# Patient Record
Sex: MALE | Race: WHITE | NOT HISPANIC OR LATINO | Employment: UNEMPLOYED | URBAN - METROPOLITAN AREA
[De-identification: names, ages, dates, MRNs, and addresses within clinical notes are randomized per-mention and may not be internally consistent; named-entity substitution may affect disease eponyms.]

---

## 2023-04-02 ENCOUNTER — OFFICE VISIT (OUTPATIENT)
Dept: URGENT CARE | Facility: CLINIC | Age: 38
End: 2023-04-02

## 2023-04-02 VITALS
BODY MASS INDEX: 33.64 KG/M2 | RESPIRATION RATE: 15 BRPM | SYSTOLIC BLOOD PRESSURE: 130 MMHG | HEART RATE: 80 BPM | WEIGHT: 235 LBS | OXYGEN SATURATION: 97 % | TEMPERATURE: 99.2 F | DIASTOLIC BLOOD PRESSURE: 78 MMHG | HEIGHT: 70 IN

## 2023-04-02 DIAGNOSIS — B96.89 BACTERIAL CONJUNCTIVITIS OF BOTH EYES: ICD-10-CM

## 2023-04-02 DIAGNOSIS — H10.9 BACTERIAL CONJUNCTIVITIS OF BOTH EYES: ICD-10-CM

## 2023-04-02 PROCEDURE — 99203 OFFICE O/P NEW LOW 30 MIN: CPT | Performed by: NURSE PRACTITIONER

## 2023-04-02 RX ORDER — ERYTHROMYCIN 5 MG/G
OINTMENT OPHTHALMIC
Qty: 3.5 G | Refills: 0 | Status: SHIPPED | OUTPATIENT
Start: 2023-04-02 | End: 2023-09-16

## 2023-04-02 RX ORDER — POLYMYXIN B SULFATE AND TRIMETHOPRIM 1; 10000 MG/ML; [USP'U]/ML
1 SOLUTION OPHTHALMIC EVERY 4 HOURS
Qty: 10 ML | Refills: 0 | Status: SHIPPED | OUTPATIENT
Start: 2023-04-02 | End: 2023-04-02

## 2023-04-02 ASSESSMENT — VISUAL ACUITY: OU: 1

## 2023-04-02 NOTE — LETTER
April 2, 2023    To Whom It May Concern:         This is confirmation that Peter Grace attended his scheduled appointment with THONG Romero on 4/02/23. Please excuse from work today 4/2/23. May return next scheduled shift after 4/2/23.       Sincerely,          VONDA Romero.  [phone]

## 2023-04-02 NOTE — PROGRESS NOTES
Patient has consented to treatment and for use of patient information for treatment and billing purposes.    Date: 04/02/23     Arrival Mode: Private Vehicle / Ambulatory    Chief Complaint:    Chief Complaint   Patient presents with   • Conjunctivitis        History of Present Illness: 38 y.o. male  presents bilateral eye irritation redness and drainage.  Patient states several days ago he did have symptoms consistent with a viral URI some congestion mild cough and sore throat.  Patient states at this point symptoms have resolved.  He states that he woke this morning with his left eye sealed shut with mucus.  He denies wearing contacts.  Denies any fever and or body aches.  Denies any vision changes.  Denies possibility of foreign body or any eye trauma.        ROS:  As stated in HPI     Pertinent Medical History:    Past Medical History:   Diagnosis Date   • Anxiety    • Bipolar affect, depressed ([facility])         Pertinent Surgical History:    History reviewed. No pertinent surgical history.     Current  Medications:  Current Outpatient Medications on File Prior to Visit   Medication Sig Dispense Refill   • clonazepam (KLONOPIN) 1 MG TABS Take 0.5 Tabs by mouth 2 times a day. (Patient not taking: Reported on 4/2/2023) 60 Tab 0     No current facility-administered medications on file prior to visit.        Allergies:     Patient has no known allergies.     Social History:   Social History     Socioeconomic History   • Marital status: Single     Spouse name: Not on file   • Number of children: Not on file   • Years of education: Not on file   • Highest education level: Not on file   Occupational History   • Not on file   Tobacco Use   • Smoking status: Every Day   • Smokeless tobacco: Never   Substance and Sexual Activity   • Alcohol use: Yes     Comment: occ   • Drug use: Not on file   • Sexual activity: Not on file   Other Topics Concern   • Not on file   Social History Narrative   • Not on file     Social  Determinants of Health     Financial Resource Strain: Not on file   Food Insecurity: Not on file   Transportation Needs: Not on file   Physical Activity: Not on file   Stress: Not on file   Social Connections: Not on file   Intimate Partner Violence: Not on file   Housing Stability: Not on file        No LMP for male patient.       Physical Exam:  Vitals:    04/02/23 1640   BP: 130/78   Pulse: (!) 125   Resp: 15   Temp: 37.3 °C (99.2 °F)   SpO2: 97%        Physical Exam  Constitutional:       General: He is awake.      Appearance: Normal appearance. He is not ill-appearing, toxic-appearing or diaphoretic.   HENT:      Head: Normocephalic and atraumatic.      Right Ear: Tympanic membrane, ear canal and external ear normal.      Left Ear: Tympanic membrane, ear canal and external ear normal.      Nose: Nose normal.      Mouth/Throat:      Lips: Pink.      Mouth: Mucous membranes are moist.      Tongue: No lesions.      Palate: No lesions.      Pharynx: Posterior oropharyngeal erythema present. No oropharyngeal exudate or uvula swelling.      Tonsils: No tonsillar exudate or tonsillar abscesses.   Eyes:      General: Lids are normal. Lids are everted, no foreign bodies appreciated. Vision grossly intact. Gaze aligned appropriately. No allergic shiner or scleral icterus.        Right eye: No foreign body or hordeolum.         Left eye: No foreign body or hordeolum.      Extraocular Movements: Extraocular movements intact.      Conjunctiva/sclera:      Right eye: Right conjunctiva is not injected. Exudate present. No chemosis or hemorrhage.     Left eye: Left conjunctiva is injected. Exudate present. No chemosis or hemorrhage.     Pupils: Pupils are equal, round, and reactive to light.      Comments: Bilateral conjunctival injection with purulent neon green mucus.  No pain with eye movement.   Cardiovascular:      Rate and Rhythm: Normal rate and regular rhythm.      Pulses:           Radial pulses are 2+ on the right  side and 2+ on the left side.      Heart sounds: Normal heart sounds.   Pulmonary:      Effort: Pulmonary effort is normal.      Breath sounds: Normal breath sounds and air entry. No decreased breath sounds, wheezing, rhonchi or rales.   Abdominal:      General: Abdomen is flat. Bowel sounds are normal.      Palpations: Abdomen is soft.      Tenderness: There is no abdominal tenderness.   Musculoskeletal:      Right lower leg: No edema.      Left lower leg: No edema.   Lymphadenopathy:      Cervical: No cervical adenopathy.   Skin:     General: Skin is warm.      Capillary Refill: Capillary refill takes less than 2 seconds.      Coloration: Skin is not cyanotic or pale.   Neurological:      Mental Status: He is alert and oriented to person, place, and time.      Gait: Gait is intact.   Psychiatric:         Behavior: Behavior normal. Behavior is cooperative.        Diagnostics:    None     Medical Decision Making:  I personally reviewed prior external notes and test results pertinent to today's visit.   Shared decision-making was utilized with patient did develop treatment plan and clinic course. Pt is clinically stable at today's acute urgent care visit.  No acute distress noted. Appropriate for outpatient care at this time. Pleasant, 38 y.o. male presenting clinic with exam findings that are consistent with bacterial conjunctivitis.  Will treat with erythromycin at this time as pharmacies do not have Polytrim it is backordered.  Discussed meticulous hand hygiene warm compresses.  Return to clinic in the next 2 to 3 days if symptoms or not improving.  Provide a work note as requested    Did advise patient on conservative measures for management of symptoms.  Patient is agreeable to pursue adequate rest, adequate hydration, saltwater gargle and Neti pot for any symptoms of upper respiratory congestion.  Over-the-counter analgesia and antipyretics on a p.r.n. basis as needed for pain and fever.  Did discuss  over-the-counter cough medications.      Patient will monitor symptoms closely for worsening and is advised to seek further evaluation the emergency room if alarm signs or symptoms arise.  Patient states understanding and verbalizes agreement with this plan of care.    Plan:    1. Bacterial conjunctivitis of both eyes    - erythromycin 5 MG/GM Ointment; Apply pea sized amount to affected eye(s) 4 times per day, continue 48 hours past resolution of symptoms.  Dispense: 3.5 g; Refill: 0            Disposition:  Patient was discharged in stable condition.    Voice Recognition Disclaimer: Portions of this document were created using voice recognition software. The software does have a chance of producing errors of grammar and possibly content. I have made every reasonable attempt to correct obvious errors, but there may be errors of grammar and possibly content that I did not discover before finalizing the documentation.    Elsy Murdock, VONDA.

## 2023-08-02 ENCOUNTER — HOSPITAL ENCOUNTER (EMERGENCY)
Facility: MEDICAL CENTER | Age: 38
End: 2023-08-02
Attending: EMERGENCY MEDICINE
Payer: MEDICAID

## 2023-08-02 VITALS
OXYGEN SATURATION: 98 % | WEIGHT: 215 LBS | BODY MASS INDEX: 30.1 KG/M2 | DIASTOLIC BLOOD PRESSURE: 60 MMHG | RESPIRATION RATE: 18 BRPM | SYSTOLIC BLOOD PRESSURE: 111 MMHG | TEMPERATURE: 98.8 F | HEART RATE: 97 BPM | HEIGHT: 71 IN

## 2023-08-02 DIAGNOSIS — F15.10 METHAMPHETAMINE ABUSE (HCC): ICD-10-CM

## 2023-08-02 DIAGNOSIS — F31.9 BIPOLAR AFFECTIVE DISORDER, REMISSION STATUS UNSPECIFIED (HCC): ICD-10-CM

## 2023-08-02 PROCEDURE — 99284 EMERGENCY DEPT VISIT MOD MDM: CPT

## 2023-08-02 PROCEDURE — 700111 HCHG RX REV CODE 636 W/ 250 OVERRIDE (IP): Mod: JZ | Performed by: EMERGENCY MEDICINE

## 2023-08-02 PROCEDURE — 96372 THER/PROPH/DIAG INJ SC/IM: CPT

## 2023-08-02 RX ORDER — QUETIAPINE FUMARATE 100 MG/1
100 TABLET, FILM COATED ORAL 2 TIMES DAILY
Qty: 60 TABLET | Refills: 3 | Status: SHIPPED | OUTPATIENT
Start: 2023-08-02 | End: 2023-09-16

## 2023-08-02 RX ORDER — HALOPERIDOL 5 MG/ML
5 INJECTION INTRAMUSCULAR ONCE
Status: COMPLETED | OUTPATIENT
Start: 2023-08-02 | End: 2023-08-02

## 2023-08-02 RX ADMIN — HALOPERIDOL LACTATE 5 MG: 5 INJECTION, SOLUTION INTRAMUSCULAR at 04:30

## 2023-08-02 ASSESSMENT — PAIN DESCRIPTION - PAIN TYPE: TYPE: ACUTE PAIN

## 2023-08-02 NOTE — ED NOTES
Pt discharged to home. Discharge paperwork provided. Education provided by ERP.  Patient ambulatory, alert and oriented, Security escorted Pt out of ER with all belongings and steady gait.
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
## 2023-09-15 ENCOUNTER — HOSPITAL ENCOUNTER (INPATIENT)
Facility: MEDICAL CENTER | Age: 38
LOS: 20 days | DRG: 917 | End: 2023-10-06
Attending: EMERGENCY MEDICINE | Admitting: INTERNAL MEDICINE
Payer: MEDICAID

## 2023-09-15 DIAGNOSIS — R45.851 SUICIDAL IDEATION: ICD-10-CM

## 2023-09-15 DIAGNOSIS — F29 PSYCHOSIS, UNSPECIFIED PSYCHOSIS TYPE (HCC): ICD-10-CM

## 2023-09-15 DIAGNOSIS — Z91.199 NON-COMPLIANCE: ICD-10-CM

## 2023-09-15 DIAGNOSIS — F19.10 POLYSUBSTANCE ABUSE (HCC): ICD-10-CM

## 2023-09-15 DIAGNOSIS — F31.63 BIPOLAR DISORDER, CURRENT EPISODE MIXED, SEVERE, WITHOUT PSYCHOTIC FEATURES (HCC): ICD-10-CM

## 2023-09-15 DIAGNOSIS — F15.10 METHAMPHETAMINE ABUSE (HCC): ICD-10-CM

## 2023-09-15 DIAGNOSIS — T50.902A INTENTIONAL OVERDOSE, INITIAL ENCOUNTER (HCC): Primary | ICD-10-CM

## 2023-09-15 PROCEDURE — 94760 N-INVAS EAR/PLS OXIMETRY 1: CPT

## 2023-09-15 PROCEDURE — 93005 ELECTROCARDIOGRAM TRACING: CPT | Performed by: EMERGENCY MEDICINE

## 2023-09-15 PROCEDURE — 36415 COLL VENOUS BLD VENIPUNCTURE: CPT

## 2023-09-15 PROCEDURE — 82962 GLUCOSE BLOOD TEST: CPT

## 2023-09-15 PROCEDURE — 99291 CRITICAL CARE FIRST HOUR: CPT

## 2023-09-16 PROBLEM — R45.1 AGITATION: Status: ACTIVE | Noted: 2023-09-16

## 2023-09-16 PROBLEM — D72.829 LEUKOCYTOSIS: Status: ACTIVE | Noted: 2023-09-16

## 2023-09-16 PROBLEM — R74.8 ELEVATED CPK: Status: ACTIVE | Noted: 2023-09-16

## 2023-09-16 PROBLEM — T50.901A MEDICATION OVERDOSE: Status: ACTIVE | Noted: 2023-09-16

## 2023-09-16 PROBLEM — F15.10 AMPHETAMINE ABUSE (HCC): Chronic | Status: ACTIVE | Noted: 2023-09-16

## 2023-09-16 PROBLEM — T50.902A INTENTIONAL OVERDOSE (HCC): Status: ACTIVE | Noted: 2023-09-16

## 2023-09-16 LAB
ALBUMIN SERPL BCP-MCNC: 4.1 G/DL (ref 3.2–4.9)
ALBUMIN/GLOB SERPL: 1.8 G/DL
ALP SERPL-CCNC: 80 U/L (ref 30–99)
ALT SERPL-CCNC: 34 U/L (ref 2–50)
AMPHET UR QL SCN: POSITIVE
ANION GAP SERPL CALC-SCNC: 15 MMOL/L (ref 7–16)
APAP SERPL-MCNC: <5 UG/ML (ref 10–30)
AST SERPL-CCNC: 71 U/L (ref 12–45)
BARBITURATES UR QL SCN: NEGATIVE
BASOPHILS # BLD AUTO: 0.3 % (ref 0–1.8)
BASOPHILS # BLD: 0.04 K/UL (ref 0–0.12)
BENZODIAZ UR QL SCN: POSITIVE
BILIRUB SERPL-MCNC: 0.8 MG/DL (ref 0.1–1.5)
BUN SERPL-MCNC: 24 MG/DL (ref 8–22)
BZE UR QL SCN: NEGATIVE
CALCIUM ALBUM COR SERPL-MCNC: 9 MG/DL (ref 8.5–10.5)
CALCIUM SERPL-MCNC: 9.1 MG/DL (ref 8.4–10.2)
CANNABINOIDS UR QL SCN: POSITIVE
CHLORIDE SERPL-SCNC: 103 MMOL/L (ref 96–112)
CK SERPL-CCNC: 1729 U/L (ref 0–154)
CK SERPL-CCNC: 916 U/L (ref 0–154)
CO2 SERPL-SCNC: 20 MMOL/L (ref 20–33)
CREAT SERPL-MCNC: 1.25 MG/DL (ref 0.5–1.4)
EKG IMPRESSION: NORMAL
EKG IMPRESSION: NORMAL
EOSINOPHIL # BLD AUTO: 0.26 K/UL (ref 0–0.51)
EOSINOPHIL NFR BLD: 2.1 % (ref 0–6.9)
ERYTHROCYTE [DISTWIDTH] IN BLOOD BY AUTOMATED COUNT: 45.1 FL (ref 35.9–50)
ETHANOL BLD-MCNC: <10.1 MG/DL
FENTANYL UR QL: NEGATIVE
GFR SERPLBLD CREATININE-BSD FMLA CKD-EPI: 76 ML/MIN/1.73 M 2
GLOBULIN SER CALC-MCNC: 2.3 G/DL (ref 1.9–3.5)
GLUCOSE BLD STRIP.AUTO-MCNC: 107 MG/DL (ref 65–99)
GLUCOSE SERPL-MCNC: 97 MG/DL (ref 65–99)
HCT VFR BLD AUTO: 41.3 % (ref 42–52)
HGB BLD-MCNC: 13.9 G/DL (ref 14–18)
IMM GRANULOCYTES # BLD AUTO: 0.1 K/UL (ref 0–0.11)
IMM GRANULOCYTES NFR BLD AUTO: 0.8 % (ref 0–0.9)
LYMPHOCYTES # BLD AUTO: 1.73 K/UL (ref 1–4.8)
LYMPHOCYTES NFR BLD: 13.7 % (ref 22–41)
MCH RBC QN AUTO: 32.9 PG (ref 27–33)
MCHC RBC AUTO-ENTMCNC: 33.7 G/DL (ref 32.3–36.5)
MCV RBC AUTO: 97.6 FL (ref 81.4–97.8)
METHADONE UR QL SCN: NEGATIVE
MONOCYTES # BLD AUTO: 0.62 K/UL (ref 0–0.85)
MONOCYTES NFR BLD AUTO: 4.9 % (ref 0–13.4)
NEUTROPHILS # BLD AUTO: 9.91 K/UL (ref 1.82–7.42)
NEUTROPHILS NFR BLD: 78.2 % (ref 44–72)
NRBC # BLD AUTO: 0 K/UL
NRBC BLD-RTO: 0 /100 WBC (ref 0–0.2)
OPIATES UR QL SCN: NEGATIVE
OXYCODONE UR QL SCN: NEGATIVE
PCP UR QL SCN: NEGATIVE
PLATELET # BLD AUTO: 296 K/UL (ref 164–446)
PMV BLD AUTO: 8.9 FL (ref 9–12.9)
POTASSIUM SERPL-SCNC: 3.7 MMOL/L (ref 3.6–5.5)
PROPOXYPH UR QL SCN: NEGATIVE
PROT SERPL-MCNC: 6.4 G/DL (ref 6–8.2)
RBC # BLD AUTO: 4.23 M/UL (ref 4.7–6.1)
SALICYLATES SERPL-MCNC: <1 MG/DL (ref 15–25)
SODIUM SERPL-SCNC: 138 MMOL/L (ref 135–145)
WBC # BLD AUTO: 12.7 K/UL (ref 4.8–10.8)

## 2023-09-16 PROCEDURE — 700111 HCHG RX REV CODE 636 W/ 250 OVERRIDE (IP): Performed by: INTERNAL MEDICINE

## 2023-09-16 PROCEDURE — 303105 HCHG CATHETER EXTRA

## 2023-09-16 PROCEDURE — 96376 TX/PRO/DX INJ SAME DRUG ADON: CPT

## 2023-09-16 PROCEDURE — 82077 ASSAY SPEC XCP UR&BREATH IA: CPT

## 2023-09-16 PROCEDURE — 99292 CRITICAL CARE ADDL 30 MIN: CPT | Performed by: INTERNAL MEDICINE

## 2023-09-16 PROCEDURE — 51702 INSERT TEMP BLADDER CATH: CPT

## 2023-09-16 PROCEDURE — 96375 TX/PRO/DX INJ NEW DRUG ADDON: CPT

## 2023-09-16 PROCEDURE — 36415 COLL VENOUS BLD VENIPUNCTURE: CPT

## 2023-09-16 PROCEDURE — 700111 HCHG RX REV CODE 636 W/ 250 OVERRIDE (IP): Mod: JZ | Performed by: INTERNAL MEDICINE

## 2023-09-16 PROCEDURE — 99291 CRITICAL CARE FIRST HOUR: CPT | Performed by: INTERNAL MEDICINE

## 2023-09-16 PROCEDURE — 700105 HCHG RX REV CODE 258: Performed by: INTERNAL MEDICINE

## 2023-09-16 PROCEDURE — 82550 ASSAY OF CK (CPK): CPT | Mod: 91

## 2023-09-16 PROCEDURE — 96366 THER/PROPH/DIAG IV INF ADDON: CPT

## 2023-09-16 PROCEDURE — 80179 DRUG ASSAY SALICYLATE: CPT

## 2023-09-16 PROCEDURE — 96365 THER/PROPH/DIAG IV INF INIT: CPT

## 2023-09-16 PROCEDURE — 700101 HCHG RX REV CODE 250: Performed by: EMERGENCY MEDICINE

## 2023-09-16 PROCEDURE — 80053 COMPREHEN METABOLIC PANEL: CPT

## 2023-09-16 PROCEDURE — 770022 HCHG ROOM/CARE - ICU (200)

## 2023-09-16 PROCEDURE — 93005 ELECTROCARDIOGRAM TRACING: CPT | Performed by: INTERNAL MEDICINE

## 2023-09-16 PROCEDURE — 93005 ELECTROCARDIOGRAM TRACING: CPT | Performed by: EMERGENCY MEDICINE

## 2023-09-16 PROCEDURE — 700105 HCHG RX REV CODE 258: Performed by: EMERGENCY MEDICINE

## 2023-09-16 PROCEDURE — 80143 DRUG ASSAY ACETAMINOPHEN: CPT

## 2023-09-16 PROCEDURE — 700101 HCHG RX REV CODE 250: Performed by: INTERNAL MEDICINE

## 2023-09-16 PROCEDURE — 94799 UNLISTED PULMONARY SVC/PX: CPT

## 2023-09-16 PROCEDURE — 80307 DRUG TEST PRSMV CHEM ANLYZR: CPT

## 2023-09-16 PROCEDURE — 700111 HCHG RX REV CODE 636 W/ 250 OVERRIDE (IP): Performed by: EMERGENCY MEDICINE

## 2023-09-16 PROCEDURE — 94760 N-INVAS EAR/PLS OXIMETRY 1: CPT

## 2023-09-16 PROCEDURE — 85025 COMPLETE CBC W/AUTO DIFF WBC: CPT

## 2023-09-16 RX ORDER — LORAZEPAM 2 MG/ML
2 INJECTION INTRAMUSCULAR ONCE
Status: COMPLETED | OUTPATIENT
Start: 2023-09-16 | End: 2023-09-16

## 2023-09-16 RX ORDER — LORAZEPAM 2 MG/ML
1 INJECTION INTRAMUSCULAR ONCE
Status: COMPLETED | OUTPATIENT
Start: 2023-09-16 | End: 2023-09-16

## 2023-09-16 RX ORDER — SODIUM CHLORIDE 9 MG/ML
INJECTION, SOLUTION INTRAVENOUS CONTINUOUS
Status: DISCONTINUED | OUTPATIENT
Start: 2023-09-16 | End: 2023-09-16

## 2023-09-16 RX ORDER — DEXMEDETOMIDINE HYDROCHLORIDE 4 UG/ML
.1-1.5 INJECTION, SOLUTION INTRAVENOUS CONTINUOUS
Status: DISCONTINUED | OUTPATIENT
Start: 2023-09-16 | End: 2023-09-19

## 2023-09-16 RX ORDER — LABETALOL HYDROCHLORIDE 5 MG/ML
10 INJECTION, SOLUTION INTRAVENOUS EVERY 4 HOURS PRN
Status: DISCONTINUED | OUTPATIENT
Start: 2023-09-16 | End: 2023-10-06 | Stop reason: HOSPADM

## 2023-09-16 RX ORDER — SODIUM CHLORIDE 9 MG/ML
500 INJECTION, SOLUTION INTRAVENOUS
Status: CANCELLED | OUTPATIENT
Start: 2023-09-16 | End: 2023-09-16

## 2023-09-16 RX ORDER — SODIUM CHLORIDE 9 MG/ML
1000 INJECTION, SOLUTION INTRAVENOUS ONCE
Status: COMPLETED | OUTPATIENT
Start: 2023-09-16 | End: 2023-09-16

## 2023-09-16 RX ORDER — ONDANSETRON 2 MG/ML
4 INJECTION INTRAMUSCULAR; INTRAVENOUS EVERY 4 HOURS PRN
Status: DISCONTINUED | OUTPATIENT
Start: 2023-09-16 | End: 2023-10-06 | Stop reason: HOSPADM

## 2023-09-16 RX ORDER — DIPHENHYDRAMINE HYDROCHLORIDE 50 MG/ML
50 INJECTION INTRAMUSCULAR; INTRAVENOUS EVERY 6 HOURS PRN
Status: DISCONTINUED | OUTPATIENT
Start: 2023-09-16 | End: 2023-09-29

## 2023-09-16 RX ORDER — SODIUM CHLORIDE, SODIUM LACTATE, POTASSIUM CHLORIDE, CALCIUM CHLORIDE 600; 310; 30; 20 MG/100ML; MG/100ML; MG/100ML; MG/100ML
INJECTION, SOLUTION INTRAVENOUS CONTINUOUS
Status: DISCONTINUED | OUTPATIENT
Start: 2023-09-16 | End: 2023-09-19

## 2023-09-16 RX ORDER — PROCHLORPERAZINE EDISYLATE 5 MG/ML
5-10 INJECTION INTRAMUSCULAR; INTRAVENOUS EVERY 4 HOURS PRN
Status: DISCONTINUED | OUTPATIENT
Start: 2023-09-16 | End: 2023-10-06 | Stop reason: HOSPADM

## 2023-09-16 RX ORDER — ENOXAPARIN SODIUM 100 MG/ML
40 INJECTION SUBCUTANEOUS DAILY
Status: DISCONTINUED | OUTPATIENT
Start: 2023-09-16 | End: 2023-09-30

## 2023-09-16 RX ORDER — LORAZEPAM 2 MG/ML
2 INJECTION INTRAMUSCULAR EVERY 6 HOURS PRN
Status: DISCONTINUED | OUTPATIENT
Start: 2023-09-16 | End: 2023-09-20

## 2023-09-16 RX ORDER — PROMETHAZINE HYDROCHLORIDE 25 MG/1
12.5-25 TABLET ORAL EVERY 4 HOURS PRN
Status: DISCONTINUED | OUTPATIENT
Start: 2023-09-16 | End: 2023-09-29

## 2023-09-16 RX ORDER — ONDANSETRON 4 MG/1
4 TABLET, ORALLY DISINTEGRATING ORAL EVERY 4 HOURS PRN
Status: DISCONTINUED | OUTPATIENT
Start: 2023-09-16 | End: 2023-10-06 | Stop reason: HOSPADM

## 2023-09-16 RX ORDER — PROMETHAZINE HYDROCHLORIDE 25 MG/1
12.5-25 SUPPOSITORY RECTAL EVERY 4 HOURS PRN
Status: DISCONTINUED | OUTPATIENT
Start: 2023-09-16 | End: 2023-09-29

## 2023-09-16 RX ADMIN — SODIUM CHLORIDE 1000 ML: 9 INJECTION, SOLUTION INTRAVENOUS at 00:30

## 2023-09-16 RX ADMIN — DEXMEDETOMIDINE HYDROCHLORIDE 1.5 MCG/KG/HR: 400 INJECTION INTRAVENOUS at 18:19

## 2023-09-16 RX ADMIN — SODIUM CHLORIDE, POTASSIUM CHLORIDE, SODIUM LACTATE AND CALCIUM CHLORIDE: 600; 310; 30; 20 INJECTION, SOLUTION INTRAVENOUS at 21:25

## 2023-09-16 RX ADMIN — KETAMINE HYDROCHLORIDE 100 MG: 50 INJECTION INTRAMUSCULAR; INTRAVENOUS at 05:25

## 2023-09-16 RX ADMIN — DEXMEDETOMIDINE HYDROCHLORIDE 1.5 MCG/KG/HR: 400 INJECTION INTRAVENOUS at 15:31

## 2023-09-16 RX ADMIN — SODIUM CHLORIDE 1000 ML: 9 INJECTION, SOLUTION INTRAVENOUS at 01:15

## 2023-09-16 RX ADMIN — DEXMEDETOMIDINE HYDROCHLORIDE 1.5 MCG/KG/HR: 400 INJECTION INTRAVENOUS at 22:02

## 2023-09-16 RX ADMIN — LORAZEPAM 1 MG: 2 INJECTION INTRAMUSCULAR; INTRAVENOUS at 01:30

## 2023-09-16 RX ADMIN — SODIUM CHLORIDE: 9 INJECTION, SOLUTION INTRAVENOUS at 05:24

## 2023-09-16 RX ADMIN — LORAZEPAM 2 MG: 2 INJECTION INTRAMUSCULAR; INTRAVENOUS at 16:57

## 2023-09-16 RX ADMIN — DEXMEDETOMIDINE HYDROCHLORIDE 1.5 MCG/KG/HR: 400 INJECTION INTRAVENOUS at 11:50

## 2023-09-16 RX ADMIN — LORAZEPAM 2 MG: 2 INJECTION INTRAMUSCULAR; INTRAVENOUS at 00:11

## 2023-09-16 RX ADMIN — DIPHENHYDRAMINE HYDROCHLORIDE 50 MG: 50 INJECTION, SOLUTION INTRAMUSCULAR; INTRAVENOUS at 16:57

## 2023-09-16 RX ADMIN — SODIUM CHLORIDE 1000 ML: 9 INJECTION, SOLUTION INTRAVENOUS at 02:15

## 2023-09-16 RX ADMIN — ENOXAPARIN SODIUM 40 MG: 100 INJECTION SUBCUTANEOUS at 17:25

## 2023-09-16 RX ADMIN — SODIUM CHLORIDE, POTASSIUM CHLORIDE, SODIUM LACTATE AND CALCIUM CHLORIDE: 600; 310; 30; 20 INJECTION, SOLUTION INTRAVENOUS at 17:19

## 2023-09-16 RX ADMIN — DEXMEDETOMIDINE HYDROCHLORIDE 0.2 MCG/KG/HR: 400 INJECTION INTRAVENOUS at 05:22

## 2023-09-16 RX ADMIN — DIPHENHYDRAMINE HYDROCHLORIDE 50 MG: 50 INJECTION, SOLUTION INTRAMUSCULAR; INTRAVENOUS at 09:26

## 2023-09-16 RX ADMIN — SODIUM CHLORIDE, POTASSIUM CHLORIDE, SODIUM LACTATE AND CALCIUM CHLORIDE: 600; 310; 30; 20 INJECTION, SOLUTION INTRAVENOUS at 09:39

## 2023-09-16 RX ADMIN — LORAZEPAM 1 MG: 2 INJECTION INTRAMUSCULAR; INTRAVENOUS at 04:15

## 2023-09-16 ASSESSMENT — FIBROSIS 4 INDEX: FIB4 SCORE: 1.48

## 2023-09-16 ASSESSMENT — PAIN DESCRIPTION - PAIN TYPE
TYPE: ACUTE PAIN
TYPE: ACUTE PAIN

## 2023-09-16 NOTE — ED NOTES
7017 report received from Shanice  [id number] report called to Laurent. No further questions.  Pt transferred to floor, security present.  Pt into ICU bed, infusions ongoing.  All belonging taken to pt's room

## 2023-09-16 NOTE — ED TRIAGE NOTES
"Chief Complaint   Patient presents with    ALOC           Drug Overdose    Legal 2000     Patient BIB EMS with legal hold initiated by [employer]. Patient reports taking 15-20 pills of Seraquil. Patient combative in triage, arrived in four point restraints by EMS.      BP 98/55   Pulse (!) 112   Temp 36.7 °C (98.1 °F) (Temporal)   Resp (!) 53   Ht 1.803 m (5' 11\")   Wt 97.5 kg (214 lb 15.2 oz)   SpO2 97%   BMI 29.98 kg/m²     Patient incoherent, unable to answer triage questions. Per EMS patient reports taking 15-20 pills of Seroquel for SI attempt, states also tried to cut self with metal jaden. Patient placed on legal hold by [facility] PTA. Legal hold initiated at 2325  "

## 2023-09-16 NOTE — ED NOTES
2345- Patient BIB EMS on legal 200 for SI. Staff sitter initiated fr legal hold.     0045- Patient continuously fighting and loosening restraints, medicated and provided IVF on pressure bag.     0115- Patient placed in hard restraints, intermittently fighting restraints. Patient remains on monitors. In direct line of sight of sitter.     0215- Patient remains in direct line of sight of sitter, patient fighting restraints intermittently. Patient unable to participate in conversations at this time.     0315- Patient alternating between sleeping with audible snoring, and fighting restraints mumbling incoherently. Patient unable to participate in conversations at this time. Patient in direct line of sight of sitter.

## 2023-09-16 NOTE — ED NOTES
"Med rec complete per Romie. Pt unable to be interviewed.   Per Romie , Pt never picked up Seroquel from his 8/8/23 ER visit and it was returned to stock. Per Romie, Pt last picked up Doxycycline x 7 day course 6/23/23.  Allergies not verified ,left historical.  Per notel Pt had \"overdose of Seroquel \". Pt historically had a 50 mg dose as well as a 100 mg dose that was not filled. Unsure of strength of medication he took or amount.    "

## 2023-09-16 NOTE — ED NOTES
Patient resting on gurney, intermittently fighting restraints.     0630- Repositioned with 2 security and 2 RN at bedside with ERP. Patient fighting restraints and staff with readjustment.     Perez emptied, output= 1100

## 2023-09-16 NOTE — ED PROVIDER NOTES
ED Provider Note    Scribed for Geovanni Briseno by Geovanni Briseno. 9/15/2023  11:49 PM    Primary care provider: Pcp Pt States None  Means of arrival: EMS  History obtained from: Patient  History limited by: None    CHIEF COMPLAINT  Chief Complaint   Patient presents with    ALOC           Drug Overdose    Legal 2000     Patient BIB EMS with legal hold initiated by [employer]. Patient reports taking 15-20 pills of Seraquil. Patient combative in triage, arrived in four point restraints by EMS.      EXTERNAL RECORDS REVIEWED  External ED Note patient was seen at [facility] on the eighth of last month for encephalopathy, substance abuse    HPI/ROS  LIMITATION TO HISTORY   Select: Altered mental status / Confusion  OUTSIDE HISTORIAN(S):  EMS provided all collateral formation regarding patient's presentation    HPI  Peter Grace is a 38 y.o. male who presents to the Emergency Department by EMS for possible Seroquel overdose.  History is somewhat limited due to patient's altered mental status.  He reportedly was seen at [facility], was discharged there by security and put on trespassing violation.  Reportedly when he was in the ambulance bay or somewhere near the hospital, right outside the door he reportedly overdosed on Seroquel, unclear how much.  But he had a prescription bottle with him that was filled 3 days ago that was empty per report.  Police were called as the patient was reported as trespassing and when he was in the police car patient started screaming that he was suicidal and that he overdosed on Seroquel and EMS was called and the patient requested to go to a different facility other than [facility].  Unclear whether he been drinking alcohol or not.  Or use any illicit substances although he does have a history of this.  In route patient was aggressive requiring restraints and was given 5 mg IM Versed.  Upon arrival here patient is providing very little to no  history and is altered but still aggressive requiring restraints.  He does state that he wanted to kill himself he overdosed on Seroquel, otherwise no other history is provided.    The only empty bottle of medication found in the patient was his Seroquel prescription.  He supposed take 50 mg every night.  It was filled on the 11th.  So even if he took a dose on the 11th, 12, 13 and 14th the most he could have taken was around 1,250 mg.     REVIEW OF SYSTEMS  As above, all other systems reviewed and are negative.   See HPI for further details.     PAST MEDICAL HISTORY   has a past medical history of Anxiety and Bipolar affect, depressed (HCC).  SURGICAL HISTORY  patient denies any surgical history  SOCIAL HISTORY  Social History     Tobacco Use    Smoking status: Every Day    Smokeless tobacco: Never   Substance Use Topics    Alcohol use: Yes     Comment: occ      Social History     Substance and Sexual Activity   Drug Use Not on file     FAMILY HISTORY  No family history on file.  CURRENT MEDICATIONS  Home Medications    **Home medications have not yet been reviewed for this encounter**       ALLERGIES  No Known Allergies    PHYSICAL EXAM    VITAL SIGNS:   Vitals:    09/16/23 0512 09/16/23 0515 09/16/23 0517 09/16/23 0522   BP: 95/55 118/62 116/67    Pulse: 92 (!) 101 96 92   Resp: 12 (!) 21 17 14   Temp:       TempSrc:       SpO2: 98% 97% 97% 94%   Weight:       Height:         Vitals: My interpretation: normotensive, tachycardic, afebrile, not hypoxic    Reinterpretation of vitals: Improving    Cardiac Monitor Interpretation: The cardiac monitor revealed normal Sinus Rhythm with tachycardia as interpreted by me. The cardiac monitor was ordered secondary to the patient's history of tachycardia and to monitor for dysrhythmia and/or tachycardia.    PE:   Gen: Patient is delirious, signs symptoms of psychosis, restrained and agitated and aggressive   ENT: Mucous membranes moist, posterior pharynx clear, uvula midline,  nares patent bilaterally, pupils 2mm  Neck: Supple, FROM  Pulmonary: Lungs are clear to auscultation bilaterally. No tachypnea  CV:  RRR, no murmur appreciated, pulses 2+ in both upper and lower extremities  Abdomen: soft, NT/ND; no rebound/guarding  : no CVA or suprapubic tenderness   Neuro: A&Ox1 person only, speech slurred/mumbling, moving al 4 extremities and no focal deficits appreciated  Skin: No rash or lesions.  No pallor or jaundice.  No cyanosis.  Warm and dry.   Psych: Patient states he was trying to kill himself tonight by overdosing on Seroquel, has psychotic behavior, is aggressive, flailing, potential danger to himself and others, not redirectable    DIAGNOSTIC STUDIES / PROCEDURES    LABS  Results for orders placed or performed during the hospital encounter of 09/15/23   CBC WITH DIFFERENTIAL   Result Value Ref Range    WBC 12.7 (H) 4.8 - 10.8 K/uL    RBC 4.23 (L) 4.70 - 6.10 M/uL    Hemoglobin 13.9 (L) 14.0 - 18.0 g/dL    Hematocrit 41.3 (L) 42.0 - 52.0 %    MCV 97.6 81.4 - 97.8 fL    MCH 32.9 27.0 - 33.0 pg    MCHC 33.7 32.3 - 36.5 g/dL    RDW 45.1 35.9 - 50.0 fL    Platelet Count 296 164 - 446 K/uL    MPV 8.9 (L) 9.0 - 12.9 fL    Neutrophils-Polys 78.20 (H) 44.00 - 72.00 %    Lymphocytes 13.70 (L) 22.00 - 41.00 %    Monocytes 4.90 0.00 - 13.40 %    Eosinophils 2.10 0.00 - 6.90 %    Basophils 0.30 0.00 - 1.80 %    Immature Granulocytes 0.80 0.00 - 0.90 %    Nucleated RBC 0.00 0.00 - 0.20 /100 WBC    Neutrophils (Absolute) 9.91 (H) 1.82 - 7.42 K/uL    Lymphs (Absolute) 1.73 1.00 - 4.80 K/uL    Monos (Absolute) 0.62 0.00 - 0.85 K/uL    Eos (Absolute) 0.26 0.00 - 0.51 K/uL    Baso (Absolute) 0.04 0.00 - 0.12 K/uL    Immature Granulocytes (abs) 0.10 0.00 - 0.11 K/uL    NRBC (Absolute) 0.00 K/uL   COMP METABOLIC PANEL   Result Value Ref Range    Sodium 138 135 - 145 mmol/L    Potassium 3.7 3.6 - 5.5 mmol/L    Chloride 103 96 - 112 mmol/L    Co2 20 20 - 33 mmol/L    Anion Gap 15.0 7.0 - 16.0    Glucose  97 65 - 99 mg/dL    Bun 24 (H) 8 - 22 mg/dL    Creatinine 1.25 0.50 - 1.40 mg/dL    Calcium 9.1 8.4 - 10.2 mg/dL    Correct Calcium 9.0 8.5 - 10.5 mg/dL    AST(SGOT) 71 (H) 12 - 45 U/L    ALT(SGPT) 34 2 - 50 U/L    Alkaline Phosphatase 80 30 - 99 U/L    Total Bilirubin 0.8 0.1 - 1.5 mg/dL    Albumin 4.1 3.2 - 4.9 g/dL    Total Protein 6.4 6.0 - 8.2 g/dL    Globulin 2.3 1.9 - 3.5 g/dL    A-G Ratio 1.8 g/dL   Salicylate   Result Value Ref Range    Salicylates, Quant. <1.0 (L) 15.0 - 25.0 mg/dL   ACETAMINOPHEN   Result Value Ref Range    Acetaminophen -Tylenol <5.0 (L) 10.0 - 30.0 ug/mL   CREATINE KINASE   Result Value Ref Range    CPK Total 1729 (HH) 0 - 154 U/L   ETHYL ALCOHOL (BLOOD)   Result Value Ref Range    Diagnostic Alcohol <10.1 <10.1 mg/dL   ESTIMATED GFR   Result Value Ref Range    GFR (CKD-EPI) 76 >60 mL/min/1.73 m 2   EKG (NOW)   Result Value Ref Range    Report       [facility] Emergency Dept.    Test Date:  2023-09-15  Pt Name:    MARTY CHRISTIANSON             Department: [facility]  MRN:        [id number]                      Room:       [facility] 6  Gender:     Male                         Technician: [id number]  :        1985                   Requested By:FELICITY RUIZ  Order #:    [id number]                    Reading MD: Felicity Ruiz    Measurements  Intervals                                Axis  Rate:       104                          P:          55  RI:         138                          QRS:        57  QRSD:       87                           T:          41  QT:         338  QTc:        445    Interpretive Statements  Sinus tachycardia  Borderline low voltage, extremity leads  Baseline wander in lead(s) I,II,aVR,V1,V2,V3,V6  No previous ECG available for comparison  Electronically Signed On 2023 00:00:19 PDT by Felicity Ruiz     EKG (NOW)   Result Value Ref Range    Report       [facility] Emergency Dept.    Test Date:   2023  Pt Name:    MARTY CHRISTIANSON             Department: [facility]  MRN:        [id number]                      [facility]  Gender:     Male                         Technician: [id number]  :        1985                   Requested By:FELICITY RUIZ  Order #:    [id number]                    Reading MD: Felicity Ruiz    Measurements  Intervals                                Axis  Rate:       94                           P:          66  NY:         141                          QRS:        56  QRSD:       79                           T:          66  QT:         361  QTc:        452    Interpretive Statements  Sinus rhythm  Probable left atrial enlargement  Baseline wander in lead(s) V2  Compared to ECG 09/15/2023 23:52:48  Sinus tachycardia no longer present  Electronically Signed On 2023 04:35:24 PDT by Felicity Ruiz     POCT glucose device results   Result Value Ref Range    POC Glucose, Blood 107 (H) 65 - 99 mg/dL      All labs reviewed by me. Labs were compared to prior labs if they were available. Significant for mild leukocytosis, no significant anemia, normal electrolytes, normal glucose, normal renal function, normal liver enzymes other than very mild AST transaminitis, alcohol negative, CPK of 1729, salicylate and Tylenol levels are negative.  Urine drug screen shows pending at time of admission.    COURSE & MEDICAL DECISION MAKING  Nursing notes, VS, PMSFHx, labs, imaging, EKG reviewed in chart.    ED Observation Status? Yes; I am placing the patient in to an observation status due to a diagnostic uncertainty as well as therapeutic intensity. Patient placed in observation status at 11:59 PM, 9/15/2023.     Observation plan is as follows: Patient require frequent reevaluation, prolonged stay in the emergency department, and evaluation with labs before final disposition can be made for inpatient versus outpatient follow-up.    Upon Reevaluation, the patient's condition has: not  improved; and will be escalated to hospitalization.    Patient discharged from ED Observation status at 4:29 AM (Time) 9/16/2023 (Date).     Ddx: Acute psychosis, suicidal ideation, homicidal ideation, homelessness, polysubstance abuse, methamphetamine abuse    MDM: 11:49 PM Peter Grace is a 38 y.o. male who presented with acute on chronic severe psychosis, polysubstance abuse, noncompliance and altered mental status after reported intentional overdose on Seroquel with suicidal ideation.  Patient was just discharged for aggressive behavior and psychosis from [facility] and was standing outside of the hospital and reportedly overdosed on Seroquel.  Unclear how much or if any he took but he did have an empty bottle that was filled 3 days prior.  Police were called as patient was now trespassing and in the police car he started screaming that he was suicidal and that he overdosed on his Seroquel.  EMS was called patient was transported here unclear why he was not just taken back to [facility] as it was the closest facility.  In route EMS was required to put him in restraints due to concerns about patient harming himself or others and he was given 5 mg IM Versed to try to help with sedation.  Upon arrival here patient is able to provide very little information other than repeating that he is suicidal try to overdose tonight.  He is obviously altered.  EMS provides all collateral formation of substance tonight.  Upon arrival here patient transported from EMS stretcher to [facility] in safe manner and was placed in restraints as he is combative and aggressive.  Padded all railings to help protect him from harming himself.  His vital signs show that he is afebrile but tachycardic, thankfully normal tensive.  Exam shows that he is acutely altered, acting psychotic, pupils are 2 mm and reactive.  Unclear what other medications or drugs he may have taken today although it is reported that he has  a significant history of polysubstance abuse.  Multiple ED visits previously for methamphetamine related issues.  Unfortunately security to be called as patient is still thrashing in the bed requiring four-point restraints, concern for patient's safety and he was given 2 mg IV Ativan to help with sedation.  Ordered a fingerstick glucose, EKG, patient placed on the monitor and blood work sent, urine drug screen, alcohol level, salicylate and Tylenol levels, and basic blood work.  Poison control was contacted and recommend repeat EKGs as needed, continue benzodiazepines to help control agitation, and continued monitoring for at least 6 hours in the emergency department, bicarb if patient has EKG changes.  Thankfully EKG is unremarkable and shows no significant QTc or QT prolongation or other arrhythmic or ischemic changes currently.  All labs reviewed by me. Labs were compared to prior labs if they were available. Significant for mild leukocytosis, no significant anemia, normal electrolytes, normal glucose, normal renal function, normal liver enzymes other than very mild AST transaminitis, alcohol negative, CPK of 1729, salicylate Tylenol negative, urine drug screen shows pending.   Patient had hypotensive episodes in response to the multiple doses of Ativan but returned to normal tension easily with IV fluids.  However after 4 rounds of sedation here and he is still having uncontrolled agitation, I ultimately think he would benefit from a Precedex drip and ICU care this is demanding a significant amount of resources.  I discussed with the hospitalist who is willing to admit him for further evaluation and treatment.    I did a repeat EKG per poison control recommendations and QTc and EKG was unremarkable from prior.    Unfortunately despite multiple boluses of IV fluids patient still not making urine.  I did a bedside ultrasound which demonstrated likely 700 cc of retained urine retention.  Nursing bladder scan shows  greater than 760 cc of retained urine.  Likely secondary to anticholinergic effects of the overdose of Seroquel.  We will proceed with placing Perez catheter.  Patient is too agitated however and required procedural sedation with respiratory therapist at bedside using ketamine, see procedure note.    CONSCIOUS SEDATION PROCEDURE NOTE:  Patient identification was confirmed and patient consent was obtain unable to be obtained due to the emergence of the situation.  The procedure was conducted at 5:20 AM and performed by Dr. Briseno.  Anesthetic used: Ketamine 100mg  Length of Time: 18  Other medications administered: None  Pre-procedure neuro examination revealed no deficits. Patient's airway remained patent, O2SAT adequate through procedure. Vitals remained stable. Patient tolerated procedure well without complications. Post-procedure exam showed patient w/o cranial deficits. Sensory and motor intact. Patient returned to baseline prior to disposition.  Instructions for care discussed verbally and pt provided with additional written instructions for homecare and f/u.    CRITICAL CARE TIME 79 minutes: Patient tachycardic with persistent agitation, tangential nonlinear thought process, worsening acute psychosis and considered a danger to himself and others.  Concern for metabolic acidosis and electrolyte derangements requiring multiple rounds of sedation including EMS giving 5 mg IM Versed, patient given 2 mg IV Ativan here followed by another milligram IV Ativan, and yet another milligram of Ativan later in the morning with a total of 4 rounds of sedative medications.  Required frequent reevaluations.  There was a very real possibility of deterioration of the patient's condition.  This patient required the highest level of care.  I provided critical care services which included: review of the medical record, treatment orders, ordering and reviewing test results, frequent reevaluation of the patient's condition and  response to treatment, as well as discussing the case with appropriate personnel and various consultants. The critical care time associated with the care of this patient is exclusive of any procedures or specific interventions.    ADDITIONAL PROBLEM LIST AND DISPOSITION    I have discussed management of the patient with the following physicians and STEVEN's: Poison control, hospitalist    Discussion of management with other [facility] or appropriate source(s): Poison control     FINAL IMPRESSION  1. Intentional overdose, initial encounter (HCC) Acute   2. Psychosis, unspecified psychosis type (HCC) Acute   3. Polysubstance abuse (HCC) Acute   4. Suicidal ideation Acute   5. Non-compliance Acute   6. Methamphetamine abuse (HCC) Acute      The note accurately reflects work and decisions made by me.  Geovanni Briseno  9/15/2023  11:49 PM

## 2023-09-16 NOTE — ED NOTES
RN, staff and security to bedside to readjust patient positioning. Patient became combative with staff, attempting to spit at RN. Additional security and ERP to bedside for assist. Spit nagel placed on patient. Patient position readjusted in bed, restraints secured.

## 2023-09-16 NOTE — ED NOTES
Alis at poison control contacted. Recommendations: serial EKG every hour for next 4 hours, keep calm and cool with benzos. Monitor Temperature. If QRS >120 push sodium bicarb and repeat EKG. Case Number [id number]    ERP notified

## 2023-09-16 NOTE — ASSESSMENT & PLAN NOTE
- In an apparent suicide attempt.    - Maintain on suicide/homicide/elopement precaution.    - Continue one-on-one supervision.  - Patient met with  via Zoom, legal hold was continued.    -Psych is considering discontinuation of legal hold now that lithium is therapeutic.     [facility] vs [facility] placement but  Placement but beds are limited  Continue legal hold for now and continue to eval safe DC plan

## 2023-09-16 NOTE — ED NOTES
0507- time out performed, ERP, Shanice RN, Chace RN, Stephen RN, RT  0508- 25mg of Ketamine  0509- 25mg of ketamine  0510- pt still moving around and resisting  0511- 25mg of Ketamine  0513- 25mg of Ketamine  0513- Chace RN placed a 18G Right forearm  0514- Shanice Rn placed Temp qiu, urine return noted  0515-IV secured with coband

## 2023-09-16 NOTE — ED NOTES
Patient remains in direct line of sight of sitter, new sitter at bedside. Report provided to robert by RN.

## 2023-09-16 NOTE — ASSESSMENT & PLAN NOTE
Now resolved  - may have been due to agitation, but seroquel overdose may cause  - 1729 on admission, last check was 171  - pt moving/ambulating without pain

## 2023-09-16 NOTE — CONSULTS
BRIEF PSYCHIATRIC CONSULT NOTE: consult received. Called to speak to RN: pt is unable to be interviewed as he has maxed out on dex secondary to agitation. Will call tomorrow to see if he can be interviewed.

## 2023-09-16 NOTE — ED NOTES
Before pt arrived to the ED, received call for [employer]. They stated that when pt becomes medical clear or gets off his legal 2000 to call them because they are pressing charges for destructions of property.

## 2023-09-16 NOTE — ED NOTES
Patient brought in four point restraints by EMS. Patient able to loosen soft restraints, at risk for safety to oneself. ERP at bedside.

## 2023-09-16 NOTE — CONSULTS
"Critical Care Consult    Date of Admission: 9/16/23  Date of Consult: 9/16/23  Consulting: Dr. Andrea Pemberton  Reason for consult: SI/seroquel overdose    Chief Complaint:  Chief Complaint   Patient presents with    ALOC           Drug Overdose    Legal 2000     Patient BIB EMS with legal hold initiated by [employer]. Patient reports taking 15-20 pills of Seraquil. Patient combative in triage, arrived in four point restraints by EMS.      HPI:   From H&P: \"Peter Grace is a 38 y.o. male who presented 9/15/2023 with altered level of consciousness.  At this time, patient is agitated, nonverbal, information obtained from the chart.  Patient at the point time had told staff he wanted to kill himself and intentionally overdosed on Seroquel.  Judging by the amount of pills or left, it was felt the max he could have taken was 1250 mg.  He reportedly was seen at [facility], was combative and put on trespassing violation.  Apparently, this was prior to the overdose.  Because of this, police were called, patient at that time stated he had overdose and wanted to go to a different hospital.  Patient is either combative or medicated in the ER.  ERP did discuss the case with poison control who recommended serial EKGs.\"    Patient was admitted to the ICU on 9/16 d/t severe agitation requiring 4 point restraints in addition to the need for continuous cardiac monitoring.     24h events: Requiring leather restraints. Agitated. On Precedex drip. Responded well to IV Benadryl.   Tubes, Lines, Drains: PIVs  Drips: Precedex and LR   Nutrition: NPO  Notable lab trends: WBC 12.7. UDS + amphetamines, CPK 1729  Tmax: afebrile  Antibiotics: n/a  Cultures: n/a  PPX: Lovenox  BM regimen: MiraLAX, senna-docusate, milk magnesia, bisacodyl  Last BM: prior       Past Medical History:   Diagnosis Date    Amphetamine abuse (HCC) 9/16/2023    Anxiety     Bipolar affect, depressed (HCC)     Intentional overdose (HCC) 9/16/2023       No " "past surgical history on file.    Social History     Socioeconomic History    Marital status: Single     Spouse name: Not on file    Number of children: Not on file    Years of education: Not on file    Highest education level: Not on file   Occupational History    Not on file   Tobacco Use    Smoking status: Every Day    Smokeless tobacco: Never   Substance and Sexual Activity    Alcohol use: Yes     Comment: occ    Drug use: Not on file    Sexual activity: Not on file   Other Topics Concern    Not on file   Social History Narrative    Not on file     Social Determinants of Health     Financial Resource Strain: Not on file   Food Insecurity: Not on file   Transportation Needs: Not on file   Physical Activity: Not on file   Stress: Not on file   Social Connections: Not on file   Intimate Partner Violence: Not on file   Housing Stability: Not on file          No family history on file.    No current facility-administered medications on file prior to encounter.     Current Outpatient Medications on File Prior to Encounter   Medication Sig Dispense Refill    QUEtiapine Fumarate (SEROQUEL PO) Take  by mouth.         Allergies: Patient has no known allergies.      ROS: A 12 point ROS was performed on intake and during my interview. ROS negative unless specifically noted in HPI.     Vitals:  /65   Pulse 79   Temp 36.4 °C (97.6 °F) (Temporal)   Resp 20   Ht 1.803 m (5' 11\")   Wt 97.5 kg (214 lb 15.2 oz)   SpO2 97%     Physical Exam:  Physical Exam  Vitals reviewed. Exam conducted with a chaperone present.   Constitutional:       General: He is in acute distress.      Appearance: He is not ill-appearing, toxic-appearing or diaphoretic.   HENT:      Head: Normocephalic.      Nose: Nose normal.      Mouth/Throat:      Mouth: Mucous membranes are dry.   Eyes:      General:         Right eye: No discharge.         Left eye: No discharge.      Conjunctiva/sclera: Conjunctivae normal.   Cardiovascular:      Rate and " Rhythm: Normal rate and regular rhythm.      Pulses: Normal pulses.   Pulmonary:      Effort: Pulmonary effort is normal.      Breath sounds: Normal breath sounds.   Musculoskeletal:      Right lower leg: No edema.      Left lower leg: No edema.   Skin:     General: Skin is warm.      Capillary Refill: Capillary refill takes less than 2 seconds.      Coloration: Skin is not jaundiced.   Neurological:      General: No focal deficit present.      Mental Status: He is alert.   Psychiatric:         Attention and Perception: He is inattentive.         Mood and Affect: Mood is anxious and elated. Affect is labile.         Speech: Speech is rapid and pressured.         Behavior: Behavior is agitated, aggressive, hyperactive and combative.         Cognition and Memory: Cognition is impaired.         Judgment: Judgment is impulsive and inappropriate.       Laboratory Data: I personally reviewed labs including historical lab trends.       There is no immunization history on file for this patient.      Assessment/Plan:    Problem list:  #Intentional drug overdose  #Polysubstance abuse (amphetamine, cannabinoid)   #Rhabdomyolysis  #Metabolic encephalopathy 2/2 drug overdose    Plan:  -in 4 point leather restraints for his protection (pulling at lines and monitoring equipment)  -using multimodal treatment for his encephalopathy (frequent verbal redirection, PRN antihistamines, PRN benzodiazepines)  -monitoring QRS on telemetry. Pushes of bicarb PRN if QRS abnormally prolonged 2/2 seroquel overdose  -psychiatry consulted d/t manic episode, which may be drug induced (methamphetamine abuse).  -treating with IVF for rhabdomyolysis; monitor UOP; avoid nephrotoxins      The patient remains critically ill.  Critical care time = 85 minutes in directly providing and coordinating critical care and extensive data review.  No time overlap and excludes procedures.    __________  Alvin Pennington, DO  Pulmonary and Critical Care  [facility]    Please note that this dictation was created using voice recognition software. The accuracy of the dictation is limited to the abilities of the software. I have made every reasonable attempt to correct obvious errors, but I expect that there are errors of grammar and possibly content that I did not discover before finalizing the note.

## 2023-09-16 NOTE — H&P
Hospital Medicine History & Physical Note    Date of Service  9/16/2023    Primary Care Physician  Pcp Pt States None    Consultants  None    Specialist Names: None    Code Status  Full Code    Chief Complaint  Chief Complaint   Patient presents with    ALOC           Drug Overdose    Legal 2000     Patient BIB EMS with legal hold initiated by [employer]. Patient reports taking 15-20 pills of Seraquil. Patient combative in triage, arrived in four point restraints by EMS.        History of Presenting Illness  Peter Grace is a 38 y.o. male who presented 9/15/2023 with altered level of consciousness.  At this time, patient is agitated, nonverbal, information obtained from the chart.  Patient at the point time had told staff he wanted to kill himself and intentionally overdosed on Seroquel.  Judging by the amount of pills or left, it was felt the max he could have taken was 1250 mg.  He reportedly was seen at [facility], was combative and put on trespassing violation.  Apparently, this was prior to the overdose.  Because of this, police were called, patient at that time stated he had overdose and wanted to go to a different hospital.  Patient is either combative or medicated in the ER.  ERP did discuss the case with poison control who recommended serial EKGs.  I did discuss the case including labs with the ER physician.    I discussed the plan of care with  ERP .    Review of Systems  Review of Systems   Unable to perform ROS: Acuity of condition       Past Medical History   has a past medical history of Anxiety and Bipolar affect, depressed (HCC).    Surgical History   has no past surgical history on file.     Family History  family history is not on file.   Unable to obtain    Social History   reports that he has been smoking. He has never used smokeless tobacco. He reports current alcohol use.    Allergies  No Known Allergies    Medications  Prior to Admission Medications   Prescriptions Last Dose  Informant Patient Reported? Taking?   QUEtiapine (SEROQUEL) 100 MG Tab   No No   Sig: Take 1 Tablet by mouth 2 times a day.   clonazepam (KLONOPIN) 1 MG TABS   No No   Sig: Take 0.5 Tabs by mouth 2 times a day.   Patient not taking: Reported on 4/2/2023   erythromycin 5 MG/GM Ointment   No No   Sig: Apply pea sized amount to affected eye(s) 4 times per day, continue 48 hours past resolution of symptoms.      Facility-Administered Medications: None       Physical Exam  Temp:  [36.4 °C (97.6 °F)-36.7 °C (98.1 °F)] 36.4 °C (97.6 °F)  Pulse:  [] 97  Resp:  [12-53] 24  BP: ()/(35-62) 93/50  SpO2:  [93 %-99 %] 93 %  Blood Pressure: 93/50   Temperature: 36.4 °C (97.6 °F)   Pulse: 97   Respiration: (!) 24   Pulse Oximetry: 93 %       Physical Exam  Vitals and nursing note reviewed.   Constitutional:       General: He is not in acute distress.     Appearance: He is well-developed. He is not toxic-appearing or diaphoretic.   HENT:      Head: Normocephalic and atraumatic.      Right Ear: External ear normal.      Left Ear: External ear normal.      Nose: Nose normal. No congestion or rhinorrhea.      Mouth/Throat:      Mouth: Mucous membranes are dry.      Pharynx: No oropharyngeal exudate.   Eyes:      General:         Right eye: No discharge.         Left eye: No discharge.   Neck:      Trachea: No tracheal deviation.   Cardiovascular:      Rate and Rhythm: Normal rate and regular rhythm.      Heart sounds: No murmur heard.     No friction rub. No gallop.   Pulmonary:      Effort: Pulmonary effort is normal. No respiratory distress.      Breath sounds: Normal breath sounds. No stridor. No wheezing or rales.   Abdominal:      General: Bowel sounds are normal. There is no distension.      Palpations: Abdomen is soft.   Musculoskeletal:      Cervical back: Normal range of motion and neck supple. No edema or erythema.      Right lower leg: No edema.      Left lower leg: No edema.   Lymphadenopathy:      Cervical: No  "cervical adenopathy.   Skin:     General: Skin is warm and dry.      Findings: No erythema or rash.   Neurological:      Comments: Agitated    Psychiatric:         Speech: He is noncommunicative.         Laboratory:  Recent Labs     09/16/23 0011   WBC 12.7*   RBC 4.23*   HEMOGLOBIN 13.9*   HEMATOCRIT 41.3*   MCV 97.6   MCH 32.9   MCHC 33.7   RDW 45.1   PLATELETCT 296   MPV 8.9*     Recent Labs     09/16/23  0011   SODIUM 138   POTASSIUM 3.7   CHLORIDE 103   CO2 20   GLUCOSE 97   BUN 24*   CREATININE 1.25   CALCIUM 9.1     Recent Labs     09/16/23  0011   ALTSGPT 34   ASTSGOT 71*   ALKPHOSPHAT 80   TBILIRUBIN 0.8   GLUCOSE 97         No results for input(s): \"NTPROBNP\" in the last 72 hours.      No results for input(s): \"TROPONINT\" in the last 72 hours.    Imaging:  No orders to display       EKG:  I have personally reviewed the images and compared with prior images.    Assessment/Plan:  Justification for Admission Status  I anticipate this patient will require at least two midnights for appropriate medical management, necessitating inpatient admission because Seroquel overdose leading to severe agitation    Patient will need a ICU (Adult and Pediatrics) bed on CARDIOLOGY service .  The need is secondary to intentional Seroquel overdose with agitation.    * Agitation- (present on admission)  Assessment & Plan  Severe agitation post intentional Seroquel overdose  Patient be admitted to the ICU, placed on a Precedex drip  Multiple different medications have been attempted, some last for short amount of time but patient still becomes very agitated  If no improvement with Precedex drip, may require intubation but we will certainly try to avoid this    Patient is critically ill.   The patient continues to have : Severe agitation  The vital organ system that is effected is the: Neuro initially  If untreated there is a high chance of deterioration into: Permanent brain insult  The critical care that I am providing today " is: Precedex drip  The critical care that has been undertaken is medically complex.   There has been no overlap in critical care time.  Critical care time not including procedures, no overlap: 36 minutes    Medication overdose- (present on admission)  Assessment & Plan  Patient intentionally took excessive amounts of Seroquel  Patient is on legal hold  ERP did discuss the case with poison control who recommended serial EKGs which have been ordered  Monitor patient on telemetry    Elevated CPK- (present on admission)  Assessment & Plan  CPK level is less than 2000, with his severe agitation however it does risk increasing  Start IV fluids at a elevated rate  Repeat CPK later this morning  Normal kidney function    Leukocytosis- (present on admission)  Assessment & Plan  Likely reactive  Monitor for additional signs of infection        VTE prophylaxis: SCDs/TEDs and enoxaparin ppx

## 2023-09-16 NOTE — ED NOTES
Emy from Lab called with critical result of CPK at 1729. Critical lab result read back to Emy.   Dr. Briseno notified of critical lab result at 0052.  Critical lab result read back by Dr. Briseno

## 2023-09-17 LAB
ANION GAP SERPL CALC-SCNC: 12 MMOL/L (ref 7–16)
BUN SERPL-MCNC: 17 MG/DL (ref 8–22)
CALCIUM SERPL-MCNC: 8.1 MG/DL (ref 8.4–10.2)
CHLORIDE SERPL-SCNC: 110 MMOL/L (ref 96–112)
CO2 SERPL-SCNC: 17 MMOL/L (ref 20–33)
CREAT SERPL-MCNC: 1 MG/DL (ref 0.5–1.4)
EKG IMPRESSION: NORMAL
ERYTHROCYTE [DISTWIDTH] IN BLOOD BY AUTOMATED COUNT: 45 FL (ref 35.9–50)
GFR SERPLBLD CREATININE-BSD FMLA CKD-EPI: 100 ML/MIN/1.73 M 2
GLUCOSE SERPL-MCNC: 99 MG/DL (ref 65–99)
HCT VFR BLD AUTO: 39.9 % (ref 42–52)
HGB BLD-MCNC: 13.3 G/DL (ref 14–18)
MCH RBC QN AUTO: 32.9 PG (ref 27–33)
MCHC RBC AUTO-ENTMCNC: 33.3 G/DL (ref 32.3–36.5)
MCV RBC AUTO: 98.8 FL (ref 81.4–97.8)
PLATELET # BLD AUTO: 256 K/UL (ref 164–446)
PMV BLD AUTO: 9.1 FL (ref 9–12.9)
POTASSIUM SERPL-SCNC: 4.4 MMOL/L (ref 3.6–5.5)
RBC # BLD AUTO: 4.04 M/UL (ref 4.7–6.1)
SODIUM SERPL-SCNC: 139 MMOL/L (ref 135–145)
WBC # BLD AUTO: 7.3 K/UL (ref 4.8–10.8)

## 2023-09-17 PROCEDURE — 94760 N-INVAS EAR/PLS OXIMETRY 1: CPT

## 2023-09-17 PROCEDURE — 93010 ELECTROCARDIOGRAM REPORT: CPT | Mod: 76 | Performed by: INTERNAL MEDICINE

## 2023-09-17 PROCEDURE — 80048 BASIC METABOLIC PNL TOTAL CA: CPT

## 2023-09-17 PROCEDURE — 99291 CRITICAL CARE FIRST HOUR: CPT | Performed by: INTERNAL MEDICINE

## 2023-09-17 PROCEDURE — 700105 HCHG RX REV CODE 258: Performed by: INTERNAL MEDICINE

## 2023-09-17 PROCEDURE — 700111 HCHG RX REV CODE 636 W/ 250 OVERRIDE (IP): Mod: JZ | Performed by: INTERNAL MEDICINE

## 2023-09-17 PROCEDURE — 99292 CRITICAL CARE ADDL 30 MIN: CPT | Performed by: INTERNAL MEDICINE

## 2023-09-17 PROCEDURE — 93005 ELECTROCARDIOGRAM TRACING: CPT | Performed by: INTERNAL MEDICINE

## 2023-09-17 PROCEDURE — 700101 HCHG RX REV CODE 250: Performed by: INTERNAL MEDICINE

## 2023-09-17 PROCEDURE — 85027 COMPLETE CBC AUTOMATED: CPT

## 2023-09-17 PROCEDURE — 770022 HCHG ROOM/CARE - ICU (200)

## 2023-09-17 PROCEDURE — 93010 ELECTROCARDIOGRAM REPORT: CPT | Performed by: INTERNAL MEDICINE

## 2023-09-17 RX ORDER — HALOPERIDOL 5 MG/ML
3 INJECTION INTRAMUSCULAR 3 TIMES DAILY
Status: DISCONTINUED | OUTPATIENT
Start: 2023-09-17 | End: 2023-09-18

## 2023-09-17 RX ADMIN — DEXMEDETOMIDINE HYDROCHLORIDE 1.5 MCG/KG/HR: 400 INJECTION INTRAVENOUS at 04:13

## 2023-09-17 RX ADMIN — DEXMEDETOMIDINE HYDROCHLORIDE 1.5 MCG/KG/HR: 400 INJECTION INTRAVENOUS at 07:34

## 2023-09-17 RX ADMIN — SODIUM CHLORIDE, POTASSIUM CHLORIDE, SODIUM LACTATE AND CALCIUM CHLORIDE: 600; 310; 30; 20 INJECTION, SOLUTION INTRAVENOUS at 09:33

## 2023-09-17 RX ADMIN — DEXMEDETOMIDINE HYDROCHLORIDE 1.5 MCG/KG/HR: 400 INJECTION INTRAVENOUS at 17:27

## 2023-09-17 RX ADMIN — ENOXAPARIN SODIUM 40 MG: 100 INJECTION SUBCUTANEOUS at 18:04

## 2023-09-17 RX ADMIN — DIPHENHYDRAMINE HYDROCHLORIDE 50 MG: 50 INJECTION, SOLUTION INTRAMUSCULAR; INTRAVENOUS at 04:32

## 2023-09-17 RX ADMIN — SODIUM CHLORIDE, POTASSIUM CHLORIDE, SODIUM LACTATE AND CALCIUM CHLORIDE: 600; 310; 30; 20 INJECTION, SOLUTION INTRAVENOUS at 22:09

## 2023-09-17 RX ADMIN — DIPHENHYDRAMINE HYDROCHLORIDE 50 MG: 50 INJECTION, SOLUTION INTRAMUSCULAR; INTRAVENOUS at 18:31

## 2023-09-17 RX ADMIN — DEXMEDETOMIDINE HYDROCHLORIDE 1.5 MCG/KG/HR: 400 INJECTION INTRAVENOUS at 00:58

## 2023-09-17 RX ADMIN — HALOPERIDOL LACTATE 3 MG: 5 INJECTION, SOLUTION INTRAMUSCULAR at 09:31

## 2023-09-17 RX ADMIN — DIPHENHYDRAMINE HYDROCHLORIDE 50 MG: 50 INJECTION, SOLUTION INTRAMUSCULAR; INTRAVENOUS at 11:57

## 2023-09-17 RX ADMIN — DEXMEDETOMIDINE HYDROCHLORIDE 1.5 MCG/KG/HR: 400 INJECTION INTRAVENOUS at 20:56

## 2023-09-17 RX ADMIN — SODIUM CHLORIDE, POTASSIUM CHLORIDE, SODIUM LACTATE AND CALCIUM CHLORIDE: 600; 310; 30; 20 INJECTION, SOLUTION INTRAVENOUS at 17:52

## 2023-09-17 RX ADMIN — SODIUM CHLORIDE, POTASSIUM CHLORIDE, SODIUM LACTATE AND CALCIUM CHLORIDE: 600; 310; 30; 20 INJECTION, SOLUTION INTRAVENOUS at 01:25

## 2023-09-17 RX ADMIN — HALOPERIDOL LACTATE 3 MG: 5 INJECTION, SOLUTION INTRAMUSCULAR at 20:11

## 2023-09-17 RX ADMIN — LORAZEPAM 2 MG: 2 INJECTION INTRAMUSCULAR; INTRAVENOUS at 13:34

## 2023-09-17 RX ADMIN — SODIUM CHLORIDE, POTASSIUM CHLORIDE, SODIUM LACTATE AND CALCIUM CHLORIDE: 600; 310; 30; 20 INJECTION, SOLUTION INTRAVENOUS at 05:32

## 2023-09-17 RX ADMIN — DEXMEDETOMIDINE HYDROCHLORIDE 1.5 MCG/KG/HR: 400 INJECTION INTRAVENOUS at 10:52

## 2023-09-17 RX ADMIN — LORAZEPAM 2 MG: 2 INJECTION INTRAMUSCULAR; INTRAVENOUS at 04:31

## 2023-09-17 RX ADMIN — HALOPERIDOL LACTATE 3 MG: 5 INJECTION, SOLUTION INTRAMUSCULAR at 15:22

## 2023-09-17 RX ADMIN — SODIUM CHLORIDE, POTASSIUM CHLORIDE, SODIUM LACTATE AND CALCIUM CHLORIDE: 600; 310; 30; 20 INJECTION, SOLUTION INTRAVENOUS at 13:38

## 2023-09-17 RX ADMIN — DEXMEDETOMIDINE HYDROCHLORIDE 1.5 MCG/KG/HR: 400 INJECTION INTRAVENOUS at 13:59

## 2023-09-17 ASSESSMENT — PAIN DESCRIPTION - PAIN TYPE
TYPE: ACUTE PAIN

## 2023-09-17 NOTE — PROGRESS NOTES
We have been unable to perform the serial EKG's due to patient agitation and aggression. Primary team is aware, will attempt when safe to do so.

## 2023-09-17 NOTE — PROGRESS NOTES
12-hour chart check complete.    Monitor Summary  Rhythm: SB / SR  Rate: 53-83  Ectopy: rPVC  Measurements: 0.12/0.08/0.44

## 2023-09-17 NOTE — PROGRESS NOTES
12-hour chart check complete.    Monitor Summary  Rhythm: SB/SR   Rate: 42-61, down to 38  Ectopy: rPVC, rPAC  Measurements: 0.12/0.08/0.42

## 2023-09-17 NOTE — PROGRESS NOTES
"Patient extremely hostile and combative yelling and using profanity at all staff, and verbally threatening harm to staff. Patient yelled :\"I just found my sister's head in a box with lyme and if I get out of this you're going to be that way too! You whore! Get out of here! Get the fuck out of here! Let me go! Let me go! Let me goooooooo! Let me the fuck go!\"  "

## 2023-09-17 NOTE — CARE PLAN
The patient is Watcher - Medium risk of patient condition declining or worsening    Shift Goals  Clinical Goals: Decreased agitation  Patient Goals: RUPERT  Family Goals: RUPERT    Patient remains in 4 point leather restraints and maxed on Precedex. Continues to be agitated and verbally abusive towards staff.     Progress made toward(s) clinical / shift goals:    Problem: Pain - Standard  Goal: Alleviation of pain or a reduction in pain to the patient’s comfort goal  Outcome: Progressing       Patient is not progressing towards the following goals:      Problem: Safety - Violent/Self-destructive Restraint  Goal: Free from restraints (Restraint for Violent or Self-Destructive Behavior)  Outcome: Not Progressing     Problem: Knowledge Deficit - Standard  Goal: Patient and family/care givers will demonstrate understanding of plan of care, disease process/condition, diagnostic tests and medications  Outcome: Not Progressing

## 2023-09-17 NOTE — PROGRESS NOTES
"Critical Care Progress Note    Date of Admission: 9/16/23  Date of Consult: 9/16/23  Consulting: Dr. Andrea Pemberton  Reason for consult: SI/seroquel overdose    Chief Complaint:  Chief Complaint   Patient presents with    ALOC           Drug Overdose    Legal 2000     Patient BIB EMS with legal hold initiated by [employer]. Patient reports taking 15-20 pills of Seraquil. Patient combative in triage, arrived in four point restraints by EMS.      HPI:   From H&P: \"Peter Grace is a 38 y.o. male who presented 9/15/2023 with altered level of consciousness.  At this time, patient is agitated, nonverbal, information obtained from the chart.  Patient at the point time had told staff he wanted to kill himself and intentionally overdosed on Seroquel.  Judging by the amount of pills or left, it was felt the max he could have taken was 1250 mg.  He reportedly was seen at [facility], was combative and put on trespassing violation.  Apparently, this was prior to the overdose.  Because of this, police were called, patient at that time stated he had overdose and wanted to go to a different hospital.  Patient is either combative or medicated in the ER.  ERP did discuss the case with poison control who recommended serial EKGs.\"    Patient was admitted to the ICU on 9/16 d/t severe agitation requiring 4 point restraints in addition to the need for continuous cardiac monitoring.     9/16: Requiring 4 point restraints, frequent redirecting, IV Ativan and IV Benadryl in addition to Precedex drip.     24h events: Bradycardia overnight. Very agitated overnight. Getting PRN Ativan and Benadryl.   Tubes, Lines, Drains: PIVs  Drips: Precedex and LR   Nutrition: NPO  Notable lab trends: WBC 12.7. UDS + amphetamines, CPK 1729  Tmax: afebrile  Antibiotics: n/a  Cultures: n/a  PPX: Lovenox  BM regimen: MiraLAX, senna-docusate, milk magnesia, bisacodyl  Last BM: prior       Scheduled Medications   Medication Dose Frequency    " "enoxaparin (LOVENOX) injection  40 mg DAILY AT 1800       Allergies: Patient has no known allergies.      ROS: A 12 point ROS was performed on intake and during my interview. ROS negative unless specifically noted in HPI.     Vitals:  /71   Pulse (!) 59   Temp 36.3 °C (97.3 °F) (Bladder)   Resp 14   Ht 1.803 m (5' 11\")   Wt 97.5 kg (214 lb 15.2 oz)   SpO2 98%     Physical Exam:  Physical Exam  Vitals reviewed. Exam conducted with a chaperone present.   Constitutional:       General: He is in acute distress.      Appearance: He is not ill-appearing, toxic-appearing or diaphoretic.   HENT:      Head: Normocephalic.      Nose: Nose normal.      Mouth/Throat:      Mouth: Mucous membranes are dry.   Eyes:      General:         Right eye: No discharge.         Left eye: No discharge.      Conjunctiva/sclera: Conjunctivae normal.   Cardiovascular:      Rate and Rhythm: Normal rate and regular rhythm.      Pulses: Normal pulses.   Pulmonary:      Effort: Pulmonary effort is normal.      Breath sounds: Normal breath sounds.   Musculoskeletal:      Right lower leg: No edema.      Left lower leg: No edema.   Skin:     General: Skin is warm.      Capillary Refill: Capillary refill takes less than 2 seconds.      Coloration: Skin is not jaundiced.   Neurological:      General: No focal deficit present.      Mental Status: He is alert.   Psychiatric:         Attention and Perception: He is inattentive.         Mood and Affect: Mood is anxious and elated. Affect is labile.         Behavior: Behavior is agitated, aggressive, hyperactive and combative.         Cognition and Memory: Cognition is impaired.         Judgment: Judgment is impulsive and inappropriate.       Laboratory Data: I personally reviewed labs including historical lab trends.       There is no immunization history on file for this patient.      Assessment/Plan:    Problem list:  #Intentional drug overdose  #Polysubstance abuse (amphetamine, cannabinoid) "   #Rhabdomyolysis  #Metabolic encephalopathy 2/2 drug overdose    Plan:  -in 4 point leather restraints for his protection (pulling at lines and monitoring equipment)  -using multimodal treatment for his encephalopathy (frequent verbal redirection, PRN antihistamines, PRN benzodiazepines)  -monitoring QRS on telemetry. Pushes of bicarb PRN if QRS abnormally prolonged 2/2 seroquel overdose  -the half life of seroquel is 6 hours; anticipate that he will be cleared of the drug this afternoon (30 hours total).   -psychiatry consulted d/t manic episode, which may be drug induced (methamphetamine abuse). Appreciate their support. Adding Haldol 3 mg IV tid today.   -treating with IVF for rhabdomyolysis; monitor UOP; avoid nephrotoxins      The patient remains critically ill.  Critical care time = 65 minutes in directly providing and coordinating critical care and extensive data review.  No time overlap and excludes procedures.    __________  Alvin Pennington, DO  Pulmonary and Critical Care Medicine  [facility]    Please note that this dictation was created using voice recognition software. The accuracy of the dictation is limited to the abilities of the software. I have made every reasonable attempt to correct obvious errors, but I expect that there are errors of grammar and possibly content that I did not discover before finalizing the note.

## 2023-09-17 NOTE — PROGRESS NOTES
"Patient agitation and aggression increased. Therapeutic communication failed. He proceeded to bite off his pulse ox probe while saying \" I want to kill all of you here.\" Patient trying to kick, roll over to the side and bite staff members; when unsuccessful with attempt, he tried taking his qiu out, when it met resistance, he pulled the qiu temperature probe out completely. Patient was educated on the importance of medical equipment with no evidence of learning.   "

## 2023-09-17 NOTE — PROGRESS NOTES
Patient SB in the high 40s the beginning of the night. Patient dropped to low 40s and continuously dipped to 38. Patient asymptomatic. Dr. Pemberton notified; ok with HR in high 30s. Will continue to monitor.

## 2023-09-17 NOTE — PROGRESS NOTES
12-hour chart check complete.    Monitor Summary  Rhythm: SR/SB  Rate: 32-98  Ectopy: rPVC, rPAC  Measurements: 0.16/0.08/0.36

## 2023-09-17 NOTE — CARE PLAN
Problem: Safety - Violent/Self-destructive Restraint  Goal: Remains free of injury from restraints (Restraint for Violent/Self-Destructive Behavior)  Outcome: Progressing  Goal: Free from restraints (Restraint for Violent or Self-Destructive Behavior)  Outcome: Progressing     Problem: Knowledge Deficit - Standard  Goal: Patient and family/care givers will demonstrate understanding of plan of care, disease process/condition, diagnostic tests and medications  Outcome: Progressing     Problem: Skin Integrity  Goal: Skin integrity is maintained or improved  Outcome: Progressing     Problem: Fall Risk  Goal: Patient will remain free from falls  Outcome: Progressing     Problem: Safety - Medical Restraint  Goal: Remains free of injury from restraints (Restraint for Interference with Medical Device)  Outcome: Progressing  Goal: Free from restraint(s) (Restraint for Interference with Medical Device)  Outcome: Progressing   The patient is Watcher - Medium risk of patient condition declining or worsening    Shift Goals  Clinical Goals: Rest, decreased agitation

## 2023-09-17 NOTE — CONSULTS
[facility] PSYCHIATRIC CONSULT:Intake  Reason for admission: reported taking 15-20 pills of seroquel. Was combative. He had a prescription bottle with him that was filled 3 days ago that was empty per report.  Police were called as the patient was reported as trespassing and when he was in the police car patient started screaming that he was suicidal and that he overdosed on Seroquel and EMS was called and the patient requested to go to a different facility other than [facility]. The most he could have taken was around 1,250 mg.    Consulting Physician/APN/PA: Alvin Pennington D.O.  Reason for Consult: SA via OD, psychosis, aggression, +amphetamines  Consultant: Katy Falcon MD    Legal Status  on hold      CC: none  HPI: 37 yo male who is unable to provide a meaningful hx and keeps his eyes closed throughout. His chest has glitter on it. When asked orientation questions he suddenly said he was born in [facility] and seemed to mention [facility]. Per staff he has also said he was born in 1926.        Chart(s) Review:  2014: anxiety, recent move from [place]. No indication of PSA. Given klonopin 0.5 mg bid  2014 multiple visits: anxiety, bipolar disorder: no information   5/2023 drinks alcohol 3-4 times a week and drank alcohol this morning. States that 3 years ago he was on lithium in the need to prove that he does not have lithium in his system.... He does have a history of bipolar disorder but he says it is well controlled with marijuana. Patient does not take any other medications. May have been homeless.  6/2023: history anxiety, presents with a panic attack/anxiety today... was on long-term medications for a while such as hydroxyzine. Marijuana, Methamphetamines . Given seroquel 50 mg hs.  He says he has had diagnosis in the past of schizoaffective as well   8/2023: came in for contusion of buttock. Given dx of stimulant use disorder. psychomotor agitation.   8/2023: brought to the ED  "by [employer] today for homicidal ideation having made threats to roommates.  Reports that \"someone hacked into my phone about 6 months ago and has been making fun of me\". Roommates very fearful and indicated an acute change in behavior. Pt attempted to use drugs that was brought w/ his belongings. Pt became aggressive when asked to stop and was escorted out by security. Returned.Verbally aggressive with staff.... urinated on the floor multiple times... noncompliant with medications    Medical ROS:  Review of systems unable to participate    Psychiatric Exam (MSE):  Vitals:Blood pressure 127/78, pulse (!) 58, temperature 35.9 °C (96.6 °F), temperature source Bladder, resp. rate 18, height 1.803 m (5' 11\"), weight 97.5 kg (214 lb 15.2 oz), SpO2 97 %.    Constitutional: normal habitus, glitter on chest, 4 pt leather restraints, cannot cooperate meaningfully.  General Appearance: as noted  Musculoskeletal: as noted above  Alert/Orientation: x2 briefly as noted. Alertness is diminished  Attn/Concentration: poor  Fund of Knowledge:unable to assess  Memory recent/remote: unable to assess  Speech: mumbles most of the time  Language: fluent when he said he was born in [place], otherwise mumbles and is intelligible  Thought Content:+ psychosis per notes. Unable to assess   SI/HI .   Thought Process: unable to assess  Insight/Judgement:impaired  Mood: not identified  Affect: at times seemed to smile a bit.     Past Medical Hx:     Past Medical History:   Diagnosis Date    Amphetamine abuse (HCC) 9/16/2023    Anxiety     Bipolar affect, depressed (HCC)     Intentional overdose (HCC) 9/16/2023       Past Psychiatric Hx:  SI/SAs:  Hospitalizations:   Dx:  Medication Trials  Violence/HI:     Family Psych Hx:  No family history on file.    Social Hx:  Housing:  Financial:  Support:  Education:  Abuse:  Drugs/Alcohol:     Labs:  Lab Results   Component Value Date/Time    AMPHUR Positive (A) 09/16/2023 0525    BARBSURINE Negative 09/16/2023 " 0525    BENZODIAZU Positive (A) 09/16/2023 0525    COCAINEMET Negative 09/16/2023 0525    METHADONE Negative 09/16/2023 0525    OPIATES Negative 09/16/2023 0525    OXYCODN Negative 09/16/2023 0525    PCPURINE Negative 09/16/2023 0525    PROPOXY Negative 09/16/2023 0525    CANNABINOID Positive (A) 09/16/2023 0525     Recent Labs     09/16/23  0011 09/17/23  0250   WBC 12.7* 7.3   RBC 4.23* 4.04*   HEMOGLOBIN 13.9* 13.3*   HEMATOCRIT 41.3* 39.9*   MCV 97.6 98.8*   MCH 32.9 32.9   RDW 45.1 45.0   PLATELETCT 296 256   MPV 8.9* 9.1   NEUTSPOLYS 78.20*  --    LYMPHOCYTES 13.70*  --    MONOCYTES 4.90  --    EOSINOPHILS 2.10  --    BASOPHILS 0.30  --      Recent Labs     09/16/23  0011 09/16/23  1445 09/17/23  0250   SODIUM 138  --  139   POTASSIUM 3.7  --  4.4   CHLORIDE 103  --  110   CO2 20  --  17*   GLUCOSE 97  --  99   BUN 24*  --  17   CPKTOTAL 1729* 916*  --        Cranial Imaging: none      EKG: QTc:  439       Meds Current:  Scheduled Medications   Medication Dose Frequency    enoxaparin (LOVENOX) injection  40 mg DAILY AT 1800     Allergies: Patient has no known allergies.      Assessement    1.Methamphetamine use disorder  2 Psychotic disorder unspc     -R/O secondary to substance use  3  hx of bipolar disorder, schizoaffective disorder but not documented supporting documentation of symptoms so not clear whether he has a primary psychiatric disorder or not.  4  THC use  5   Encephalopathy, likely substance induced.    Medical:    -none acutely      Recommendations:  Legal Status:  on hold. Continue to reassess    Observation status:   -line of site with sitter   Visitors:  no  Personal belongings:no    Discussed/voalted: RASHID Pennington DO    Medication and Other Recommendations: final orders as per Tx Tm   Per the prescription bottle, given intake calculation of potential mg over dosed on, he is on 50 mg hs. Per pharmacy he is on seroquel no mg listed and per another note he is on seroquel 100 mg bid, klonopin 0.5  mg bid . Other than seroquel being the common factor, it is unclear what he takes, the dose he is supposed to take, etc.   2   klonopin is long acting, WD sz, if he has been taking any, is low.   3   recommend haldol IV 3 mg tid independently of whether he is encephalopathic or if this is drug induced.   4   if any signs of EPS, use benadryl 50 mg IV tid. It can also be added to haldol if pt is very agitated.     Will continue to follow with you.    Thank you for the consult.        Discharge recommendations: TBD     If released from [facility]: Discharge Instructions:  -Reviewed safety plan: 911, ER, PCM, MHC, Suicide crisis line  -Please assist with outpatient Psychiatric/substance use follow up appointments at discharge once medically cleared.

## 2023-09-18 LAB
ALBUMIN SERPL BCP-MCNC: 2.5 G/DL (ref 3.2–4.9)
ALBUMIN/GLOB SERPL: 1.2 G/DL
ALP SERPL-CCNC: 61 U/L (ref 30–99)
ALT SERPL-CCNC: 19 U/L (ref 2–50)
ANION GAP SERPL CALC-SCNC: 11 MMOL/L (ref 7–16)
AST SERPL-CCNC: 21 U/L (ref 12–45)
BILIRUB SERPL-MCNC: <0.2 MG/DL (ref 0.1–1.5)
BUN SERPL-MCNC: 11 MG/DL (ref 8–22)
CALCIUM ALBUM COR SERPL-MCNC: 9.3 MG/DL (ref 8.5–10.5)
CALCIUM SERPL-MCNC: 8.1 MG/DL (ref 8.4–10.2)
CHLORIDE SERPL-SCNC: 107 MMOL/L (ref 96–112)
CK SERPL-CCNC: 171 U/L (ref 0–154)
CO2 SERPL-SCNC: 21 MMOL/L (ref 20–33)
CREAT SERPL-MCNC: 1.02 MG/DL (ref 0.5–1.4)
EKG IMPRESSION: NORMAL
GFR SERPLBLD CREATININE-BSD FMLA CKD-EPI: 97 ML/MIN/1.73 M 2
GLOBULIN SER CALC-MCNC: 2.1 G/DL (ref 1.9–3.5)
GLUCOSE SERPL-MCNC: 106 MG/DL (ref 65–99)
LITHIUM SERPL-MCNC: 0.1 MMOL/L (ref 0.6–1.2)
POTASSIUM SERPL-SCNC: 3.7 MMOL/L (ref 3.6–5.5)
PROT SERPL-MCNC: 4.6 G/DL (ref 6–8.2)
SODIUM SERPL-SCNC: 139 MMOL/L (ref 135–145)

## 2023-09-18 PROCEDURE — 99291 CRITICAL CARE FIRST HOUR: CPT | Performed by: INTERNAL MEDICINE

## 2023-09-18 PROCEDURE — 82550 ASSAY OF CK (CPK): CPT

## 2023-09-18 PROCEDURE — 94760 N-INVAS EAR/PLS OXIMETRY 1: CPT

## 2023-09-18 PROCEDURE — 80178 ASSAY OF LITHIUM: CPT

## 2023-09-18 PROCEDURE — 700105 HCHG RX REV CODE 258: Performed by: INTERNAL MEDICINE

## 2023-09-18 PROCEDURE — 770022 HCHG ROOM/CARE - ICU (200)

## 2023-09-18 PROCEDURE — 700102 HCHG RX REV CODE 250 W/ 637 OVERRIDE(OP): Performed by: STUDENT IN AN ORGANIZED HEALTH CARE EDUCATION/TRAINING PROGRAM

## 2023-09-18 PROCEDURE — A9270 NON-COVERED ITEM OR SERVICE: HCPCS | Performed by: STUDENT IN AN ORGANIZED HEALTH CARE EDUCATION/TRAINING PROGRAM

## 2023-09-18 PROCEDURE — 700101 HCHG RX REV CODE 250: Performed by: INTERNAL MEDICINE

## 2023-09-18 PROCEDURE — 700111 HCHG RX REV CODE 636 W/ 250 OVERRIDE (IP): Mod: JZ | Performed by: INTERNAL MEDICINE

## 2023-09-18 PROCEDURE — 93010 ELECTROCARDIOGRAM REPORT: CPT | Performed by: INTERNAL MEDICINE

## 2023-09-18 PROCEDURE — 80053 COMPREHEN METABOLIC PANEL: CPT

## 2023-09-18 PROCEDURE — 99292 CRITICAL CARE ADDL 30 MIN: CPT | Performed by: INTERNAL MEDICINE

## 2023-09-18 RX ORDER — HALOPERIDOL 1 MG/1
3 TABLET ORAL DAILY
Status: DISCONTINUED | OUTPATIENT
Start: 2023-09-18 | End: 2023-09-18

## 2023-09-18 RX ORDER — NICOTINE 21 MG/24HR
1 PATCH, TRANSDERMAL 24 HOURS TRANSDERMAL
Status: DISCONTINUED | OUTPATIENT
Start: 2023-09-18 | End: 2023-10-06 | Stop reason: HOSPADM

## 2023-09-18 RX ORDER — HALOPERIDOL 1 MG/1
3 TABLET ORAL DAILY
Status: DISCONTINUED | OUTPATIENT
Start: 2023-09-19 | End: 2023-09-19

## 2023-09-18 RX ORDER — HALOPERIDOL 1 MG/1
3 TABLET ORAL
Status: COMPLETED | OUTPATIENT
Start: 2023-09-18 | End: 2023-09-18

## 2023-09-18 RX ADMIN — ENOXAPARIN SODIUM 40 MG: 100 INJECTION SUBCUTANEOUS at 18:08

## 2023-09-18 RX ADMIN — DEXMEDETOMIDINE HYDROCHLORIDE 1.5 MCG/KG/HR: 400 INJECTION INTRAVENOUS at 18:07

## 2023-09-18 RX ADMIN — DEXMEDETOMIDINE HYDROCHLORIDE 1.5 MCG/KG/HR: 400 INJECTION INTRAVENOUS at 10:55

## 2023-09-18 RX ADMIN — DIPHENHYDRAMINE HYDROCHLORIDE 50 MG: 50 INJECTION, SOLUTION INTRAMUSCULAR; INTRAVENOUS at 19:25

## 2023-09-18 RX ADMIN — DEXMEDETOMIDINE HYDROCHLORIDE 1.5 MCG/KG/HR: 400 INJECTION INTRAVENOUS at 00:39

## 2023-09-18 RX ADMIN — DEXMEDETOMIDINE HYDROCHLORIDE 1.5 MCG/KG/HR: 400 INJECTION INTRAVENOUS at 07:24

## 2023-09-18 RX ADMIN — SODIUM CHLORIDE, POTASSIUM CHLORIDE, SODIUM LACTATE AND CALCIUM CHLORIDE: 600; 310; 30; 20 INJECTION, SOLUTION INTRAVENOUS at 10:53

## 2023-09-18 RX ADMIN — HALOPERIDOL 3 MG: 1 TABLET ORAL at 20:22

## 2023-09-18 RX ADMIN — NICOTINE 14 MG: 14 PATCH TRANSDERMAL at 14:02

## 2023-09-18 RX ADMIN — DEXMEDETOMIDINE HYDROCHLORIDE 1.5 MCG/KG/HR: 400 INJECTION INTRAVENOUS at 14:29

## 2023-09-18 RX ADMIN — HALOPERIDOL LACTATE 3 MG: 5 INJECTION, SOLUTION INTRAMUSCULAR at 08:01

## 2023-09-18 RX ADMIN — SODIUM CHLORIDE, POTASSIUM CHLORIDE, SODIUM LACTATE AND CALCIUM CHLORIDE: 600; 310; 30; 20 INJECTION, SOLUTION INTRAVENOUS at 06:31

## 2023-09-18 RX ADMIN — SODIUM CHLORIDE, POTASSIUM CHLORIDE, SODIUM LACTATE AND CALCIUM CHLORIDE: 600; 310; 30; 20 INJECTION, SOLUTION INTRAVENOUS at 19:22

## 2023-09-18 RX ADMIN — SODIUM CHLORIDE, POTASSIUM CHLORIDE, SODIUM LACTATE AND CALCIUM CHLORIDE: 600; 310; 30; 20 INJECTION, SOLUTION INTRAVENOUS at 02:52

## 2023-09-18 RX ADMIN — DEXMEDETOMIDINE HYDROCHLORIDE 1.5 MCG/KG/HR: 400 INJECTION INTRAVENOUS at 04:00

## 2023-09-18 RX ADMIN — DEXMEDETOMIDINE HYDROCHLORIDE 1.5 MCG/KG/HR: 400 INJECTION INTRAVENOUS at 21:10

## 2023-09-18 ASSESSMENT — PATIENT HEALTH QUESTIONNAIRE - PHQ9
1. LITTLE INTEREST OR PLEASURE IN DOING THINGS: NOT AT ALL
2. FEELING DOWN, DEPRESSED, IRRITABLE, OR HOPELESS: NOT AT ALL
SUM OF ALL RESPONSES TO PHQ9 QUESTIONS 1 AND 2: 0

## 2023-09-18 ASSESSMENT — PAIN DESCRIPTION - PAIN TYPE
TYPE: ACUTE PAIN

## 2023-09-18 NOTE — PROGRESS NOTES
Update  I personally re-evaluated at the patient at 18:49   Unfortunately, he continues to rip out Ivs and threaten staff despite increasing doses of anxiolytics and antipsychotics.   We do need to continue violent restraints for his protection and for staff.   He lacks capacity and is on a legal hold.   Awaiting full formal psych consult.     Additional ICU time: 15 minutes    Alvin Pennington D.O.

## 2023-09-18 NOTE — PROGRESS NOTES
"Critical Care Progress Note    Date of Admission: 9/16/23  Date of Consult: 9/16/23  Consulting: Dr. Andrea Pemberton  Reason for consult: SI/seroquel overdose    Chief Complaint:  Chief Complaint   Patient presents with    ALOC           Drug Overdose    Legal 2000     Patient BIB EMS with legal hold initiated by [employer]. Patient reports taking 15-20 pills of Seraquil. Patient combative in triage, arrived in four point restraints by EMS.      HPI:   From H&P: \"Peter Grace is a 38 y.o. male who presented 9/15/2023 with altered level of consciousness.  At this time, patient is agitated, nonverbal, information obtained from the chart.  Patient at the point time had told staff he wanted to kill himself and intentionally overdosed on Seroquel.  Judging by the amount of pills or left, it was felt the max he could have taken was 1250 mg.  He reportedly was seen at [facility], was combative and put on trespassing violation.  Apparently, this was prior to the overdose.  Because of this, police were called, patient at that time stated he had overdose and wanted to go to a different hospital.  Patient is either combative or medicated in the ER.  ERP did discuss the case with poison control who recommended serial EKGs.\"    Patient was admitted to the ICU on 9/16 d/t severe agitation requiring 4 point restraints in addition to the need for continuous cardiac monitoring.     9/16: Requiring 4 point restraints, frequent redirecting, IV Ativan and IV Benadryl in addition to Precedex drip.   9/17: the same  9/18: somewhat orientable but still very aggressive and psychiatry has been following and extended hold  9/19: still flight of ideas but better weaning off precedex    Scheduled Medications   Medication Dose Frequency    haloperidol lactate  3 mg TID    enoxaparin (LOVENOX) injection  40 mg DAILY AT 1800       Allergies: Patient has no known allergies.      ROS:denies pain    Vitals:  /77   Pulse 67   " "Temp 36.3 °C (97.4 °F) (Temporal)   Resp 13   Ht 1.803 m (5' 11\")   Wt 97.5 kg (214 lb 15.2 oz)   SpO2 97%     Physical Exam:  Physical Exam  Vitals reviewed. Exam conducted with a chaperone present.   Constitutional:       Appearance: He is not ill-appearing, toxic-appearing or diaphoretic.   HENT:      Head: Normocephalic.      Nose: Nose normal.      Mouth/Throat:      Mouth: Mucous membranes are dry.   Eyes:      General:         Right eye: No discharge.         Left eye: No discharge.      Conjunctiva/sclera: Conjunctivae normal.   Cardiovascular:      Rate and Rhythm: Normal rate and regular rhythm.      Pulses: Normal pulses.   Pulmonary:      Effort: Pulmonary effort is normal.      Breath sounds: Normal breath sounds.   Musculoskeletal:      Right lower leg: No edema.      Left lower leg: No edema.   Skin:     General: Skin is warm.      Capillary Refill: Capillary refill takes less than 2 seconds.      Coloration: Skin is not jaundiced.   Neurological:      General: No focal deficit present.      Mental Status: He is alert.      Comments: Oriented to place and name  Not date  Very impulsive  Swearing  Moving all 4 extremities   Psychiatric:         Attention and Perception: He is inattentive.         Mood and Affect: Mood is elated. Affect is labile.         Behavior: Behavior is aggressive and hyperactive.         Judgment: Judgment is impulsive and inappropriate.              Assessment/Plan:    Problem list:  #Intentional drug overdose  Seroquel  On legal hold and pt acknowledges it  Appreciate psychiatry consultation  [place] to start today  Weaning off precedex  On haldol tid    #Polysubstance abuse (amphetamine, cannabinoid)   Counseling when appropriate    #Rhabdomyolysis  Improved    #Metabolic encephalopathy 2/2 drug overdose  Clearing now elated likely due to active manic episode      The patient remains critically ill.  Critical care time = 32 minutes in directly providing and coordinating " critical care and extensive data review.  No time overlap and excludes procedures.        Please note that this dictation was created using voice recognition software. The accuracy of the dictation is limited to the abilities of the software. I have made every reasonable attempt to correct obvious errors, but I expect that there are errors of grammar and possibly content that I did not discover before finalizing the note.

## 2023-09-18 NOTE — PROGRESS NOTES
"Patient becoming more agitated and aggressive. Patient yelling at all staff stating \"you are all trying to kill me\". After giving the patient water he stated \"I'm going to take off my restraints and lines and kill myself if you don't give me food\" and \"sit on my face since you won't give me food\". Patient now threatening to kill staff. Therapeutic communication and de-escalation ineffective.  "

## 2023-09-18 NOTE — PROGRESS NOTES
Patient pulled all leads off his chest and started swinging the lines at staff members as if it were a whip. He then proceeded to rip pull on his IV line and break the LR line in half. He had a grasp on the line and was trying to swing the IV pole at staff. Security arrived and helped de-escalate the situation.

## 2023-09-18 NOTE — PROGRESS NOTES
12-hour chart check complete.    Monitor Summary  Rhythm: SB/SR   Rate: 47-67  Ectopy: rPVC, rPAC  Measurements: 0.13/0.08/0.44

## 2023-09-18 NOTE — PROGRESS NOTES
"Contacted Mariela Guerrero (sister) [phone]. Update given. All of patient's family is safe including other sister Shelly per Mariela. She reports \"he does this all the time\". She is his main point of contact, but lives in [place] and it is not possible for him to come to her house at discharge.  "

## 2023-09-18 NOTE — CARE PLAN
The patient is Watcher - Medium risk of patient condition declining or worsening    Shift Goals  Clinical Goals: RASS -1 to +1, decreased agitation, rest  Patient Goals: Drink water  Family Goals: RUPERT    Progress made toward(s) clinical / shift goals:    Problem: Pain - Standard  Goal: Alleviation of pain or a reduction in pain to the patient’s comfort goal  Outcome: Progressing     Problem: Hemodynamics  Goal: Patient's hemodynamics, fluid balance and neurologic status will be stable or improve  Outcome: Progressing       Patient is not progressing towards the following goals:      Problem: Safety - Violent/Self-destructive Restraint  Goal: Free from restraints (Restraint for Violent or Self-Destructive Behavior)  Outcome: Not Progressing

## 2023-09-18 NOTE — CONSULTS
"PSYCHIATRIC CONSULTATION:  Reason for admission: reported taking 15-20 pills of seroquel. Was combative. He had a prescription bottle with him that was filled 3 days ago that was empty per report.  Police were called as the patient was reported as trespassing and when he was in the police car patient started screaming that he was suicidal and that he overdosed on Seroquel and EMS was called and the patient requested to go to a different facility other than [facility]. The most he could have taken was around 1,250 mg.    Consulting Physician/APN/PA: Alvin Pennington D.O.  Reason for Consult: SA via OD, psychosis, aggression, +amphetamines  Current Physician: MARY Ness*      Legal status:  extended    Chief Complaint: I found a body    HPI: Peter Grace is a 38 y.o. year old male with a reported PMH of schizoaffective disorder without clear documentation, psychosis, methamphetamine abuse and THC abuse.     Patient was more alert and able to participate in an eval to some degree. Remains tangential and labile with poor insight and impaired recollection.     Patient denied SI currently. Reports he did take all of his seroquel and that it was \"to forget\" the body he found and his perceived sister's murder. He is ambivalent if it was a suicide attempt however statements while intoxicated suggest so. He denied current depression or anxiety and wants to be released.     Continues to report paranoid and delusional ideation. Believes he found a body in a box and it is attached to the cartel. He is concrete in this ideation. He no longer believes it was his sister. He provided a rambling and disorganized story about finding the box, alerting security, hearing people in a park discussing \"disposing of something\" and dosing his neighbor with seroquel so he would discuss the murder. Overall his story is disjointed and the timeline does not make logical sense.     Patient reports a hx of " similar ideation since witnessing a murder in [employer] His sister has confirmed this ideation is recurrent and associated with drug use. Unable to assess for PTSD given current ideation and disorganization.    He does not recall making threatening statements nor the period between taking his medication and today. He denied intent to harm anyone and was apologetic regarding his threats.     He denied AH/VH.     Perseveration on his ideation prevented screening for past manic episodes. Current presentation has some components however clear diagnosis confounded by methamphetamine use.     Patient was aware of his medication regimen. Reported lithium 450 BID, quetiapine 50 mg qHS and hydroxyzine 50 mg as needed. This was confirmed by the pill bottles in his possession. He believes he has been compliant with lithium. Medications are well tolerated.       Staff report he remains labile but with less agitation than yesterday. Made threats last night but not since. No aggression since moving to soft restraints today. Recent hypersexual behavior. Ongoing delusional ideation as above. Sleep is intermittent with no regular pattern. Speech more intelligible today.    Per staff, Sister confirmed schzioaffective dx and a history of similar ideation since 20, most often associated with drug use.         Medical Review of Systems: as reported by pt. Only those found to be + are noted below. All others are negative.         Psychiatric Examination: observed phenomenon:  Vitals:   Vitals:    09/18/23 0900 09/18/23 1000 09/18/23 1100 09/18/23 1200   BP: 102/55 111/61 116/71 137/78   Pulse: 80 72 76 (!) 53   Resp: 16 16 (!) 33 (!) 11   Temp:    36.7 °C (98 °F)   TempSrc:    Temporal   SpO2: 97% 97% 96% 97%   Weight:       Height:         General Appearance:  normal habitus, intermittent eye contact, cooperative, and friendly  Abnormal Movements: none   Gait and Posture: not observed  Speech: slurred, slowed, and verbose  Thought  Process: slow rate  Associations:   loose, disorganized, and circumstantial  Abnormal or Psychotic Thoughts: delusions  Judgement and Insight: impaired   Orientation: disoriented  Recent and Remote Memory: short term memory and impaired  Attention Span and Concentration: distracted and somnolent  Language:spontaneous  Fund of Knowledge: good  Mood and Affect: labile, almost euphoric on assessment  SI/HI:  suicidal - no and homicidal - no  MMSE score and/or clock drawing:not applicable       Family Psychiatric Hx:  Unknown      Social History     Socioeconomic History    Marital status: Single     Spouse name: Not on file    Number of children: Not on file    Years of education: Not on file    Highest education level: Not on file   Occupational History    Not on file   Tobacco Use    Smoking status: Every Day    Smokeless tobacco: Never   Substance and Sexual Activity    Alcohol use: Yes     Comment: occ    Drug use: Not on file    Sexual activity: Not on file   Other Topics Concern    Not on file   Social History Narrative    Not on file     Social Determinants of Health     Financial Resource Strain: Not on file   Food Insecurity: Not on file   Transportation Needs: Not on file   Physical Activity: Not on file   Stress: Not on file   Social Connections: Not on file   Intimate Partner Violence: Not on file   Housing Stability: Not on file           Medical Hx: labs, MARS, medications, etc were reviewed. Only those findings of potential interest to psychiatry are noted below:      Past Medical History:   Diagnosis Date    Amphetamine abuse ([facility]) 9/16/2023    Anxiety     Bipolar affect, depressed ([facility])     Intentional overdose ([facility]) 9/16/2023       Allergies:   No Known Allergies    Medications (currently prescribed at [facility]):  Current Facility-Administered Medications   Medication Dose Route Frequency Provider Last Rate Last Admin    haloperidol (Haldol) tablet 3 mg  3 mg Oral QHS Gigi Barrera M.D.         [START ON 9/19/2023] haloperidol (Haldol) tablet 3 mg  3 mg Oral DAILY Gigi Barrera M.D.        nicotine (Nicoderm) 14 MG/24HR 14 mg  1 Patch Transdermal Daily-0600 Gigi Barrera M.D.   14 mg at 09/18/23 1402    dexmedetomidine (PRECEDEX) 400 mcg/100mL NS premix infusion  0.1-1.5 mcg/kg/hr (Ideal) Intravenous Continuous RAO CanO. 28.2 mL/hr at 09/18/23 1055 1.5 mcg/kg/hr at 09/18/23 1055    enoxaparin (Lovenox) inj 40 mg  40 mg Subcutaneous DAILY AT 1800 RAO CanO.   40 mg at 09/17/23 1804    labetalol (Normodyne/Trandate) injection 10 mg  10 mg Intravenous Q4HRS PRN RAO CanO.        ondansetron (Zofran) syringe/vial injection 4 mg  4 mg Intravenous Q4HRS PRN RAO CanO.        ondansetron (Zofran ODT) dispertab 4 mg  4 mg Oral Q4HRS PRN RAO CanO.        promethazine (Phenergan) tablet 12.5-25 mg  12.5-25 mg Oral Q4HRS PRN OPAL Can.O.        promethazine (Phenergan) suppository 12.5-25 mg  12.5-25 mg Rectal Q4HRS PRN RAO CanO.        prochlorperazine (Compazine) injection 5-10 mg  5-10 mg Intravenous Q4HRS PRN OPAL Can.O.        lactated ringers infusion   Intravenous Continuous Ana Laura Hernández M.D. 100 mL/hr at 09/18/23 1155 Rate Change at 09/18/23 1155    diphenhydrAMINE (Benadryl) injection 50 mg  50 mg Intravenous Q6HRS PRN OPAL Flores.O.   50 mg at 09/17/23 1831    LORazepam (Ativan) injection 2 mg  2 mg Intravenous Q6HRS PRN OPAL Flores.O.   2 mg at 09/17/23 1334       Labs  Recent Results (from the past 48 hour(s))   CREATINE KINASE    Collection Time: 09/16/23  2:45 PM   Result Value Ref Range    CPK Total 916 (H) 0 - 154 U/L   EKG    Collection Time: 09/16/23  6:33 PM   Result Value Ref Range    Report       [facility]    Test Date:  2023-09-16  Pt Name:    MARTY CHRISTIANSON             Department: [facility]  MRN:        [id number]                      Room:       [facility]  Gender:      Male                         Technician: [id number]  :        1985                   Requested By:TIMOTHY CARTER  Order #:    [id number]                    Reading MD: Lynn Cervantes    Measurements  Intervals                                Axis  Rate:       49                           P:          47  PA:         142                          QRS:        45  QRSD:       90                           T:          68  QT:         483  QTc:        437    Interpretive Statements  Sinus bradycardia, 49 bpm  Compared to ECG 2023 04:20:02  Rate is slower and does not meet criteria for left atrial enlargement  Electronically Signed On 2023 06:19:08 PDT by Lynn Cervantes     EKG    Collection Time: 23 10:46 PM   Result Value Ref Range    Report       [facility]    Test Date:  2023  Pt Name:    MARTY CHRISTIANSON             Department: [facility]  MRN:        [id number]                      Room:       [facility]  Gender:     Male                         Technician: [id number]  :        1985                   Requested By:TIMOTHY CARTER  Order #:    [id number]                    Reading MD: Lynn Cervantes    Measurements  Intervals                                Axis  Rate:       50                           P:          49  PA:         143                          QRS:        68  QRSD:       90                           T:          72  QT:         492  QTc:        449    Interpretive Statements  Sinus bradycardia, 50 bpm  Compared to ECG 2023 18:33:05  No significant changes  Electronically Signed On 2023 06:33:22 PDT by Lynn Cervantes     CBC without Differential    Collection Time: 23  2:50 AM   Result Value Ref Range    WBC 7.3 4.8 - 10.8 K/uL    RBC 4.04 (L) 4.70 - 6.10 M/uL    Hemoglobin 13.3 (L) 14.0 - 18.0 g/dL    Hematocrit 39.9 (L) 42.0 - 52.0 %    MCV 98.8 (H) 81.4 - 97.8 fL    MCH 32.9 27.0 - 33.0 pg    MCHC 33.3 32.3 - 36.5 g/dL    RDW 45.0 35.9 - 50.0 fL    Platelet Count 256 164 - 446  K/uL    MPV 9.1 9.0 - 12.9 fL   Basic Metabolic Panel (BMP)    Collection Time: 23  2:50 AM   Result Value Ref Range    Sodium 139 135 - 145 mmol/L    Potassium 4.4 3.6 - 5.5 mmol/L    Chloride 110 96 - 112 mmol/L    Co2 17 (L) 20 - 33 mmol/L    Glucose 99 65 - 99 mg/dL    Bun 17 8 - 22 mg/dL    Creatinine 1.00 0.50 - 1.40 mg/dL    Calcium 8.1 (L) 8.4 - 10.2 mg/dL    Anion Gap 12.0 7.0 - 16.0   ESTIMATED GFR    Collection Time: 23  2:50 AM   Result Value Ref Range    GFR (CKD-EPI) 100 >60 mL/min/1.73 m 2   EKG    Collection Time: 23  3:23 AM   Result Value Ref Range    Report       [facility]    Test Date:  2023  Pt Name:    MARTY CHRISTIANSON             Department: ICU  MRN:        [id number]                      Room:       [facility]  Gender:     Male                         Technician: [id number]  :        1985                   Requested By:TIMOTHY CARTER  Order #:    [id number]                    Reading MD: Lynn Cervantes    Measurements  Intervals                                Axis  Rate:       53                           P:          57  NC:         139                          QRS:        64  QRSD:       83                           T:          64  QT:         467  QTc:        439    Interpretive Statements  Sinus rhythm, 53 bpm  Compared to ECG 2023 22:46:04  No significant changes  Electronically Signed On 2023 06:40:52 PDT by Lynn Cervantes     EKG    Collection Time: 23 11:22 AM   Result Value Ref Range    Report       [facility]    Test Date:  2023  Pt Name:    MARTY CHRISTIANSON             Department: ICU  MRN:        [id number]                      Room:       [facility]  Gender:     Male                         Technician: [id number]  :        1985                   Requested By:TIMOTHY CARTER  Order #:    [id number]                    Reading MD: Kiana Velez MD    Measurements  Intervals                                Axis  Rate:       50                            P:          35  WA:         134                          QRS:        45  QRSD:       87                           T:          41  QT:         463  QTc:        423    Interpretive Statements  Incomplete analysis due to missing data in precordial lead(s)  Sinus bradycardia  Missing lead(s): V6  Compared to ECG 09/17/2023 03:23:15  Sinus rhythm no longer present  Electronically Signed On 09- 07:21:57 PDT by Kiana Velez MD     CREATINE KINASE    Collection Time: 09/18/23 10:05 AM   Result Value Ref Range    CPK Total 171 (H) 0 - 154 U/L   Comp Metabolic Panel    Collection Time: 09/18/23 10:05 AM   Result Value Ref Range    Sodium 139 135 - 145 mmol/L    Potassium 3.7 3.6 - 5.5 mmol/L    Chloride 107 96 - 112 mmol/L    Co2 21 20 - 33 mmol/L    Anion Gap 11.0 7.0 - 16.0    Glucose 106 (H) 65 - 99 mg/dL    Bun 11 8 - 22 mg/dL    Creatinine 1.02 0.50 - 1.40 mg/dL    Calcium 8.1 (L) 8.4 - 10.2 mg/dL    Correct Calcium 9.3 8.5 - 10.5 mg/dL    AST(SGOT) 21 12 - 45 U/L    ALT(SGPT) 19 2 - 50 U/L    Alkaline Phosphatase 61 30 - 99 U/L    Total Bilirubin <0.2 0.1 - 1.5 mg/dL    Albumin 2.5 (L) 3.2 - 4.9 g/dL    Total Protein 4.6 (L) 6.0 - 8.2 g/dL    Globulin 2.1 1.9 - 3.5 g/dL    A-G Ratio 1.2 g/dL   ESTIMATED GFR    Collection Time: 09/18/23 10:05 AM   Result Value Ref Range    GFR (CKD-EPI) 97 >60 mL/min/1.73 m 2       Cranial Imaging: reviewed  No orders to display         *ASSESSMENT/RECOMENDATIONS:  Schizoaffective disorder  Methamphetamine use disorder  THC use disorder      Medical:    -none acutely        Recommendations:  Legal Status:  extended  Observation status:   -line of site with sitter   Visitors:  family can visit if patient is stable and not making threats  Personal belongings: no     Discussed/voalted: Dr. Adan     Medication and Other Recommendations: final orders as per Tx Tm  Haldol taper in progress, haldol 3 mg BID today only  Haldol 3 mg qAM starting tomorrow. Return to  BID if patient becomes aggressive  May consider haldol 5 mg PRN for severe agitation if exacerbated after taper begins  Lithium lab ordered. Plan to start lithium tomorrow night as below if he has been taking it.  Will start home regimen tomorrow pending lithium lab showing compliance  Lithium 450 mg BID, first dose 9/19 qPM  Quetiapine 50 mg qHS, first dose 9/19  Hydroxyzine 50 mg BID PRN for agitation  Maintain current precedex as ordered. Plan to start taper after resuming home regimen if agitation/threatening behavior does not recur.   Utilize lorazepam 2 mg q6hr PRN for agitation  Will continue to follow with you.    Thank you for the consult.         Discharge recommendations: TBD      If released from [facility]: Discharge Instructions:  -Reviewed safety plan: 911, ER, PCM, MHC, Suicide crisis line  -Please assist with outpatient Psychiatric/substance use follow up appointments at discharge once medically cleared.

## 2023-09-18 NOTE — CARE PLAN
The patient is Stable - Low risk of patient condition declining or worsening    Shift Goals  Clinical Goals: RASS -1 to +1, wean precedex as able, transition to soft restraints, no violent behavior, monitor labs, safety  Patient Goals: Water, food  Family Goals: RUPERT    Progress made toward(s) clinical / shift goals:  Patient is alert at times, sleeping frequently. Oriented to self only, but has brief moments of clarity. Poor situational awarness, poor safety awareness, impulsive, verbally aggressive. Not violent. No attempts to harm staff. Tolerating RA. NSR / Sinus Dany. Tolerating reg diet. BM x 2 today. Continent. Perez in place. PIV x 2. Precedex at 1.5 mcg/kg/hr. Psychiatry eval today. [place] level sent to lab. Haldol changed to tablet. Nightly + daily. Inpatient psych admission recommended once free from restraint and medically cleared. Legal hold extended. Q15 monitoring needed, safety sitter needed.     Patient is not progressing towards the following goals:

## 2023-09-19 PROCEDURE — 700102 HCHG RX REV CODE 250 W/ 637 OVERRIDE(OP): Performed by: STUDENT IN AN ORGANIZED HEALTH CARE EDUCATION/TRAINING PROGRAM

## 2023-09-19 PROCEDURE — 770001 HCHG ROOM/CARE - MED/SURG/GYN PRIV*

## 2023-09-19 PROCEDURE — 700111 HCHG RX REV CODE 636 W/ 250 OVERRIDE (IP): Mod: JZ | Performed by: INTERNAL MEDICINE

## 2023-09-19 PROCEDURE — A9270 NON-COVERED ITEM OR SERVICE: HCPCS | Performed by: INTERNAL MEDICINE

## 2023-09-19 PROCEDURE — 700102 HCHG RX REV CODE 250 W/ 637 OVERRIDE(OP): Performed by: INTERNAL MEDICINE

## 2023-09-19 PROCEDURE — 94760 N-INVAS EAR/PLS OXIMETRY 1: CPT

## 2023-09-19 PROCEDURE — A9270 NON-COVERED ITEM OR SERVICE: HCPCS | Performed by: STUDENT IN AN ORGANIZED HEALTH CARE EDUCATION/TRAINING PROGRAM

## 2023-09-19 PROCEDURE — 700101 HCHG RX REV CODE 250: Performed by: INTERNAL MEDICINE

## 2023-09-19 RX ORDER — QUETIAPINE FUMARATE 25 MG/1
50 TABLET, FILM COATED ORAL NIGHTLY
Status: DISCONTINUED | OUTPATIENT
Start: 2023-09-19 | End: 2023-09-20

## 2023-09-19 RX ORDER — LITHIUM CARBONATE 450 MG
450 TABLET, EXTENDED RELEASE ORAL EVERY 12 HOURS
Status: DISCONTINUED | OUTPATIENT
Start: 2023-09-19 | End: 2023-09-25

## 2023-09-19 RX ADMIN — LITHIUM CARBONATE 450 MG: 450 TABLET, EXTENDED RELEASE ORAL at 17:20

## 2023-09-19 RX ADMIN — NICOTINE 14 MG: 14 PATCH TRANSDERMAL at 05:37

## 2023-09-19 RX ADMIN — DIPHENHYDRAMINE HYDROCHLORIDE 50 MG: 50 INJECTION, SOLUTION INTRAMUSCULAR; INTRAVENOUS at 01:56

## 2023-09-19 RX ADMIN — QUETIAPINE FUMARATE 50 MG: 25 TABLET ORAL at 20:07

## 2023-09-19 RX ADMIN — LITHIUM CARBONATE 450 MG: 450 TABLET, EXTENDED RELEASE ORAL at 13:24

## 2023-09-19 RX ADMIN — HALOPERIDOL 3 MG: 1 TABLET ORAL at 05:37

## 2023-09-19 RX ADMIN — LORAZEPAM 2 MG: 2 INJECTION INTRAMUSCULAR; INTRAVENOUS at 22:11

## 2023-09-19 RX ADMIN — DEXMEDETOMIDINE HYDROCHLORIDE 0.7 MCG/KG/HR: 400 INJECTION INTRAVENOUS at 03:09

## 2023-09-19 RX ADMIN — DIPHENHYDRAMINE HYDROCHLORIDE 50 MG: 50 INJECTION, SOLUTION INTRAMUSCULAR; INTRAVENOUS at 20:07

## 2023-09-19 RX ADMIN — LORAZEPAM 2 MG: 2 INJECTION INTRAMUSCULAR; INTRAVENOUS at 14:05

## 2023-09-19 RX ADMIN — DIPHENHYDRAMINE HYDROCHLORIDE 50 MG: 50 INJECTION, SOLUTION INTRAMUSCULAR; INTRAVENOUS at 09:28

## 2023-09-19 RX ADMIN — ENOXAPARIN SODIUM 40 MG: 100 INJECTION SUBCUTANEOUS at 17:20

## 2023-09-19 ASSESSMENT — PAIN DESCRIPTION - PAIN TYPE
TYPE: ACUTE PAIN

## 2023-09-19 NOTE — CARE PLAN
The patient is Watcher - Medium risk of patient condition declining or worsening    Shift Goals  Clinical Goals: RASS -1to +1, hemodynamic stability, improved behavior, titrate dex as pt tolerates  Patient Goals: get some sleep  Family Goals: jaden    Progress made toward(s) clinical / shift goals:    Problem: Knowledge Deficit - Standard  Goal: Patient and family/care givers will demonstrate understanding of plan of care, disease process/condition, diagnostic tests and medications  Description: Target End Date:  1-3 days or as soon as patient condition allows    Document in Patient Education    1.  Patient and family/caregiver oriented to unit, equipment, visitation policy and means for communicating concern  2.  Complete/review Learning Assessment  3.  Assess knowledge level of disease process/condition, treatment plan, diagnostic tests and medications  4.  Explain disease process/condition, treatment plan, diagnostic tests and medications  Outcome: Progressing     Problem: Skin Integrity  Goal: Skin integrity is maintained or improved  Description: Target End Date:  Prior to discharge or change in level of care    Document interventions on Skin Risk/Bo flowsheet groups and corresponding LDA    1.  Assess and monitor skin integrity, appearance and/or temperature  2.  Assess risk factors for impaired skin integrity and/or pressures ulcers  3.  Implement precautions to protect skin integrity in collaboration with interdisciplinary team  4.  Implement pressure ulcer prevention protocol if at risk for skin breakdown  5.  Confirm wound care consult if at risk for skin breakdown  6.  Ensure patient use of pressure relieving devices  (Low air loss bed, waffle overlay, heel protectors, ROHO cushion, etc)  Outcome: Progressing     Problem: Fall Risk  Goal: Patient will remain free from falls  Description: Target End Date:  Prior to discharge or change in level of care    Document interventions on the Corado Young Fall  Risk Assessment    1.  Assess for fall risk factors  2.  Implement fall precautions  Outcome: Progressing     Problem: Safety - Medical Restraint  Goal: Remains free of injury from restraints (Restraint for Interference with Medical Device)  Description: INTERVENTIONS:  1. Determine that other, less restrictive measures have been tried or would not be effective before applying the restraint  2. Evaluate the patient's condition at the time of restraint application  3. Educate patient/family regarding the reason for restraint  4. Q2H: Monitor safety, psychosocial status, comfort, circulation, respiratory status, LOC, nutrition and hydration  Outcome: Progressing  Goal: Free from restraint(s) (Restraint for Interference with Medical Device)  Description: INTERVENTIONS:  1.  ONCE/SHIFT or MINIMUM Q12H: Assess and document the continuing need for restraints  2.  Q24H: Continued use of restraint requires LIP to perform face to face examination and written order  3.  Identify and implement measures to help patient regain control  4.  Educate patient/family on discontinuation criteria   5.  Assess patient's understanding and retention of education provided  6.  Assess readiness for release & initiate progressive release per protocol  7.  Identify and document criteria for restraints  Outcome: Progressing     Problem: Pain - Standard  Goal: Alleviation of pain or a reduction in pain to the patient’s comfort goal  Description: Target End Date:  Prior to discharge or change in level of care    Document on Vitals flowsheet    1.  Document pain using the appropriate pain scale per order or unit policy  2.  Educate and implement non-pharmacologic comfort measures (i.e. relaxation, distraction, massage, cold/heat therapy, etc.)  3.  Pain management medications as ordered  4.  Reassess pain after pain med administration per policy  5.  If opiods administered assess patient's response to pain medication is appropriate per POSS  sedation scale  6.  Follow pain management plan developed in collaboration with patient and interdisciplinary team (including palliative care or pain specialists if applicable)  Outcome: Progressing     Problem: Hemodynamics  Goal: Patient's hemodynamics, fluid balance and neurologic status will be stable or improve  Description: Target End Date:  Prior to discharge or change in level of care    Document on Assessment and I/O flowsheet templates    1.  Monitor vital signs, pulse oximetry and cardiac monitor per provider order and/or policy  2.  Maintain blood pressure per provider order  3.  Hemodynamic monitoring per provider order  4.  Manage IV fluids and IV infusions  5.  Monitor intake and output  6.  Daily weights per unit policy or provider order  7.  Assess peripheral pulses and capillary refill  8.  Assess color and body temperature  9.  Position patient for maximum circulation/cardiac output  10. Monitor for signs/symptoms of excessive bleeding  11. Assess mental status, restlessness and changes in level of consciousness  12. Monitor temperature and report fever or hypothermia to provider immediately. Consideration of targeted temperature management.  Outcome: Progressing       Patient is not progressing towards the following goals:

## 2023-09-19 NOTE — PROGRESS NOTES
12-hour chart check complete by SCOTTIE Chin    Monitor Summary  Rhythm: SB/SR  Rate: 50s-70s  Ectopy: no ectopy  Measurements: .12/.06/.40

## 2023-09-19 NOTE — CARE PLAN
Problem: Fall Risk  Goal: Patient will remain free from falls  Outcome: Progressing     Problem: Safety - Medical Restraint  Goal: Free from restraint(s) (Restraint for Interference with Medical Device)  Outcome: Met   The patient is Stable - Low risk of patient condition declining or worsening    Shift Goals  Clinical Goals: monitor agitation  Patient Goals: get some sleep  Family Goals: jaden    Progress made toward(s) clinical / shift goals:  Pt has remained out of restraints, pt is redirectable, precedex was weaned off. Psych was rounding and resumed his medications and extended his legal hold.     Patient is not progressing towards the following goals:

## 2023-09-19 NOTE — PROGRESS NOTES
12-hour chart check complete.    Monitor Summary  Rhythm: NSR / Sinus Dany  Rate: 55 - 80  Ectopy: none  Measurements: 0.14 / 0.08 / 0.42

## 2023-09-19 NOTE — PROGRESS NOTES
Pt arrived to floor from ICU. Assumed care of patient. Alert and oriented, on RA in no acute respiratory distress. Pt resting comfortably, able to make needs known. Pt denies complaints or concerns at this time. Sitter at bedside. Call light and belongings within reach. Hourly rounding in place.

## 2023-09-19 NOTE — CONSULTS
Behavioral Health [facility]  PSYCHIATRIC CONSULTATION - Follow-up  Established Patient    DOS: 09/19/23     Reason for Admission:   reported taking 15-20 pills of seroquel. Was combative. He had a prescription bottle with him that was filled 3 days ago that was empty per report.  Police were called as the patient was reported as trespassing and when he was in the police car patient started screaming that he was suicidal and that he overdosed on Seroquel and EMS was called and the patient requested to go to a different facility other than [facility]. The most he could have taken was around 1,250 mg.    Legal Hold Status: on legal hold - court    CC:   Chief Complaint   Patient presents with    ALOC           Drug Overdose    Legal 2000     Patient BIB EMS with legal hold initiated by [employer]. Patient reports taking 15-20 pills of Seraquil. Patient combative in triage, arrived in four point restraints by EMS.                S:   Observed in bed, awake, reading, talking loudly to self. Explains reason for hospitalization was he found a dead body in a box on the [facility], tangential thought process, unable to provide linear timeline for hospitalization; reports he overdosed because seeing a dead body was distressing and be became suicidal. Admits Hx of AVH, denies this being a hallucinations, states it was the body of his sister. Reports improved sleep states was not sleeping much prior to hospitalization; no change to energy, presents as hypo-manic, hyperverbal, pressured speech, flight of ideas, did not observe any impulsive actions, redirectable. Denies SI/HI, A/VH, appears to be responding to internal stimulation, often asking questions, overheard responding to self during encounter. Denies GI distress, HA, dizziness, appears to be tolerating medications. Continues to present an acute danger to self d/t ceci, would benefit from transition to  hospital for stabilization.     Encouraged to reach out to  family about his sister. Has awareness of medications Lithium, Seroquel, restart medications tonight.      O:   Medical ROS (as pertinent):   Recent Labs     23  0250   WBC 7.3   RBC 4.04*   HEMOGLOBIN 13.3*   HEMATOCRIT 39.9*   MCV 98.8*   MCH 32.9   RDW 45.0   PLATELETCT 256   MPV 9.1     Recent Labs     23  1445 23  0250 23  1005   SODIUM  --  139 139   POTASSIUM  --  4.4 3.7   CHLORIDE  --  110 107   CO2  --  17* 21   GLUCOSE  --  99 106*   BUN  --  17 11   CPKTOTAL 916*  --  171*     Recent Labs     23  0250 23  1005   ALBUMIN  --  2.5*   TBILIRUBIN  --  <0.2   ALKPHOSPHAT  --  61   TOTPROTEIN  --  4.6*   ALTSGPT  --  19   ASTSGOT  --  21   CREATININE 1.00 1.02       EKG:   Results for orders placed or performed during the hospital encounter of 09/15/23   EKG (NOW)   Result Value Ref Range    Report       [facility]    Test Date:  2023-09-15  Pt Name:    MARTY CHRISTIANSON             Department: [facility]  MRN:        [id number]                      [facility]  Gender:     Male                         Technician: [id number]  :        1985                   Requested By:FELICITY RUIZ  Order #:    [id number]                    Reading MD: Felicity Ruiz    Measurements  Intervals                                Axis  Rate:       104                          P:          55  NY:         138                          QRS:        57  QRSD:       87                           T:          41  QT:         338  QTc:        445    Interpretive Statements  Sinus tachycardia  Borderline low voltage, extremity leads  Baseline wander in lead(s) I,II,aVR,V1,V2,V3,V6  No previous ECG available for comparison  Electronically Signed On 2023 00:00:19 PDT by Felicity Ruiz     EKG (NOW)   Result Value Ref Range    Report       [facility]    Test Date:  2023  Pt Name:    MARTY CHRISTIANSON              Department: EDS  MRN:        [id number]                      Room:       -ROOM 6  Gender:     Male                         Technician: [id number]  :        1985                   Requested By:FELICITY RUIZ  Order #:    [id number]                    Reading MD: Felicity Ruiz    Measurements  Intervals                                Axis  Rate:       94                           P:          66  CO:         141                          QRS:        56  QRSD:       79                           T:          66  QT:         361  QTc:        452    Interpretive Statements  Sinus rhythm  Probable left atrial enlargement  Baseline wander in lead(s) V2  Compared to ECG 09/15/2023 23:52:48  Sinus tachycardia no longer present  Electronically Signed On 2023 04:35:24 PDT by Felicity Ruiz     EKG   Result Value Ref Range    Report       [facility]    Test Date:  2023  Pt Name:    MARTY CHRISTIANSON             Department: EDS  MRN:        [id number]                      Room:       [facility]  Gender:     Male                         Technician: [id number]  :        1985                   Requested By:TIMOTHY ACRTER  Order #:    [id number]                    Reading MD: Lynn Cervantes    Measurements  Intervals                                Axis  Rate:       49                           P:          47  CO:         142                          QRS:        45  QRSD:       90                           T:          68  QT:         483  QTc:        437    Interpretive Statements  Sinus bradycardia, 49 bpm  Compared to ECG 2023 04:20:02  Rate is slower and does not meet criteria for left atrial enlargement  Electronically Signed On 2023 06:19:08 PDT by Lynn Cervantes     EKG   Result Value Ref Range    Report       [facility]    Test Date:  2023  Pt Name:    MARTY CHRISTIANSON             Department: EDS  MRN:        [id number]                      Room:       [facility]  Gender:     Male                          Technician: [id number]  :        1985                   Requested By:TIMOTHY CARTER  Order #:    [id number]                    Reading MD: Lynn Cervantes    Measurements  Intervals                                Axis  Rate:       50                           P:          49  CA:         143                          QRS:        68  QRSD:       90                           T:          72  QT:         492  QTc:        449    Interpretive Statements  Sinus bradycardia, 50 bpm  Compared to ECG 2023 18:33:05  No significant changes  Electronically Signed On 2023 06:33:22 PDT by Lynn Cervantes     EKG   Result Value Ref Range    Report       [facility]    Test Date:  2023  Pt Name:    MARTY CHRISTIANSON             Department: ICU  MRN:        [id number]                      Room:       [facility]  Gender:     Male                         Technician: [id number]  :        1985                   Requested By:TIMOTHY CARTER  Order #:    [id number]                    Reading MD: Lynn Cervantes    Measurements  Intervals                                Axis  Rate:       53                           P:          57  CA:         139                          QRS:        64  QRSD:       83                           T:          64  QT:         467  QTc:        439    Interpretive Statements  Sinus rhythm, 53 bpm  Compared to ECG 2023 22:46:04  No significant changes  Electronically Signed On 2023 06:40:52 PDT by Lynn Cervantes     EKG   Result Value Ref Range    Report       [facility]    Test Date:  2023  Pt Name:    MARTY CHRISTIANSON             Department: ICU  MRN:        [id number]                      Room:       332  Gender:     Male                         Technician: [id number]  :        1985                   Requested By:TIMOTHY CARTER  Order #:    [id number]                    Reading MD: Kiana Velez MD    Measurements  Intervals                                 "Axis  Rate:       50                           P:          35  CT:         134                          QRS:        45  QRSD:       87                           T:          41  QT:         463  QTc:        423    Interpretive Statements  Incomplete analysis due to missing data in precordial lead(s)  Sinus bradycardia  Missing lead(s): V6  Compared to ECG 09/17/2023 03:23:15  Sinus rhythm no longer present  Electronically Signed On 09- 07:21:57 PDT by Kiana Velez MD          MSE:   /68   Pulse 74   Temp 36.7 °C (98 °F) (Temporal)   Resp 18   Ht 1.803 m (5' 11\")   Wt 97.5 kg (214 lb 15.2 oz)   SpO2 97%     Constitutional: as noted above  General Appearance/Behavior: 38 y.o. appears obese intermittent eye contact cooperative, Poor impulse control  Abnormal Movements: none, no PMA/PMR or tremor observed.  Gait and Posture: not observed  Musculoskeletal: as noted above  Mood: \"good\"  Affect: Inappropriate   Speech: pressured  Language:  spontaneous, comprehends spoken commands, and fluent   Thought Process: Tangential and Flight of ideas, Future Oriented  Thought Content: Denies SI/HI, A/VH. Observed responding to IS  Insight/Judgement:  unable to assess  Alert/Orientation: alert, only oriented to:, person, place  Attn/Concentration: short attention span  MMSE: deferred this visit     Medications:  Scheduled Medications   Medication Dose Frequency    haloperidol  3 mg DAILY    nicotine  1 Patch Daily-0600    enoxaparin (LOVENOX) injection  40 mg DAILY AT 1800       Allergies:   No Known Allergies     Assessment:   Diagnosis:   1. Intentional overdose, initial encounter (HCC) Acute   2. Psychosis, unspecified psychosis type (HCC) Acute   3. Polysubstance abuse (HCC) Acute   4. Suicidal ideation Acute   5. Non-compliance Acute   6. Methamphetamine abuse (HCC) Acute        Psychiatric:   Stimulant use disorder - meth  Schizoaffective disorder  Cannabis use disorder     Medical: as noted by the " medical treatment team.   Encephalopathy - resolving     Recommendations:  Legal Status: on legal hold - court    Please transfer patient to inpatient psychiatric hospital when medically cleared and bed is available.    Observation status:   - Line of site with sitter    Phone: Yes - Okay to use at nursing availability/discretion.   Visitors: No   Personal belongings: No     Discussed/voalted: Eve BOYKIN, JAVED Hernández MD    Medication Recommendations: Final orders as per Treatment Team  Start Lithium 450mg PO BID for mood, Seroquel 50mg PO QHS for psychosis  Risks/benefits/side effects discussed, patient verbalized understanding.  Medication reconciliation was completed.    Reviewed safety plan: 911, ER, PCM, MHC, suicide crisis line, nursing staff while inpatient.    Will Continue to Follow. Thank you for the consult.

## 2023-09-19 NOTE — PROGRESS NOTES
4 Eyes Skin Assessment Completed by ASHUTOSH Munoz and ASHUTOSH Rockwell.    Head WDL  Ears WDL  Nose WDL  Mouth WDL  Neck WDL  Breast/Chest WDL  Shoulder Blades WDL  Spine Redness/rashes  (R) Arm/Elbow/Hand Bruising  (L) Arm/Elbow/Hand Bruising  Abdomen WDL  Groin WDL  Scrotum/Coccyx/Buttocks WDL  (R) Leg scab and scars  (L) Leg scab and scars  (R) Heel/Foot/Toe Scab  (L) Heel/Foot/Toe Scab          Devices In Places NA      Interventions In Place Pillows    Possible Skin Injury No    Pictures Uploaded Into Epic N/A  Wound Consult Placed N/A  RN Wound Prevention Protocol Ordered No

## 2023-09-20 PROBLEM — F31.9 BIPOLAR AFFECTIVE (HCC): Status: ACTIVE | Noted: 2023-09-20

## 2023-09-20 PROBLEM — G92.9 TOXIC ENCEPHALOPATHY: Status: ACTIVE | Noted: 2023-09-20

## 2023-09-20 PROBLEM — M62.82 RHABDOMYOLYSIS: Status: ACTIVE | Noted: 2023-09-16

## 2023-09-20 PROCEDURE — 700102 HCHG RX REV CODE 250 W/ 637 OVERRIDE(OP): Performed by: INTERNAL MEDICINE

## 2023-09-20 PROCEDURE — 94760 N-INVAS EAR/PLS OXIMETRY 1: CPT

## 2023-09-20 PROCEDURE — A9270 NON-COVERED ITEM OR SERVICE: HCPCS | Performed by: INTERNAL MEDICINE

## 2023-09-20 PROCEDURE — A9270 NON-COVERED ITEM OR SERVICE: HCPCS | Performed by: STUDENT IN AN ORGANIZED HEALTH CARE EDUCATION/TRAINING PROGRAM

## 2023-09-20 PROCEDURE — 700102 HCHG RX REV CODE 250 W/ 637 OVERRIDE(OP): Performed by: HOSPITALIST

## 2023-09-20 PROCEDURE — 700111 HCHG RX REV CODE 636 W/ 250 OVERRIDE (IP): Mod: JZ | Performed by: INTERNAL MEDICINE

## 2023-09-20 PROCEDURE — A9270 NON-COVERED ITEM OR SERVICE: HCPCS

## 2023-09-20 PROCEDURE — 770001 HCHG ROOM/CARE - MED/SURG/GYN PRIV*

## 2023-09-20 PROCEDURE — 700102 HCHG RX REV CODE 250 W/ 637 OVERRIDE(OP): Performed by: STUDENT IN AN ORGANIZED HEALTH CARE EDUCATION/TRAINING PROGRAM

## 2023-09-20 PROCEDURE — 99233 SBSQ HOSP IP/OBS HIGH 50: CPT | Performed by: INTERNAL MEDICINE

## 2023-09-20 PROCEDURE — 700102 HCHG RX REV CODE 250 W/ 637 OVERRIDE(OP)

## 2023-09-20 PROCEDURE — A9270 NON-COVERED ITEM OR SERVICE: HCPCS | Performed by: HOSPITALIST

## 2023-09-20 PROCEDURE — 700111 HCHG RX REV CODE 636 W/ 250 OVERRIDE (IP): Performed by: INTERNAL MEDICINE

## 2023-09-20 RX ORDER — LORAZEPAM 1 MG/1
2 TABLET ORAL EVERY 4 HOURS PRN
Status: DISCONTINUED | OUTPATIENT
Start: 2023-09-20 | End: 2023-09-24

## 2023-09-20 RX ORDER — LORAZEPAM 2 MG/ML
2 INJECTION INTRAMUSCULAR EVERY 4 HOURS PRN
Status: DISCONTINUED | OUTPATIENT
Start: 2023-09-20 | End: 2023-09-24

## 2023-09-20 RX ORDER — ACETAMINOPHEN 325 MG/1
650 TABLET ORAL EVERY 4 HOURS PRN
Status: DISCONTINUED | OUTPATIENT
Start: 2023-09-20 | End: 2023-10-06 | Stop reason: HOSPADM

## 2023-09-20 RX ORDER — QUETIAPINE FUMARATE 100 MG/1
100 TABLET, FILM COATED ORAL NIGHTLY
Status: DISCONTINUED | OUTPATIENT
Start: 2023-09-20 | End: 2023-09-21

## 2023-09-20 RX ADMIN — LORAZEPAM 2 MG: 1 TABLET ORAL at 10:49

## 2023-09-20 RX ADMIN — LITHIUM CARBONATE 450 MG: 450 TABLET, EXTENDED RELEASE ORAL at 17:36

## 2023-09-20 RX ADMIN — QUETIAPINE FUMARATE 100 MG: 100 TABLET ORAL at 20:36

## 2023-09-20 RX ADMIN — LORAZEPAM 2 MG: 1 TABLET ORAL at 15:07

## 2023-09-20 RX ADMIN — LORAZEPAM 2 MG: 2 INJECTION INTRAMUSCULAR; INTRAVENOUS at 06:03

## 2023-09-20 RX ADMIN — ACETAMINOPHEN 650 MG: 325 TABLET ORAL at 15:23

## 2023-09-20 RX ADMIN — DIPHENHYDRAMINE HYDROCHLORIDE 50 MG: 50 INJECTION, SOLUTION INTRAMUSCULAR; INTRAVENOUS at 08:25

## 2023-09-20 RX ADMIN — LORAZEPAM 2 MG: 1 TABLET ORAL at 21:12

## 2023-09-20 RX ADMIN — ACETAMINOPHEN 650 MG: 325 TABLET ORAL at 06:03

## 2023-09-20 RX ADMIN — ACETAMINOPHEN 650 MG: 325 TABLET ORAL at 20:36

## 2023-09-20 RX ADMIN — LITHIUM CARBONATE 450 MG: 450 TABLET, EXTENDED RELEASE ORAL at 06:03

## 2023-09-20 RX ADMIN — ENOXAPARIN SODIUM 40 MG: 100 INJECTION SUBCUTANEOUS at 17:35

## 2023-09-20 RX ADMIN — NICOTINE 14 MG: 14 PATCH TRANSDERMAL at 08:19

## 2023-09-20 ASSESSMENT — COGNITIVE AND FUNCTIONAL STATUS - GENERAL
DAILY ACTIVITIY SCORE: 24
SUGGESTED CMS G CODE MODIFIER DAILY ACTIVITY: CH
MOBILITY SCORE: 24
SUGGESTED CMS G CODE MODIFIER MOBILITY: CH

## 2023-09-20 ASSESSMENT — PAIN DESCRIPTION - PAIN TYPE
TYPE: ACUTE PAIN
TYPE: ACUTE PAIN

## 2023-09-20 NOTE — ASSESSMENT & PLAN NOTE
I spoke with psychiatrist, medications adjusted.  -We will continue lithium 600 mg BID dose- level is therapeutic    - Continue Valium as needed for anxiety, and Geodon as needed for severe agitation  Haldol was discontinued  Continue legal hold for now until he is determined safe for discharge  -As per psychiatry note, patient is at goal with lithium levels, it is a potential patient may end up discharging as there are no beds available at [facility].  -Today he was agitated and irritable when told he could not discharge home - mood remains labile and I don't feel he is safe to DC home

## 2023-09-20 NOTE — PROGRESS NOTES
Hospital Medicine Daily Progress Note    Date of Service  9/20/2023    Chief Complaint  AMS    Hospital Course  Peter Grace is a 38 y.o. male with anxiety, bipolar affect and depression, admitted 9/15/2023 with altered mentation after he shared suicidal ideation and intentionally overdosed on Seroquel.  He was presumed to have taken 1250 mg. Poison control was contacted who recommended serial EKGs.  He was severely agitated and combative and required four-point restraints.  He was started on Precedex drip, along with IV Ativan and IV Benadryl.  He had improvement in mental status and was able to be weaned off Precedex.  He was put on legal hold, and psychiatry was consulted.  He was started on lithium for mood, and on Seroquel for psychosis.    Interval Problem Update  9/20/2023 - I reviewed the patient's chart today. Uneventful night. VSS. Afebrile. Saturating well on RA.  No new labs today.  Last lithium level was 0.1.  Psychiatry has continued legal hold, awaiting court hearing.    > I have personally seen and examined the patient today.  He is awake, fully conversant, but has flight of ideas, circumstantial speech.  Denies any significant acute.  Pain no other complaints.  No nausea or vomiting.  Not short of breath.    I personally reviewed all lab results mentioned above. Prior medical records from this institution and outside facilities were independently reviewed as noted. I also personally reviewed all ER physician and consultant recommendations and plans as documented above. History was independently obtained by myself. I have discussed this patient's plan of care and discharge plan at IDT rounds today with Case Management, Nursing, Nursing leadership, and other members of the IDT team.    Consultants/Specialty  critical care and psychiatry    Code Status  Full Code    Disposition  The patient is not medically cleared for discharge to home or a post-acute facility.      On legal hold, awaiting  court hearing.  I have placed the appropriate orders for post-discharge needs.    Review of Systems  ROS     Pertinent positives/negatives as mentioned above.     A complete review of systems was personally done by me. All other systems were negative.       Physical Exam  Temp:  [36.7 °C (98.1 °F)-37 °C (98.6 °F)] 36.7 °C (98.1 °F)  Pulse:  [74-98] 98  Resp:  [16-21] 18  BP: (122-142)/(78-97) 139/83  SpO2:  [96 %-100 %] 96 %    Physical Exam  Vitals reviewed.   Constitutional:       General: He is not in acute distress.     Appearance: Normal appearance. He is not toxic-appearing or diaphoretic.   HENT:      Head: Normocephalic and atraumatic.      Right Ear: External ear normal.      Left Ear: External ear normal.      Mouth/Throat:      Mouth: Mucous membranes are moist.      Pharynx: No oropharyngeal exudate.   Eyes:      General: No scleral icterus.     Extraocular Movements: Extraocular movements intact.      Conjunctiva/sclera: Conjunctivae normal.      Pupils: Pupils are equal, round, and reactive to light.   Cardiovascular:      Rate and Rhythm: Normal rate and regular rhythm.      Heart sounds: Normal heart sounds. No murmur heard.     No gallop.   Pulmonary:      Effort: Pulmonary effort is normal. No respiratory distress.      Breath sounds: Normal breath sounds. No stridor. No wheezing, rhonchi or rales.   Chest:      Chest wall: No tenderness.   Abdominal:      General: Bowel sounds are normal. There is no distension.      Palpations: Abdomen is soft. There is no mass.      Tenderness: There is no abdominal tenderness. There is no guarding or rebound.   Musculoskeletal:         General: No swelling. Normal range of motion.      Cervical back: Normal range of motion and neck supple.      Right lower leg: No edema.      Left lower leg: No edema.   Lymphadenopathy:      Cervical: No cervical adenopathy.   Skin:     General: Skin is warm and dry.      Coloration: Skin is not jaundiced.      Findings: No  rash.   Neurological:      General: No focal deficit present.      Mental Status: He is alert and oriented to person, place, and time.      Cranial Nerves: No cranial nerve deficit.   Psychiatric:         Mood and Affect: Mood normal.         Behavior: Behavior normal.         Judgment: Judgment normal.      Comments: Flight of ideas  Circumstantial speech  No hallucinations         Fluids    Intake/Output Summary (Last 24 hours) at 9/20/2023 1146  Last data filed at 9/20/2023 0828  Gross per 24 hour   Intake 1440 ml   Output 1 ml   Net 1439 ml       Laboratory      Recent Labs     09/18/23  1005   SODIUM 139   POTASSIUM 3.7   CHLORIDE 107   CO2 21   GLUCOSE 106*   BUN 11   CREATININE 1.02   CALCIUM 8.1*                   Imaging  No orders to display        Assessment/Plan  * Toxic encephalopathy- (present on admission)  Assessment & Plan  - Due to intentional drug overdose in an apparent suicide attempt.  -Mentation has improved, suspect back to baseline.  Off Precedex drip.  -Continue to monitor.  Continue as needed IV/p.o. Ativan for agitation/anxiety.  Continue lithium for mood stabilization, and Seroquel for psychosis.    Intentional overdose (HCC)- (present on admission)  Assessment & Plan  - In an apparent suicide attempt.  -Continuing on legal hold per psychiatry, awaiting court hearing.  Suspect will need psychiatric placement.  -Cardiac status stable.  -Maintain on suicide/homicide/elopement precaution.  Continue one-on-one supervision.    Rhabdomyolysis- (present on admission)  Assessment & Plan  -Likely due to severe agitation.  CPK levels have improved.  Off IV fluids.  Creatinine has remained normal.    Leukocytosis- (present on admission)  Assessment & Plan  - Likely reactive.  Resolved.  No signs of infection.  Holding off on antibiotics.    Bipolar affective (HCC)- (present on admission)  Assessment & Plan  - Continue lithium for mood and Seroquel for psychosis.  -Psychiatry following.  -Will  likely need psych placement.    Amphetamine abuse (HCC)  Assessment & Plan  - Will need further counseling against further amphetamine use.         VTE prophylaxis:    enoxaparin ppx      Total time spent: 52 minutes

## 2023-09-20 NOTE — CARE PLAN
Problem: Psychosocial  Goal: Patient's level of anxiety will decrease  Description: Target End Date:  1-3 days or as soon as patient condition allows    1.  Collaborate with patient and family/caregiver to identify triggers and develop strategies to cope with anxiety  2.  Implement stimuli reduction, calming techniques  3.  Pharmacologic management per provider order  4.  Encourage patient/family/care giver participation  5.  Collaborate with interdisciplinary team including Psychologist or Behavioral Health Team as needed  Outcome: Progressing   The patient is Stable - Low risk of patient condition declining or worsening    Shift Goals  Clinical Goals: monitor for behaviors  Patient Goals: rest  Family Goals: NA    Progress made toward(s) clinical / shift goals: No noted behaviors through the shift. Sitter at the bedside, hourly monitoring in place.     Patient is not progressing towards the following goals:

## 2023-09-20 NOTE — CONSULTS
"[facility]  PSYCHIATRIC CONSULTATION - Follow-up  Established Patient    DOS: 09/20/23     Reason for Admission:   reported taking 15-20 pills of seroquel. Was combative. He had a prescription bottle with him that was filled 3 days ago that was empty per report.  Police were called as the patient was reported as trespassing and when he was in the police car patient started screaming that he was suicidal and that he overdosed on Seroquel and EMS was called and the patient requested to go to a different facility other than [facility]. The most he could have taken was around 1,250 mg.    Legal Hold Status: on legal hold - court    CC:   Chief Complaint   Patient presents with    ALOC           Drug Overdose    Legal 2000     Patient BIB EMS with legal hold initiated by [employer]. Patient reports taking 15-20 pills of Seraquil. Patient combative in triage, arrived in four point restraints by EMS.                S:   Observed in room, reports adequate sleep, elevated energy, appetite, improved mood, denies SI/HI, A/VH. Continues to endorse situation leading to hospitalization was r/t finding corpse, and experiencing distress r/t situation, states possible substance induced psychosis, states has happened before. Reports was accepted to [facility] for rehab and to maintain sobriety, did not make intake appointment. State he would return there or to [facility] for additional support with MIKE. Denies GI distress, HA, dizziness, appears to be tolerating medications. Not currently focused on Bx, including bizarre thinking, increased energy, hypomania. States olanzapine, Risperdal are not effective d/t side effects. Reports Seroquel most effective in the past to help increase sleep and reduce ceci.     O:   Medical ROS (as pertinent):   No results for input(s): \"WBC\", \"RBC\", \"HEMOGLOBIN\", \"HEMATOCRIT\", \"MCV\", \"MCH\", \"RDW\", \"PLATELETCT\", \"MPV\", \"NEUTSPOLYS\", \"LYMPHOCYTES\", \"MONOCYTES\", " "\"EOSINOPHILS\", \"BASOPHILS\", \"RBCMORPHOLO\" in the last 72 hours.  Recent Labs     23  1005   SODIUM 139   POTASSIUM 3.7   CHLORIDE 107   CO2 21   GLUCOSE 106*   BUN 11   CPKTOTAL 171*     Recent Labs     23  1005   ALBUMIN 2.5*   TBILIRUBIN <0.2   ALKPHOSPHAT 61   TOTPROTEIN 4.6*   ALTSGPT 19   ASTSGOT 21   CREATININE 1.02       EKG:   Results for orders placed or performed during the hospital encounter of 09/15/23   EKG (NOW)   Result Value Ref Range    Report       [facility] Emergency Dept.    Test Date:  2023-09-15  Pt Name:    MARTY CHRISTIANSON             Department: EDS  MRN:        [id number]                      Room:       [place] [address]  Gender:     Male                         Technician: [id number]  :        1985                   Requested By:FELICITY RUIZ  Order #:    [id number]                    Reading MD: Felicity Ruiz    Measurements  Intervals                                Axis  Rate:       104                          P:          55  NY:         138                          QRS:        57  QRSD:       87                           T:          41  QT:         338  QTc:        445    Interpretive Statements  Sinus tachycardia  Borderline low voltage, extremity leads  Baseline wander in lead(s) I,II,aVR,V1,V2,V3,V6  No previous ECG available for comparison  Electronically Signed On 2023 00:00:19 PDT by Felicity Ruiz     EKG (NOW)   Result Value Ref Range    Report       [facility] Emergency Dept.    Test Date:  2023  Pt Name:    MARTY CHRISTIANSON             Department: EDS  MRN:        [id number]                      Room:       [facility] 6  Gender:     Male                         Technician: [email or web address]  :        1985                   Requested By:FELICITY RUIZ  Order #:    [id number]                    Reading MD: Felicity Ruiz    Measurements  Intervals                                Axis  Rate:       94   "                         P:          66  CO:         141                          QRS:        56  QRSD:       79                           T:          66  QT:         361  QTc:        452    Interpretive Statements  Sinus rhythm  Probable left atrial enlargement  Baseline wander in lead(s) V2  Compared to ECG 09/15/2023 23:52:48  Sinus tachycardia no longer present  Electronically Signed On 2023 04:35:24 PDT by Geovanni Briseno     EKG   Result Value Ref Range    Report       [facility]    Test Date:  2023  Pt Name:    MARTY CHRISTIANSON             Department: [facility]  MRN:        [id number]                      Room:       [facility]  Gender:     Male                         Technician: [id number]  :        1985                   Requested By:TIMOTHY CARTER  Order #:    [id number]                    Reading MD: Lynn Cervantes    Measurements  Intervals                                Axis  Rate:       49                           P:          47  CO:         142                          QRS:        45  QRSD:       90                           T:          68  QT:         483  QTc:        437    Interpretive Statements  Sinus bradycardia, 49 bpm  Compared to ECG 2023 04:20:02  Rate is slower and does not meet criteria for left atrial enlargement  Electronically Signed On 2023 06:19:08 PDT by Lynn Cervantes     EKG   Result Value Ref Range    Report       [facility]    Test Date:  2023  Pt Name:    MARTY CHRISTIANSON             Department: EDS  MRN:        [id number]                      Room:       [facility]  Gender:     Male                         Technician: [id number]  :        1985                   Requested By:TIMOTHY CARTER  Order #:    [id number]                    Reading MD: Lynn Cervantes    Measurements  Intervals                                Axis  Rate:       50                           P:          49  CO:         143                          QRS:        68  QRSD:       90                            T:          72  QT:         492  QTc:        449    Interpretive Statements  Sinus bradycardia, 50 bpm  Compared to ECG 2023 18:33:05  No significant changes  Electronically Signed On 2023 06:33:22 PDT by Lynn Cervantes     EKG   Result Value Ref Range    Report       [facility]    Test Date:  2023  Pt Name:    MARTY CHRISTIANSON             Department: ICU  MRN:        [id number]                      Room:       [facility]  Gender:     Male                         Technician: [id number]  :        1985                   Requested By:TIMOTHY CARTER  Order #:    [id number]                    Reading MD: Lynn Cervantes    Measurements  Intervals                                Axis  Rate:       53                           P:          57  WY:         139                          QRS:        64  QRSD:       83                           T:          64  QT:         467  QTc:        439    Interpretive Statements  Sinus rhythm, 53 bpm  Compared to ECG 2023 22:46:04  No significant changes  Electronically Signed On 2023 06:40:52 PDT by Lynn Cervantes     EKG   Result Value Ref Range    Report       [facility]    Test Date:  2023  Pt Name:    MARTY CHRISTIANSON             Department: ICU  MRN:        [id number]                      Room:       [facility]  Gender:     Male                         Technician: [id number]  :        1985                   Requested By:TIMOTHY CARTER  Order #:    [id number]                    Reading MD: Kiana Velez MD    Measurements  Intervals                                Axis  Rate:       50                           P:          35  WY:         134                          QRS:        45  QRSD:       87                           T:          41  QT:         463  QTc:        423    Interpretive Statements  Incomplete analysis due to missing data in precordial lead(s)  Sinus bradycardia  Missing lead(s): V6  Compared to ECG 2023  "03:23:15  Sinus rhythm no longer present  Electronically Signed On 09- 07:21:57 PDT by Kiana Velez MD          MSE:   /83   Pulse 78   Temp 36.7 °C (98.1 °F) (Temporal)   Resp 20   Ht 1.803 m (5' 11\")   Wt 97.5 kg (214 lb 15.2 oz)   SpO2 97%     Constitutional: as noted above  General Appearance/Behavior: 38 y.o. appears muscular intermittent eye contact superficially cooperative, Poor impulse control  Abnormal Movements: hyperactive  Gait and Posture: within normal limits  Musculoskeletal: as noted above  Mood: \"good\"  Affect: Inappropriate   Speech: normal rate, normal rhythm, normal tone, normal volume, and normal fluency  Language:  spontaneous, comprehends spoken commands, and fluent   Thought Process: Tangential and Perseverative, Future Oriented  Thought Content: Denies SI/HI, A/VH  Insight/Judgement:  poor  Alert/Orientation: alert, oriented to person, place and time  Attn/Concentration: short attention span  MMSE: deferred this visit     Medications:  Scheduled Medications   Medication Dose Frequency    QUEtiapine  50 mg Nightly    lithium carbonate ER  450 mg Q12HRS    nicotine  1 Patch Daily-0600    enoxaparin (LOVENOX) injection  40 mg DAILY AT 1800       Allergies:   No Known Allergies     Assessment:   Diagnosis:   1. Intentional overdose, initial encounter (HCC) Acute   2. Psychosis, unspecified psychosis type (HCC) Acute   3. Polysubstance abuse (HCC) Acute   4. Suicidal ideation Acute   5. Non-compliance Acute   6. Methamphetamine abuse (HCC) Acute        Psychiatric:   Stimulant use disorder - meth  Schizoaffective disorder, bipolar type, current hypomania  Cannabis use disorder      Medical: as noted by the medical treatment team.   Encephalopathy - resolving     Recommendations:  Legal Status: on legal hold - court     Please transfer patient to inpatient psychiatric hospital when medically cleared and bed is available.     Observation status:   - Line of site with " sitter     Phone: Yes - Okay to use at nursing availability/discretion.   Visitors: No   Personal belongings: No     Discussed/voalted: JULIANO Rowland MD    Medication Recommendations: Final orders as per Treatment Team  Increase Seroquel to 100mg PO QHS for mood  Risks/benefits/side effects discussed, patient verbalized understanding.  Medication reconciliation was completed.    Reviewed safety plan: 911, ER, PCM, MHC, suicide crisis line, nursing staff while inpatient.    Will Continue to Follow. Thank you for the consult.

## 2023-09-20 NOTE — CARE PLAN
The patient is Stable - Low risk of patient condition declining or worsening    Shift Goals  Clinical Goals: Decrease in agitation  Patient Goals: Rest/ sleep overnight  Family Goals: NA    Progress made toward(s) clinical / shift goals:  Patient has remained calm and cooperative for the majority of the shift    Patient is not progressing towards the following goals:

## 2023-09-20 NOTE — ASSESSMENT & PLAN NOTE
- Due to intentional drug overdose in an apparent suicide attempt.    Had to be temporarily placed on Precedex drip to control behavior. Downgraded on 9/24.

## 2023-09-20 NOTE — PROGRESS NOTES
Bedside report received from night RN. Assumed care of patient. Alert and oriented X4, resting comfortably in bed. On RA, no SOB/respiratory distress noted. Pt denies pain, nausea and vomiting at this time. Safety and fall precautions in place. Sitter at bedside. Hourly rounding ongoing.

## 2023-09-20 NOTE — PROGRESS NOTES
BSSR received from day shift RN. Patient A&O x 4 ambulating in room. Patient is showing no signs of aggression but is somewhat anxious. Patient is in no apparent distress. Bed is in low position with brakes applied. 1-1 sitter at bedside. Patient states no needs at this time.

## 2023-09-20 NOTE — DISCHARGE PLANNING
Received Stipulation and Order from the court continuing pt's legal hold until 9/28, scanned copy into pt's chart.

## 2023-09-21 LAB
ANION GAP SERPL CALC-SCNC: 12 MMOL/L (ref 7–16)
BASOPHILS # BLD AUTO: 0.7 % (ref 0–1.8)
BASOPHILS # BLD: 0.07 K/UL (ref 0–0.12)
BUN SERPL-MCNC: 14 MG/DL (ref 8–22)
CALCIUM SERPL-MCNC: 10 MG/DL (ref 8.4–10.2)
CHLORIDE SERPL-SCNC: 101 MMOL/L (ref 96–112)
CO2 SERPL-SCNC: 25 MMOL/L (ref 20–33)
CREAT SERPL-MCNC: 1.12 MG/DL (ref 0.5–1.4)
EOSINOPHIL # BLD AUTO: 0.32 K/UL (ref 0–0.51)
EOSINOPHIL NFR BLD: 3 % (ref 0–6.9)
ERYTHROCYTE [DISTWIDTH] IN BLOOD BY AUTOMATED COUNT: 42 FL (ref 35.9–50)
GFR SERPLBLD CREATININE-BSD FMLA CKD-EPI: 87 ML/MIN/1.73 M 2
GLUCOSE SERPL-MCNC: 87 MG/DL (ref 65–99)
HCT VFR BLD AUTO: 44.8 % (ref 42–52)
HGB BLD-MCNC: 15.4 G/DL (ref 14–18)
IMM GRANULOCYTES # BLD AUTO: 0.07 K/UL (ref 0–0.11)
IMM GRANULOCYTES NFR BLD AUTO: 0.7 % (ref 0–0.9)
LYMPHOCYTES # BLD AUTO: 3.54 K/UL (ref 1–4.8)
LYMPHOCYTES NFR BLD: 33.6 % (ref 22–41)
MCH RBC QN AUTO: 32.7 PG (ref 27–33)
MCHC RBC AUTO-ENTMCNC: 34.4 G/DL (ref 32.3–36.5)
MCV RBC AUTO: 95.1 FL (ref 81.4–97.8)
MONOCYTES # BLD AUTO: 0.84 K/UL (ref 0–0.85)
MONOCYTES NFR BLD AUTO: 8 % (ref 0–13.4)
NEUTROPHILS # BLD AUTO: 5.69 K/UL (ref 1.82–7.42)
NEUTROPHILS NFR BLD: 54 % (ref 44–72)
NRBC # BLD AUTO: 0 K/UL
NRBC BLD-RTO: 0 /100 WBC (ref 0–0.2)
PLATELET # BLD AUTO: 437 K/UL (ref 164–446)
PMV BLD AUTO: 9 FL (ref 9–12.9)
POTASSIUM SERPL-SCNC: 4.5 MMOL/L (ref 3.6–5.5)
RBC # BLD AUTO: 4.71 M/UL (ref 4.7–6.1)
SODIUM SERPL-SCNC: 138 MMOL/L (ref 135–145)
WBC # BLD AUTO: 10.5 K/UL (ref 4.8–10.8)

## 2023-09-21 PROCEDURE — A9270 NON-COVERED ITEM OR SERVICE: HCPCS | Performed by: HOSPITALIST

## 2023-09-21 PROCEDURE — 99233 SBSQ HOSP IP/OBS HIGH 50: CPT | Performed by: INTERNAL MEDICINE

## 2023-09-21 PROCEDURE — 770001 HCHG ROOM/CARE - MED/SURG/GYN PRIV*

## 2023-09-21 PROCEDURE — 700102 HCHG RX REV CODE 250 W/ 637 OVERRIDE(OP): Performed by: INTERNAL MEDICINE

## 2023-09-21 PROCEDURE — A9270 NON-COVERED ITEM OR SERVICE: HCPCS | Performed by: INTERNAL MEDICINE

## 2023-09-21 PROCEDURE — 700111 HCHG RX REV CODE 636 W/ 250 OVERRIDE (IP): Mod: JZ | Performed by: HOSPITALIST

## 2023-09-21 PROCEDURE — 36415 COLL VENOUS BLD VENIPUNCTURE: CPT

## 2023-09-21 PROCEDURE — 85025 COMPLETE CBC W/AUTO DIFF WBC: CPT

## 2023-09-21 PROCEDURE — 700102 HCHG RX REV CODE 250 W/ 637 OVERRIDE(OP): Performed by: HOSPITALIST

## 2023-09-21 PROCEDURE — 700111 HCHG RX REV CODE 636 W/ 250 OVERRIDE (IP): Performed by: INTERNAL MEDICINE

## 2023-09-21 PROCEDURE — 80048 BASIC METABOLIC PNL TOTAL CA: CPT

## 2023-09-21 PROCEDURE — 700111 HCHG RX REV CODE 636 W/ 250 OVERRIDE (IP): Mod: JZ

## 2023-09-21 PROCEDURE — 700111 HCHG RX REV CODE 636 W/ 250 OVERRIDE (IP): Mod: JZ | Performed by: INTERNAL MEDICINE

## 2023-09-21 PROCEDURE — A9270 NON-COVERED ITEM OR SERVICE: HCPCS | Performed by: STUDENT IN AN ORGANIZED HEALTH CARE EDUCATION/TRAINING PROGRAM

## 2023-09-21 PROCEDURE — 700102 HCHG RX REV CODE 250 W/ 637 OVERRIDE(OP): Performed by: STUDENT IN AN ORGANIZED HEALTH CARE EDUCATION/TRAINING PROGRAM

## 2023-09-21 RX ORDER — QUETIAPINE FUMARATE 100 MG/1
200 TABLET, FILM COATED ORAL NIGHTLY
Status: DISCONTINUED | OUTPATIENT
Start: 2023-09-21 | End: 2023-09-22

## 2023-09-21 RX ORDER — QUETIAPINE FUMARATE 25 MG/1
50 TABLET, FILM COATED ORAL 2 TIMES DAILY WITH MEALS
Status: DISCONTINUED | OUTPATIENT
Start: 2023-09-21 | End: 2023-09-22

## 2023-09-21 RX ORDER — ZIPRASIDONE MESYLATE 20 MG/ML
INJECTION, POWDER, LYOPHILIZED, FOR SOLUTION INTRAMUSCULAR
Status: COMPLETED
Start: 2023-09-21 | End: 2023-09-21

## 2023-09-21 RX ORDER — NICOTINE 21 MG/24HR
21 PATCH, TRANSDERMAL 24 HOURS TRANSDERMAL
Status: DISCONTINUED | OUTPATIENT
Start: 2023-09-21 | End: 2023-09-21

## 2023-09-21 RX ORDER — LORAZEPAM 2 MG/ML
2 INJECTION INTRAMUSCULAR ONCE
Status: COMPLETED | OUTPATIENT
Start: 2023-09-21 | End: 2023-09-21

## 2023-09-21 RX ORDER — ZIPRASIDONE MESYLATE 20 MG/ML
20 INJECTION, POWDER, LYOPHILIZED, FOR SOLUTION INTRAMUSCULAR ONCE
Status: COMPLETED | OUTPATIENT
Start: 2023-09-21 | End: 2023-09-21

## 2023-09-21 RX ORDER — HALOPERIDOL 5 MG/ML
4 INJECTION INTRAMUSCULAR EVERY 4 HOURS PRN
Status: DISCONTINUED | OUTPATIENT
Start: 2023-09-21 | End: 2023-09-22

## 2023-09-21 RX ADMIN — LORAZEPAM 2 MG: 2 INJECTION INTRAMUSCULAR; INTRAVENOUS at 10:22

## 2023-09-21 RX ADMIN — NICOTINE 14 MG: 14 PATCH TRANSDERMAL at 10:38

## 2023-09-21 RX ADMIN — HALOPERIDOL LACTATE 4 MG: 5 INJECTION, SOLUTION INTRAMUSCULAR at 16:16

## 2023-09-21 RX ADMIN — LORAZEPAM 2 MG: 2 INJECTION INTRAMUSCULAR; INTRAVENOUS at 06:27

## 2023-09-21 RX ADMIN — ACETAMINOPHEN 650 MG: 325 TABLET ORAL at 04:24

## 2023-09-21 RX ADMIN — LITHIUM CARBONATE 450 MG: 450 TABLET, EXTENDED RELEASE ORAL at 19:07

## 2023-09-21 RX ADMIN — LITHIUM CARBONATE 450 MG: 450 TABLET, EXTENDED RELEASE ORAL at 04:30

## 2023-09-21 RX ADMIN — ZIPRASIDONE MESYLATE 20 MG: 20 INJECTION, POWDER, LYOPHILIZED, FOR SOLUTION INTRAMUSCULAR at 06:17

## 2023-09-21 RX ADMIN — NICOTINE POLACRILEX 2 MG: 2 GUM, CHEWING BUCCAL at 14:05

## 2023-09-21 RX ADMIN — LORAZEPAM 2 MG: 1 TABLET ORAL at 15:24

## 2023-09-21 RX ADMIN — NICOTINE POLACRILEX 2 MG: 2 GUM, CHEWING BUCCAL at 19:07

## 2023-09-21 RX ADMIN — NICOTINE POLACRILEX 2 MG: 2 GUM, CHEWING BUCCAL at 10:38

## 2023-09-21 RX ADMIN — LORAZEPAM 2 MG: 1 TABLET ORAL at 04:48

## 2023-09-21 RX ADMIN — DIPHENHYDRAMINE HYDROCHLORIDE 50 MG: 50 INJECTION, SOLUTION INTRAMUSCULAR; INTRAVENOUS at 14:05

## 2023-09-21 RX ADMIN — ZIPRASIDONE MESYLATE 20 MG: 20 INJECTION, POWDER, LYOPHILIZED, FOR SOLUTION INTRAMUSCULAR at 06:27

## 2023-09-21 RX ADMIN — NICOTINE 14 MG: 14 PATCH TRANSDERMAL at 04:30

## 2023-09-21 RX ADMIN — NICOTINE POLACRILEX 2 MG: 2 GUM, CHEWING BUCCAL at 12:40

## 2023-09-21 ASSESSMENT — PAIN DESCRIPTION - PAIN TYPE
TYPE: ACUTE PAIN
TYPE: ACUTE PAIN

## 2023-09-21 NOTE — DISCHARGE PLANNING
[facility] ALERT TEAM DISCHARGE PLANNING NOTE    Date:  9/21/23  Patient Name:  Peter Grace - 38 y.o. - Discharge Planning  MRN:  [id number]   YOB: 1985  ADMISSION DATE:  9/15/2023     Writer forwarded referral packet for inpatient psychiatric care to the following community providers:  Denys Nick    Items included in the referral packet:   __x___Face Sheet   __x___Pages 1 and 2 of completed legal hold   __x___Alert Team/Psych Assessment   __x___H&P   __x___UDS   __x___Blood Alcohol   __x___Vital signs   __n/a___Pregnancy Test (if applicable)   __x___Medications List   _____Covid Screen

## 2023-09-21 NOTE — DISCHARGE PLANNING
Notice of Medical Clerance filed to the court via [employer] notifying the court that pt is now medically clear, scanned copy into pt's chart.

## 2023-09-21 NOTE — PROGRESS NOTES
Patient seen sleeping on the couch. Equal chest rise noted. RR 16. Refused vital signs at this time. Morning labs not drawn yet as patient was extremely agitated early on. Patient refusing labs at this time. Lab aware. MD aware. Will continue to monitor closely. Security personnel at bedside.

## 2023-09-21 NOTE — PROGRESS NOTES
"Charge RN note: Called to pt bedside as pt was yelling at staff that he was going to \"break out of this f---ing place if you don't give me a god damn cigarette.\" Attempted to de-escalate pt with offering a nicotine patch and nicotine gum. Pt stated that he did not care and asked why he could not leave AMA. Explained to pt once again that he is on a legal hold and therefore we cannot let him leave. Pt stated to \"get me the doctor or I am forcing my way out of here.\" Security called and Dr. Ko called to bedside. TAMEKA Dykes came to bedside as well. Pt requesting a form to complain and a form to get his medical records. Pt given forms to complete. Explained to pt that Dr. Ko has prescribed medications to help him feel less anxious. Pt stated he would not take anything until he is finished filling out the forms. Explained to pt that I would give him medications while he fills out the forms or that he would need to be placed in restraints if he continues to display physical aggression towards staff. Pt stated \"fine you f---ing bitch give me the medications while I fill out the forms\" Pt given medications ordered by Dr. Ko with security at bedside. Security to stay at bedside for safety of staff.   "

## 2023-09-21 NOTE — PROGRESS NOTES
Received bedside report from day shift ASHUTOSH Munoz at 19:15. Assumed pt care.   Pt seen AO4, ambulatory, on room air. Safety sitter at bedside. No pain and nausea reported at this time.   POC and goals discussed. Room safety checklist and  chart check completed.  Safety and fall precautions in place. Bed locked and lowest position. No other needs reported at this time.   Implementation of POC in progress.

## 2023-09-21 NOTE — CARE PLAN
The patient is Stable - Low risk of patient condition declining or worsening    Shift Goals  Clinical Goals: maintain safety; 1:1 monitoring  Patient Goals: Smoke cigarette; get out of here  Family Goals: n/a    Progress made toward(s) clinical / shift goals:  Episodes of agitation early this morning, seemed to be calm later this afternoon.    Patient is not progressing towards the following goals:      Problem: Knowledge Deficit - Standard  Goal: Patient and family/care givers will demonstrate understanding of plan of care, disease process/condition, diagnostic tests and medications  Outcome: Not Progressing     Problem: Psychosocial  Goal: Patient's level of anxiety will decrease  Outcome: Not Progressing  Goal: Patient's ability to verbalize feelings about condition will improve  Outcome: Not Progressing  Goal: Patient's ability to re-evaluate and adapt role responsibilities will improve  Outcome: Not Progressing  Goal: Patient and family will demonstrate ability to cope with life altering diagnosis and/or procedure  Outcome: Not Progressing  Goal: Spiritual and cultural needs incorporated into hospitalization  Outcome: Not Progressing

## 2023-09-21 NOTE — PROGRESS NOTES
"PT becoming verbally escalated screaming at RNs and security at bedside. Psych JOYA Schmidt contacted via voalte regarding orders.   Pt demanding nursing staff and security \"call the courts\" and demanding \"more meds\". Hospitalist and Psych updated. New orders placed by MD  "

## 2023-09-21 NOTE — PROGRESS NOTES
Hospital Medicine Daily Progress Note    Date of Service  9/21/2023    Chief Complaint  AMS    Hospital Course  Peter Grace is a 38 y.o. male with anxiety, bipolar affect and depression, admitted 9/15/2023 with altered mentation after he shared suicidal ideation and intentionally overdosed on Seroquel.  He was presumed to have taken 1250 mg. Poison control was contacted who recommended serial EKGs.  He was severely agitated and combative and required four-point restraints.  He was started on Precedex drip, along with IV Ativan and IV Benadryl.  He had improvement in mental status and was able to be weaned off Precedex.  He was put on legal hold, and psychiatry was consulted.  He was started on lithium for mood, and on Seroquel for psychosis. Psychiatry has continued legal hold, awaiting court hearing.    Interval Problem Update  9/21/2023 - I reviewed the patient's chart.  He had episodes of agitation overnight and received as needed Geodon/Ativan.  Remains hemodynamically stable and afebrile. Stable on RA.   Psychiatry has increased Seroquel to 100 at night for mood.  Psychiatry also recommended transfer to inpatient psychiatric facility.     > I have personally seen and examined the patient today.  He is calmer this morning, sleeping for the most part.  No complaints.  However, later in the morning started to become agitated again.  I have ordered as needed IV Haldol for severe agitation.    I personally reviewed all lab results mentioned above. Prior medical records from this institution and outside facilities were independently reviewed as noted. I also personally reviewed all ER physician and consultant recommendations and plans as documented above. History was independently obtained by myself. I have discussed this patient's plan of care and discharge plan at IDT rounds today with Case Management, Nursing, Nursing leadership, and other members of the IDT team.    Consultants/Specialty  critical  care and psychiatry    Code Status  Full Code    Disposition  The patient is medically cleared for discharge to home or a post-acute facility.      On legal hold, awaiting inpatient psychiatric facility placement.  I have placed the appropriate orders for post-discharge needs.    Review of Systems  ROS     Pertinent positives/negatives as mentioned above.     A complete review of systems was personally done by me. All other systems were negative.       Physical Exam  Temp:  [36.5 °C (97.7 °F)-36.9 °C (98.4 °F)] 36.5 °C (97.7 °F)  Pulse:  [78-95] 94  Resp:  [16-20] 16  BP: (130-139)/(83-89) 130/83  SpO2:  [95 %-100 %] 95 %    Physical Exam  Vitals reviewed.   Constitutional:       General: He is not in acute distress.     Appearance: Normal appearance. He is not toxic-appearing or diaphoretic.   HENT:      Head: Normocephalic and atraumatic.      Right Ear: External ear normal.      Left Ear: External ear normal.      Mouth/Throat:      Mouth: Mucous membranes are moist.      Pharynx: No oropharyngeal exudate.   Eyes:      General: No scleral icterus.     Extraocular Movements: Extraocular movements intact.      Conjunctiva/sclera: Conjunctivae normal.      Pupils: Pupils are equal, round, and reactive to light.   Cardiovascular:      Rate and Rhythm: Normal rate and regular rhythm.      Heart sounds: Normal heart sounds. No murmur heard.     No gallop.   Pulmonary:      Effort: Pulmonary effort is normal. No respiratory distress.      Breath sounds: Normal breath sounds. No stridor. No wheezing, rhonchi or rales.   Chest:      Chest wall: No tenderness.   Abdominal:      General: Bowel sounds are normal. There is no distension.      Palpations: Abdomen is soft. There is no mass.      Tenderness: There is no abdominal tenderness. There is no guarding or rebound.   Musculoskeletal:         General: No swelling. Normal range of motion.      Cervical back: Normal range of motion and neck supple.      Right lower leg: No  edema.      Left lower leg: No edema.   Lymphadenopathy:      Cervical: No cervical adenopathy.   Skin:     General: Skin is warm and dry.      Coloration: Skin is not jaundiced.      Findings: No rash.   Neurological:      General: No focal deficit present.      Mental Status: He is alert and oriented to person, place, and time.      Cranial Nerves: No cranial nerve deficit.   Psychiatric:         Mood and Affect: Mood normal.         Behavior: Behavior normal.         Judgment: Judgment normal.      Comments: Flight of ideas  Circumstantial speech  No hallucinations       I have performed the physical examination today 9/21/2023.  In review of yesterday's note, there are no new changes except as documented above.      Fluids    Intake/Output Summary (Last 24 hours) at 9/21/2023 1152  Last data filed at 9/21/2023 0433  Gross per 24 hour   Intake 1680 ml   Output --   Net 1680 ml       Laboratory                        Imaging  No orders to display        Assessment/Plan  * Toxic encephalopathy- (present on admission)  Assessment & Plan  - Due to intentional drug overdose in an apparent suicide attempt.  -Mentation has improved, back to baseline.  Off Precedex drip.  -Continue to monitor.  Continue as needed IV/p.o. Ativan for agitation/anxiety.  I will add as needed IV Haldol for severe agitation.  Continue lithium for mood stabilization, and Seroquel for psychosis.    Intentional overdose (HCC)- (present on admission)  Assessment & Plan  - In an apparent suicide attempt.  -Continuing on legal hold per psychiatry, awaiting court hearing.  Awaiting inpatient psychiatric placement.  -Cardiac status stable.  -Maintain on suicide/homicide/elopement precaution.  Continue one-on-one supervision.    Rhabdomyolysis- (present on admission)  Assessment & Plan  -Likely due to severe agitation.  CPK levels have improved.  Off IV fluids.  Creatinine has remained normal.    Leukocytosis- (present on admission)  Assessment &  Plan  - Likely reactive.  Resolved.  No signs of infection.  Holding off on antibiotics.    Bipolar affective (HCC)- (present on admission)  Assessment & Plan  - Continue lithium for mood and Seroquel for psychosis.  -Psychiatry following.  -Will need psych placement.    Amphetamine abuse (HCC)- (present on admission)  Assessment & Plan  - Will need further counseling against further amphetamine use.         VTE prophylaxis:    enoxaparin ppx      Total time spent: 53 minutes

## 2023-09-21 NOTE — PROGRESS NOTES
OVERNIGHT HOSPITALIST:    Paged by nursing Staff about this patient this morning and came to the room to see him.      Patient was asking to leave AGAINST MEDICAL ADVICE.  He was informed that he is on a court legal hold and cannot leave.  He requested them to smoke a cigarette outside which was declined and instructed on hospital policies and procedures.  Patient became very agitated and threatening staff screaming around and cursing.  Multiple attempts to de-escalate situation with different providers that failed. NAM and security notified and came to the room.    I added 20 mg of IM Geodon in addition to 2 mg of IV Ativan.  Patient demanded to read his medical records and sign a release/request for his own medical records facilitated by nursing staff.  He was also provided venues to formally complain about any problems during hospitalization including the denial of smoking outside, offered nicotine replacement which she refused.    Given escalating situation, I added violent restraints ordered.  Patient was given option to collaborate and receive medications which he did.  He had a security by bedside now for the end of the shift.    I provided signout to my colleague, Dr. Rowland about above, discussed with the charge nurse will mentions patient is cooperative with security by the bedside for now.

## 2023-09-21 NOTE — CONSULTS
"[facility]  PSYCHIATRIC CONSULTATION - Follow-up  Established Patient    DOS: 09/21/23     Reason for Admission:   reported taking 15-20 pills of seroquel. Was combative. He had a prescription bottle with him that was filled 3 days ago that was empty per report.  Police were called as the patient was reported as trespassing and when he was in the police car patient started screaming that he was suicidal and that he overdosed on Seroquel and EMS was called and the patient requested to go to a different facility other than [facility]. The most he could have taken was around 1,250 mg.    Legal Hold Status: on legal hold - court    CC:   Chief Complaint   Patient presents with    ALOC           Drug Overdose    Legal 2000     Patient BIB EMS with legal hold initiated by [employer]. Patient reports taking 15-20 pills of Seraquil. Patient combative in triage, arrived in four point restraints by EMS.                S:   Observed in security present at doorway. Presents with elevated energy to the point of hypomania, observed pacing around room, constantly moving. Reports decreased sleep during HS. Adequate appetite, mood. Admits HI towards the people who put the corpse in a box, unable to reframe the situation. States his outburst today was due to \"nicotine tonia.\" Attempted to provide rationale for his Bx. Remains focused on legal hold, expressing he feels lied to about his current situation. Able to recognize the Sx of ceci, states \"I got a little manic\" today. Future focused, expressing a desire to speak with CM about possible transition to psychiatric facility, rehab. Continue to refuse selective medication to manage condition including Risperdal, Olanzapine, states he does not like taking more Seroquel, plan to increase to manage manic Bx, Sx reported or observed include: persistent increased mood, and energy, and decreased need for sleep, lasting for 2 days. Presents an acute danger to self d/t " manic state and impulsivity.    O:   Medical ROS (as pertinent):   Recent Labs     23  1455   WBC 10.5   RBC 4.71   HEMOGLOBIN 15.4   HEMATOCRIT 44.8   MCV 95.1   MCH 32.7   RDW 42.0   PLATELETCT 437   MPV 9.0   NEUTSPOLYS 54.00   LYMPHOCYTES 33.60   MONOCYTES 8.00   EOSINOPHILS 3.00   BASOPHILS 0.70     Recent Labs     23  1455   SODIUM 138   POTASSIUM 4.5   CHLORIDE 101   CO2 25   GLUCOSE 87   BUN 14     Recent Labs     23  1455   CREATININE 1.12       EKG:   Results for orders placed or performed during the hospital encounter of 09/15/23   EKG (NOW)   Result Value Ref Range    Report       [facility] Emergency Dept.    Test Date:  2023-09-15  Pt Name:    MARTY CHRISTIANSON             Department: [facility]  MRN:        [id number]                      Room:       [place] [address]  Gender:     Male                         Technician: [id number]  :        1985                   Requested By:FELICITY RUIZ  Order #:    [id number]                    Reading MD: Felicity Ruiz    Measurements  Intervals                                Axis  Rate:       104                          P:          55  NC:         138                          QRS:        57  QRSD:       87                           T:          41  QT:         338  QTc:        445    Interpretive Statements  Sinus tachycardia  Borderline low voltage, extremity leads  Baseline wander in lead(s) I,II,aVR,V1,V2,V3,V6  No previous ECG available for comparison  Electronically Signed On 2023 00:00:19 PDT by Felicity Ruiz     EKG (NOW)   Result Value Ref Range    Report       [facility] Emergency Dept.    Test Date:  2023  Pt Name:    MARTY CHRISTIANSON             Department: [facility]  MRN:        [id number]                      Room:       [facility] 6  Gender:     Male                         Technician: jmd  :        1985                   Requested By:FELICTIY RUIZ  Order #:     [id number]                    Reading MD: Geovanni Briseno    Measurements  Intervals                                Axis  Rate:       94                           P:          66  LA:         141                          QRS:        56  QRSD:       79                           T:          66  QT:         361  QTc:        452    Interpretive Statements  Sinus rhythm  Probable left atrial enlargement  Baseline wander in lead(s) V2  Compared to ECG 09/15/2023 23:52:48  Sinus tachycardia no longer present  Electronically Signed On 2023 04:35:24 PDT by Geovanni Briseno     EKG   Result Value Ref Range    Report       [facility]    Test Date:  2023  Pt Name:    MARTY CHRISTIANSON             Department: EDS  MRN:        [id number]                      Room:       [facility]  Gender:     Male                         Technician: [id number]  :        1985                   Requested By:TIMOTHY CARTER  Order #:    [id number]                    Reading MD: Lynn Cervantes    Measurements  Intervals                                Axis  Rate:       49                           P:          47  LA:         142                          QRS:        45  QRSD:       90                           T:          68  QT:         483  QTc:        437    Interpretive Statements  Sinus bradycardia, 49 bpm  Compared to ECG 2023 04:20:02  Rate is slower and does not meet criteria for left atrial enlargement  Electronically Signed On 2023 06:19:08 PDT by Lynn Cervantes     EKG   Result Value Ref Range    Report       [facility]    Test Date:  2023  Pt Name:    MARTY CHRISTIANSON             Department: EDS  MRN:        [id number]                      Room:       [facility]  Gender:     Male                         Technician: [id number]  :        1985                   Requested By:TIMOTHY CARTER  Order #:    [id number]                    Reading MD: Lynn Cervantes    Measurements  Intervals                                 Axis  Rate:       50                           P:          49  NM:         143                          QRS:        68  QRSD:       90                           T:          72  QT:         492  QTc:        449    Interpretive Statements  Sinus bradycardia, 50 bpm  Compared to ECG 2023 18:33:05  No significant changes  Electronically Signed On 2023 06:33:22 PDT by Lynn Cervantes     EKG   Result Value Ref Range    Report       [facility]    Test Date:  2023  Pt Name:    MARTY CHRISTIANSON             Department: ICU  MRN:        [id number]                      Room:       [facility]  Gender:     Male                         Technician: [id number]  :        1985                   Requested By:TIMOTHY CARTER  Order #:    [id number]                    Reading MD: Lynn Cervantes    Measurements  Intervals                                Axis  Rate:       53                           P:          57  NM:         139                          QRS:        64  QRSD:       83                           T:          64  QT:         467  QTc:        439    Interpretive Statements  Sinus rhythm, 53 bpm  Compared to ECG 2023 22:46:04  No significant changes  Electronically Signed On 2023 06:40:52 PDT by Lynn Cervantes     EKG   Result Value Ref Range    Report       [facility]    Test Date:  2023  Pt Name:    MARTY CHRISTIANSON             Department: ICU  MRN:        [id number]                      Room:       3321  Gender:     Male                         Technician: [id number]  :        1985                   Requested By:TIMOTHY CARTER  Order #:    [id number]                    Reading MD: Kiana Velez MD    Measurements  Intervals                                Axis  Rate:       50                           P:          35  NM:         134                          QRS:        45  QRSD:       87                           T:          41  QT:         463  QTc:        423    Interpretive  "Statements  Incomplete analysis due to missing data in precordial lead(s)  Sinus bradycardia  Missing lead(s): V6  Compared to ECG 09/17/2023 03:23:15  Sinus rhythm no longer present  Electronically Signed On 09- 07:21:57 PDT by Kiana Velez MD          MSE:   /83   Pulse 94   Temp 36.5 °C (97.7 °F) (Temporal)   Resp 16   Ht 1.803 m (5' 11\")   Wt 97.5 kg (214 lb 15.2 oz)   SpO2 95%     Constitutional: as noted above  General Appearance/Behavior: 38 y.o. appears muscular good eye contact superficially cooperative hostile, Poor impulse control  Abnormal Movements: none, no PMA/PMR or tremor observed.  Gait and Posture: within normal limits  Musculoskeletal: as noted above  Mood: \"fine\"  Affect: Inappropriate   Speech: loud  Language:  spontaneous, comprehends spoken commands, and fluent   Thought Process: Tangential, Future Oriented  Thought Content: HI statement. Endorsing delusions thinking  Insight/Judgement:  poor  Alert/Orientation: alert, oriented to person, place and time  Attn/Concentration: not tested  MMSE: deferred this visit     Medications:  Scheduled Medications   Medication Dose Frequency    QUEtiapine  100 mg Nightly    lithium carbonate ER  450 mg Q12HRS    nicotine  1 Patch Daily-0600    enoxaparin (LOVENOX) injection  40 mg DAILY AT 1800       Allergies:   No Known Allergies     Assessment:   Diagnosis:   1. Intentional overdose, initial encounter (HCC) Acute   2. Psychosis, unspecified psychosis type (HCC) Acute   3. Polysubstance abuse (HCC) Acute   4. Suicidal ideation Acute   5. Non-compliance Acute   6. Methamphetamine abuse (HCC) Acute       Psychiatric:   Stimulant use disorder - meth  Schizoaffective disorder, bipolar type, current hypomania  Cannabis use disorder      Medical: as noted by the medical treatment team.      Recommendations:  Legal Status: on legal hold - court     Please transfer patient to inpatient psychiatric hospital when medically cleared and bed " is available.     Observation status:   - Line of site with sitter     Phone: Yes - Okay to use at nursing availability/discretion.   Visitors: No   Personal belongings: No      Discussed/voalted: JULIANO Rowland MD     Medication Recommendations: Final orders as per Treatment Team  Increase Seroquel to 50/50/200mg PO QHS for mood  Risks/benefits/side effects discussed, patient verbalized understanding.  Medication reconciliation was completed.     Reviewed safety plan: 911, ER, PCM, MHC, suicide crisis line, nursing staff while inpatient.     Will Continue to Follow. Thank you for the consult.

## 2023-09-21 NOTE — PROGRESS NOTES
Patient pacing the room and went out the hallway. Asking for medications to help calm him down. Managed per MAR. Also asking for some food breakfast, called kitchen.    Per MD, okay to give Nicotine patch.

## 2023-09-21 NOTE — PROGRESS NOTES
Patient refusing VS. Refusing lab draws. Does not want to be bothered unless I can give him a cigarette. Nicotine patch / gum offered, refused. Went back to sleep on the couch.

## 2023-09-21 NOTE — DISCHARGE PLANNING
Alert Team Note:    Contacted by [facility], spoke to [facility]. Per [facility], facility unable to accept pt while pt is denying SI/HI. This is because FFS will not cover pts stay while he denies SI/HI.   Informed Giana Mantilla LCSW.   Writer contacted [facility] and inquired if FFS will cover pt due to psychosis instead of SI/HI. Per Any, she will ask [facility] and call back.

## 2023-09-21 NOTE — CARE PLAN
The patient is Stable - Low risk of patient condition declining or worsening    Shift Goals  Clinical Goals: Maintain safety, continue 1:1 monitoring  Patient Goals: Food  Family Goals: NA    Progress made toward(s) clinical / shift goals:  1:1 monitoring continued, safety precautions kept in place, snacks provided per pt request, safety maintained throughout this shift.     Patient is not progressing towards the following goals:    N/a

## 2023-09-21 NOTE — CARE PLAN
The patient is Stable - Low risk of patient condition declining or worsening    Shift Goals  Clinical Goals: Maintain safety, continue 1:1 monitoring  Patient Goals: Food  Family Goals: NA    Progress made toward(s) clinical / shift goals:      Food provided per pt request. Safety sitter maintained overnight, pt has still been agitated, threatening, PRN ativan given, security called, charge, NAM and MD aware. MD placed orders.     Patient is not progressing towards the following goals:     Knowledge deficit- not progressing  Anxiety- not progressing

## 2023-09-21 NOTE — PROGRESS NOTES
"Vtals assessed, pt ripped BP cuff from arm saying \"Why is this taking so long?\".  Pt agitated, threatening staff. Informed pt security will be called if he continues to display aggressive behavior. PRN Ativan given c/o Jono BOYKIN. Escalated to charge.      05:27 Pt agitated, wanting to leave for \"Fresh air\".  Pt wanting to smoke, informed pt he cannot smoke.   Pt removed nicotine patch from arm.   Security called c/o charge RN.     05:30 Security at bedside  "

## 2023-09-21 NOTE — DISCHARGE PLANNING
Case Management Discharge Planning    Admission Date: 9/15/2023  GMLOS: 3.6  ALOS: 5    6-Clicks ADL Score: 24  6-Clicks Mobility Score: 24      Anticipated Discharge Dispo: Discharge Disposition: D/T to psych hosp or distinct part unit (65)    DME Needed: No    Action(s) Taken: Updated Provider/Nurse on Discharge Plan and OTHER: RN CM notified by Attending that patient is medically cleared and can discharge to inpatient psych. Per chart, the patient's legal hold is active until 09/28/2023. Patient's plan includes discharge to an inpatient psychiatric unit/facility. Patient states he was to check himself into [facility] on Friday but had ended up admitted to [facility]. RN CM faxed the legal hold paperwork to CATRINA Haynes with the Alert team and made a call to her to confirm her receipt of the fax. Pending inpatient psych bed for discharge.    Escalations Completed: DPA with the Alert Team, Provider, Pending Discharge Destination, and Bedside RN    Medically Clear: Yes    Next Steps: Follow-up with the Attending and Bedside RN for any issues/changes and update the discharge plan accordingly. Follow-up with CATRINA Haynes with the Alert Team, regarding placement at an inpatient psych facility/unit.    Barriers to Discharge: Pending Placement    Is the patient up for discharge tomorrow: No, pending available inpatient psych bed.
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
## 2023-09-22 PROCEDURE — 770001 HCHG ROOM/CARE - MED/SURG/GYN PRIV*

## 2023-09-22 PROCEDURE — 99233 SBSQ HOSP IP/OBS HIGH 50: CPT | Performed by: INTERNAL MEDICINE

## 2023-09-22 PROCEDURE — A9270 NON-COVERED ITEM OR SERVICE: HCPCS | Performed by: HOSPITALIST

## 2023-09-22 PROCEDURE — 700111 HCHG RX REV CODE 636 W/ 250 OVERRIDE (IP): Mod: JZ | Performed by: INTERNAL MEDICINE

## 2023-09-22 PROCEDURE — 700102 HCHG RX REV CODE 250 W/ 637 OVERRIDE(OP): Performed by: INTERNAL MEDICINE

## 2023-09-22 PROCEDURE — A9270 NON-COVERED ITEM OR SERVICE: HCPCS | Performed by: STUDENT IN AN ORGANIZED HEALTH CARE EDUCATION/TRAINING PROGRAM

## 2023-09-22 PROCEDURE — 700111 HCHG RX REV CODE 636 W/ 250 OVERRIDE (IP)

## 2023-09-22 PROCEDURE — A9270 NON-COVERED ITEM OR SERVICE: HCPCS | Performed by: INTERNAL MEDICINE

## 2023-09-22 PROCEDURE — 700102 HCHG RX REV CODE 250 W/ 637 OVERRIDE(OP): Performed by: STUDENT IN AN ORGANIZED HEALTH CARE EDUCATION/TRAINING PROGRAM

## 2023-09-22 PROCEDURE — 700102 HCHG RX REV CODE 250 W/ 637 OVERRIDE(OP): Performed by: HOSPITALIST

## 2023-09-22 RX ORDER — HALOPERIDOL 5 MG/ML
4 INJECTION INTRAMUSCULAR EVERY 4 HOURS PRN
Status: DISCONTINUED | OUTPATIENT
Start: 2023-09-22 | End: 2023-10-06 | Stop reason: HOSPADM

## 2023-09-22 RX ORDER — RISPERIDONE 1 MG/1
2 TABLET ORAL EVERY EVENING
Status: DISCONTINUED | OUTPATIENT
Start: 2023-09-22 | End: 2023-09-24

## 2023-09-22 RX ORDER — RISPERIDONE 1 MG/1
1 TABLET ORAL 2 TIMES DAILY
Status: DISCONTINUED | OUTPATIENT
Start: 2023-09-23 | End: 2023-09-29

## 2023-09-22 RX ORDER — ZIPRASIDONE MESYLATE 20 MG/ML
20 INJECTION, POWDER, LYOPHILIZED, FOR SOLUTION INTRAMUSCULAR EVERY 12 HOURS PRN
Status: DISCONTINUED | OUTPATIENT
Start: 2023-09-22 | End: 2023-09-29

## 2023-09-22 RX ORDER — ZIPRASIDONE MESYLATE 20 MG/ML
20 INJECTION, POWDER, LYOPHILIZED, FOR SOLUTION INTRAMUSCULAR ONCE
Status: COMPLETED | OUTPATIENT
Start: 2023-09-22 | End: 2023-09-22

## 2023-09-22 RX ORDER — ZIPRASIDONE MESYLATE 20 MG/ML
20 INJECTION, POWDER, LYOPHILIZED, FOR SOLUTION INTRAMUSCULAR EVERY 4 HOURS PRN
Status: DISCONTINUED | OUTPATIENT
Start: 2023-09-22 | End: 2023-09-22

## 2023-09-22 RX ADMIN — LORAZEPAM 2 MG: 1 TABLET ORAL at 13:51

## 2023-09-22 RX ADMIN — QUETIAPINE FUMARATE 50 MG: 25 TABLET ORAL at 17:51

## 2023-09-22 RX ADMIN — NICOTINE POLACRILEX 2 MG: 2 GUM, CHEWING BUCCAL at 10:09

## 2023-09-22 RX ADMIN — NICOTINE POLACRILEX 2 MG: 2 GUM, CHEWING BUCCAL at 13:51

## 2023-09-22 RX ADMIN — LITHIUM CARBONATE 450 MG: 450 TABLET, EXTENDED RELEASE ORAL at 05:36

## 2023-09-22 RX ADMIN — ACETAMINOPHEN 650 MG: 325 TABLET ORAL at 05:44

## 2023-09-22 RX ADMIN — NICOTINE 14 MG: 14 PATCH TRANSDERMAL at 10:10

## 2023-09-22 RX ADMIN — ZIPRASIDONE MESYLATE 20 MG: 20 INJECTION, POWDER, LYOPHILIZED, FOR SOLUTION INTRAMUSCULAR at 10:47

## 2023-09-22 RX ADMIN — LORAZEPAM 2 MG: 1 TABLET ORAL at 10:09

## 2023-09-22 RX ADMIN — LORAZEPAM 2 MG: 2 INJECTION INTRAMUSCULAR; INTRAVENOUS at 17:35

## 2023-09-22 RX ADMIN — DIPHENHYDRAMINE HYDROCHLORIDE 50 MG: 50 INJECTION, SOLUTION INTRAMUSCULAR; INTRAVENOUS at 18:16

## 2023-09-22 RX ADMIN — QUETIAPINE FUMARATE 50 MG: 25 TABLET ORAL at 05:36

## 2023-09-22 RX ADMIN — LITHIUM CARBONATE 450 MG: 450 TABLET, EXTENDED RELEASE ORAL at 17:52

## 2023-09-22 RX ADMIN — LORAZEPAM 2 MG: 1 TABLET ORAL at 05:36

## 2023-09-22 RX ADMIN — NICOTINE POLACRILEX 2 MG: 2 GUM, CHEWING BUCCAL at 16:08

## 2023-09-22 RX ADMIN — HALOPERIDOL LACTATE 4 MG: 5 INJECTION, SOLUTION INTRAMUSCULAR at 16:12

## 2023-09-22 RX ADMIN — NICOTINE POLACRILEX 2 MG: 2 GUM, CHEWING BUCCAL at 05:45

## 2023-09-22 RX ADMIN — ENOXAPARIN SODIUM 40 MG: 100 INJECTION SUBCUTANEOUS at 17:52

## 2023-09-22 ASSESSMENT — PAIN DESCRIPTION - PAIN TYPE
TYPE: ACUTE PAIN
TYPE: ACUTE PAIN

## 2023-09-22 NOTE — PROGRESS NOTES
Patient woke up from sleep, shouting again and cursing at security personnel. Asking as well for case management, CM informed. Behavior managed per MAR.

## 2023-09-22 NOTE — CONSULTS
Behavioral Health [facility]  PSYCHIATRIC CONSULTATION - Follow-up  Established Patient    DOS: 09/22/23     Reason for Admission:   reported taking 15-20 pills of seroquel. Was combative. He had a prescription bottle with him that was filled 3 days ago that was empty per report.  Police were called as the patient was reported as trespassing and when he was in the police car patient started screaming that he was suicidal and that he overdosed on Seroquel and EMS was called and the patient requested to go to a different facility other than [facility]. The most he could have taken was around 1,250 mg.    Legal Hold Status: on legal hold - court    CC:   Chief Complaint   Patient presents with    ALOC           Drug Overdose    Legal 2000     Patient BIB EMS with legal hold initiated by [employer]. Patient reports taking 15-20 pills of Seraquil. Patient combative in triage, arrived in four point restraints by EMS.                S:   Observe awake, eating breakfast. Reports adequate sleep despite refusing HS medications, continues to have increased energy, hyper verbal speech, hyperactive movement, inability to sit still for more than 1 minute despite prompts. Adequate appetite, mood. Less forthcoming than previous encounter, somewhat guarded and not elaborating when prompted, more focused on getting cleared so he can self present to [place] for rehab. Education provided r/t legal hold process, next opportunity to contest, and criteria for release, medication adherence encouraged, agreeable to daytime Seroquel today. Denies A/VH, did not engage in previous perseverative content about finding a corpse at [facility]. Denies GI distress, HA, dizziness, appears to be tolerating medications. Continues to present an acute danger to self d/t manic Bx.     Required emergent medication: Geodon 20 IM, d/t acute agitation.     O:   Medical ROS (as pertinent):   Recent Labs     09/21/23  1455   WBC 10.5   RBC 4.71   HEMOGLOBIN  15.4   HEMATOCRIT 44.8   MCV 95.1   MCH 32.7   RDW 42.0   PLATELETCT 437   MPV 9.0   NEUTSPOLYS 54.00   LYMPHOCYTES 33.60   MONOCYTES 8.00   EOSINOPHILS 3.00   BASOPHILS 0.70     Recent Labs     23  1455   SODIUM 138   POTASSIUM 4.5   CHLORIDE 101   CO2 25   GLUCOSE 87   BUN 14     Recent Labs     23  1455   CREATININE 1.12       EKG:   Results for orders placed or performed during the hospital encounter of 09/15/23   EKG (NOW)   Result Value Ref Range    Report       [facility]    Test Date:  2023-09-15  Pt Name:    MARTY CHRISTIANSON             Department: EDS  MRN:        [id number]                      Room:       [facility]  Gender:     Male                         Technician: [id number]  :        1985                   Requested By:FELICITY RUIZ  Order #:    [id number]                    Reading MD: Felicity Ruiz    Measurements  Intervals                                Axis  Rate:       104                          P:          55  ID:         138                          QRS:        57  QRSD:       87                           T:          41  QT:         338  QTc:        445    Interpretive Statements  Sinus tachycardia  Borderline low voltage, extremity leads  Baseline wander in lead(s) I,II,aVR,V1,V2,V3,V6  No previous ECG available for comparison  Electronically Signed On 2023 00:00:19 PDT by Felicity Ruiz     EKG (NOW)   Result Value Ref Range    Report       [facility]    Test Date:  2023  Pt Name:    MARTY CHRISTIANSON             Department: EDS  MRN:        [id number]                      Room:       [facility] 6  Gender:     Male                         Technician: [id number]  :        1985                   Requested By:FELICITY RUIZ  Order #:    [id number]                    Reading MD: Felicity Ruiz    Measurements  Intervals                                Axis  Rate:       94                            P:          66  NJ:         141                          QRS:        56  QRSD:       79                           T:          66  QT:         361  QTc:        452    Interpretive Statements  Sinus rhythm  Probable left atrial enlargement  Baseline wander in lead(s) V2  Compared to ECG 09/15/2023 23:52:48  Sinus tachycardia no longer present  Electronically Signed On 2023 04:35:24 PDT by Geovanni Briseno     EKG   Result Value Ref Range    Report       [facility]    Test Date:  2023  Pt Name:    MARTY CHRISTIANSON             Department: [facility]  MRN:        [id number]                      Room:       [facility]  Gender:     Male                         Technician: [id number]  :        1985                   Requested By:TIMOTHY CARTER  Order #:    [id number]                    Reading MD: Lynn Cervantes    Measurements  Intervals                                Axis  Rate:       49                           P:          47  NJ:         142                          QRS:        45  QRSD:       90                           T:          68  QT:         483  QTc:        437    Interpretive Statements  Sinus bradycardia, 49 bpm  Compared to ECG 2023 04:20:02  Rate is slower and does not meet criteria for left atrial enlargement  Electronically Signed On 2023 06:19:08 PDT by Lynn Cervantes     EKG   Result Value Ref Range    Report       [facility]    Test Date:  2023  Pt Name:    MARTY CHRISTIANSON             Department: EDS  MRN:        [id number]                      Room:       [facility]  Gender:     Male                         Technician: [id number]  :        1985                   Requested By:TIMOTHY CARTER  Order #:    [id number]                    Reading MD: Lynn Cervantes    Measurements  Intervals                                Axis  Rate:       50                           P:          49  NJ:         143                          QRS:        68  QRSD:       90                            T:          72  QT:         492  QTc:        449    Interpretive Statements  Sinus bradycardia, 50 bpm  Compared to ECG 2023 18:33:05  No significant changes  Electronically Signed On 2023 06:33:22 PDT by Lynn Cervantes     EKG   Result Value Ref Range    Report       [facility]    Test Date:  2023  Pt Name:    MARTY CHRISTIANSON             Department: ICU  MRN:        [id number]                      Room:       [facility]  Gender:     Male                         Technician: [id number]  :        1985                   Requested By:TIMOTHY CARTER  Order #:    [id number]                    Reading MD: Lynn Cervantes    Measurements  Intervals                                Axis  Rate:       53                           P:          57  MD:         139                          QRS:        64  QRSD:       83                           T:          64  QT:         467  QTc:        439    Interpretive Statements  Sinus rhythm, 53 bpm  Compared to ECG 2023 22:46:04  No significant changes  Electronically Signed On 2023 06:40:52 PDT by Lynn Cervantes     EKG   Result Value Ref Range    Report       [facility]    Test Date:  2023  Pt Name:    MARTY CHRISTIANSON             Department: ICU  MRN:        [id number]                      Room:       [facility]  Gender:     Male                         Technician: [id number]  :        1985                   Requested By:TIMOTHY CARTER  Order #:    [id number]                    Reading MD: Kiana Velez MD    Measurements  Intervals                                Axis  Rate:       50                           P:          35  MD:         134                          QRS:        45  QRSD:       87                           T:          41  QT:         463  QTc:        423    Interpretive Statements  Incomplete analysis due to missing data in precordial lead(s)  Sinus bradycardia  Missing lead(s): V6  Compared to ECG 2023  "03:23:15  Sinus rhythm no longer present  Electronically Signed On 09- 07:21:57 PDT by Kiana Velez MD          MSE:   /66   Pulse 98   Temp 36.8 °C (98.2 °F) (Temporal)   Resp 18   Ht 1.803 m (5' 11\")   Wt 97.5 kg (214 lb 15.2 oz)   SpO2 95%     Constitutional: as noted above  General Appearance/Behavior: 38 y.o. appears muscular intermittent eye contact superficially cooperative, Poor impulse control   Abnormal Movements: none, hyperactive   Gait and Posture: within normal limits  Musculoskeletal: as noted above  Mood: \"alright\"  Affect: Restricted   Speech: normal rate, normal rhythm, normal tone, normal volume, and normal fluency  Language:  spontaneous, comprehends spoken commands, and fluent   Thought Process: Goal Directed and Perseverative, Future Oriented  Thought Content: Denies SI/HI, A/VH  Insight/Judgement:  poor - improving   Alert/Orientation: alert, oriented to person, place and time  Attn/Concentration: short attention span  MMSE: deferred this visit     Medications:  Scheduled Medications   Medication Dose Frequency    QUEtiapine  200 mg Nightly    QUEtiapine  50 mg BID WITH MEALS    lithium carbonate ER  450 mg Q12HRS    nicotine  1 Patch Daily-0600    enoxaparin (LOVENOX) injection  40 mg DAILY AT 1800       Allergies:   No Known Allergies     Assessment:   Diagnosis:   1. Intentional overdose, initial encounter (HCC) Acute   2. Psychosis, unspecified psychosis type (HCC) Acute   3. Polysubstance abuse (HCC) Acute   4. Suicidal ideation Acute   5. Non-compliance Acute   6. Methamphetamine abuse (HCC) Acute        Psychiatric:   Schizoaffective disorder, bipolar type, current hypomania  Stimulant use disorder - meth  Cannabis use disorder      Medical: as noted by the medical treatment team.      Recommendations:  Legal Status: on legal hold - court     Please transfer patient to inpatient psychiatric hospital when medically cleared and bed is available.     Observation " status:   - Line of site with sitter     Phone: Yes - Okay to use at nursing availability/discretion.   Visitors: No   Personal belongings: No      Discussed/voalted: Rudolph BOYKIN     Medication Recommendations: Final orders as per Treatment Team  Continue Seroquel 50/50/200mg for mood  Add Geodon 20mg IM q4h prn NTE 40mg daily  Risks/benefits/side effects discussed, patient verbalized understanding.  Medication reconciliation was completed.     Reviewed safety plan: 911, ER, PCM, MHC, suicide crisis line, nursing staff while inpatient.     Will Continue to Follow. Thank you for the consult.

## 2023-09-22 NOTE — PROGRESS NOTES
Patient seen in bed sleeping. Equal chest rise. RR 18. Did not want to be disturbed at this time. Security personnel at bedside.

## 2023-09-22 NOTE — PROGRESS NOTES
Hospital Medicine Daily Progress Note    Date of Service  9/22/2023    Chief Complaint  AMS    Hospital Course  Peter Grace is a 38 y.o. male with anxiety, bipolar affect and depression, admitted 9/15/2023 with altered mentation after he shared suicidal ideation and intentionally overdosed on Seroquel.  He was presumed to have taken 1250 mg. Poison control was contacted who recommended serial EKGs.  He was severely agitated and combative and required four-point restraints.  He was started on Precedex drip, along with IV Ativan and IV Benadryl.  He had improvement in mental status and was able to be weaned off Precedex.  He was put on legal hold, and psychiatry was consulted.  He was started on lithium for mood, and on Seroquel for psychosis. Psychiatry has continued legal hold, awaiting court hearing.  Inpatient psychiatric facility placement was pursued.    Interval Problem Update  9/22/2023 - I reviewed the patient's chart today. Uneventful night. VSS. Afebrile. Saturating well on RA.  Labs remain unremarkable.  WBC count is normal.  Hemoglobin stable.  Electrolytes and renal function are normal.  I discussed with psychiatry who recommended increasing Seroquel to 50/50/200 mg for better mood stabilization.    > I have personally seen and examined the patient today.  He is resting in bed, sleeping, arousable.  However later in the morning became agitated, ripped out his IV line.  I changed his as needed to IM, and will monitor him closely if needs behavioral restraints.    I personally reviewed all lab results mentioned above. Prior medical records from this institution and outside facilities were independently reviewed as noted. I also personally reviewed all ER physician and consultant recommendations and plans as documented above. History was independently obtained by myself. I have discussed this patient's plan of care and discharge plan at IDT rounds today with Case Management, Nursing, Nursing  leadership, and other members of the IDT team.    Consultants/Specialty  critical care and psychiatry    Code Status  Full Code    Disposition  The patient is medically cleared for discharge to home or a post-acute facility.      On legal hold, awaiting inpatient psychiatric facility placement.  I have placed the appropriate orders for post-discharge needs.    Review of Systems  ROS     Pertinent positives/negatives as mentioned above.     A complete review of systems was personally done by me. All other systems were negative.       Physical Exam  Temp:  [36.8 °C (98.2 °F)] 36.8 °C (98.2 °F)  Pulse:  [98] 98  Resp:  [18] 18  BP: (124)/(66) 124/66  SpO2:  [95 %] 95 %    Physical Exam  Vitals reviewed.   Constitutional:       General: He is not in acute distress.     Appearance: Normal appearance. He is not toxic-appearing or diaphoretic.   HENT:      Head: Normocephalic and atraumatic.      Right Ear: External ear normal.      Left Ear: External ear normal.      Mouth/Throat:      Mouth: Mucous membranes are moist.      Pharynx: No oropharyngeal exudate.   Eyes:      General: No scleral icterus.     Extraocular Movements: Extraocular movements intact.      Conjunctiva/sclera: Conjunctivae normal.      Pupils: Pupils are equal, round, and reactive to light.   Cardiovascular:      Rate and Rhythm: Normal rate and regular rhythm.      Heart sounds: Normal heart sounds. No murmur heard.     No gallop.   Pulmonary:      Effort: Pulmonary effort is normal. No respiratory distress.      Breath sounds: Normal breath sounds. No stridor. No wheezing, rhonchi or rales.   Chest:      Chest wall: No tenderness.   Abdominal:      General: Bowel sounds are normal. There is no distension.      Palpations: Abdomen is soft. There is no mass.      Tenderness: There is no abdominal tenderness. There is no guarding or rebound.   Musculoskeletal:         General: No swelling. Normal range of motion.      Cervical back: Normal range of  motion and neck supple.      Right lower leg: No edema.      Left lower leg: No edema.   Lymphadenopathy:      Cervical: No cervical adenopathy.   Skin:     General: Skin is warm and dry.      Coloration: Skin is not jaundiced.      Findings: No rash.   Neurological:      General: No focal deficit present.      Mental Status: He is alert and oriented to person, place, and time.      Cranial Nerves: No cranial nerve deficit.   Psychiatric:         Mood and Affect: Mood normal.         Behavior: Behavior normal.         Judgment: Judgment normal.      Comments: Flight of ideas  Circumstantial speech  No hallucinations       I have performed the physical examination today 9/22/2023.  In review of yesterday's note, there are no new changes except as documented above.      Fluids    Intake/Output Summary (Last 24 hours) at 9/22/2023 1104  Last data filed at 9/22/2023 0600  Gross per 24 hour   Intake 1700 ml   Output --   Net 1700 ml       Laboratory  Recent Labs     09/21/23  1455   WBC 10.5   RBC 4.71   HEMOGLOBIN 15.4   HEMATOCRIT 44.8   MCV 95.1   MCH 32.7   MCHC 34.4   RDW 42.0   PLATELETCT 437   MPV 9.0     Recent Labs     09/21/23  1455   SODIUM 138   POTASSIUM 4.5   CHLORIDE 101   CO2 25   GLUCOSE 87   BUN 14   CREATININE 1.12   CALCIUM 10.0                   Imaging  No orders to display        Assessment/Plan  * Toxic encephalopathy- (present on admission)  Assessment & Plan  - Due to intentional drug overdose in an apparent suicide attempt.  -Mentation has improved, back to baseline.  Off Precedex drip.  -Continue to monitor.  Continue as needed IV/p.o. Ativan for agitation/anxiety. Continue as needed Haldol for severe agitation, change to IM.  Continue lithium for mood stabilization, and Seroquel for psychosis.    Intentional overdose (HCC)- (present on admission)  Assessment & Plan  - In an apparent suicide attempt.  -Continuing on legal hold per psychiatry, awaiting court hearing.  Awaiting inpatient  psychiatric placement.  -Cardiac status stable.  -Maintain on suicide/homicide/elopement precaution.  Continue one-on-one supervision.    Rhabdomyolysis- (present on admission)  Assessment & Plan  -Likely due to severe agitation.  CPK levels have improved.  Off IV fluids.  Creatinine has remained normal.    Leukocytosis- (present on admission)  Assessment & Plan  - Likely reactive.  Resolved.  No signs of infection.  Holding off on antibiotics.    Bipolar affective (HCC)- (present on admission)  Assessment & Plan  - Continue lithium for mood and Seroquel for psychosis.  Increase nighttime Seroquel to 200 mg at at bedtime, and start daytime Seroquel 50 mg twice daily with meals.  -Psychiatry following.  -Will need psych placement.    Amphetamine abuse (HCC)- (present on admission)  Assessment & Plan  - Will need further counseling against further amphetamine use.         VTE prophylaxis:    enoxaparin ppx      Total time spent: 51 minutes

## 2023-09-22 NOTE — PROGRESS NOTES
Patient seen sleeping on couch.Withdrawn and refused Seroquel.Educated on importance of taking this medication.Security at the bedside.Asked for ice cream and water.

## 2023-09-22 NOTE — DISCHARGE PLANNING
Alert Team/Behavioral Health   Note:        - [facility]:  (Emerald) called to confirm patient is still needing placement. [facility] will call back when they have an update.  @1310: Talked to  (Emerald). Referral is pending review.  @1530: Talked to  (Emerald). No admissions for day shift. If able to do admissions tonight, Night Shift RN will review referral and call back.    - [facility] : Talked to [facility]. No beds currently available. Referral is on pending list.

## 2023-09-22 NOTE — CARE PLAN
The patient is Stable - Low risk of patient condition declining or worsening    Shift Goals  Clinical Goals: Safety  Patient Goals: Get out of here  Family Goals: n/a    Progress made toward(s) clinical / shift goals:  safety    Patient is not progressing towards the following goals:    Patient has 2 episodes of agitation during this shift

## 2023-09-22 NOTE — CARE PLAN
The patient is Stable - Low risk of patient condition declining or worsening    Shift Goals  Clinical Goals: Seroquel,safety  Patient Goals: sleep  Family Goals: n/a    Progress made toward(s) clinical / shift goals:  Patient educated falls preven  Problem: Fall Risk  Goal: Patient will remain free from falls  Outcome: Progressing  Note: Patient will be able to call for assistance,cooperate with medications as ordered     Problem: Psychosocial  Goal: Patient's level of anxiety will decrease  Outcome: Progressing  Note: Patient will be able to cooperate and decrease his anxiety with verbalization of feeling s and prescribed medications   tion and reinforced need to take medications as prescribed    Patient is not progressing towards the following goals:

## 2023-09-23 LAB — LITHIUM SERPL-MCNC: 0.5 MMOL/L (ref 0.6–1.2)

## 2023-09-23 PROCEDURE — 700102 HCHG RX REV CODE 250 W/ 637 OVERRIDE(OP): Performed by: HOSPITALIST

## 2023-09-23 PROCEDURE — A9270 NON-COVERED ITEM OR SERVICE: HCPCS | Performed by: STUDENT IN AN ORGANIZED HEALTH CARE EDUCATION/TRAINING PROGRAM

## 2023-09-23 PROCEDURE — 770022 HCHG ROOM/CARE - ICU (200)

## 2023-09-23 PROCEDURE — 700101 HCHG RX REV CODE 250: Performed by: INTERNAL MEDICINE

## 2023-09-23 PROCEDURE — A9270 NON-COVERED ITEM OR SERVICE: HCPCS | Performed by: INTERNAL MEDICINE

## 2023-09-23 PROCEDURE — 99233 SBSQ HOSP IP/OBS HIGH 50: CPT | Performed by: INTERNAL MEDICINE

## 2023-09-23 PROCEDURE — 700102 HCHG RX REV CODE 250 W/ 637 OVERRIDE(OP): Performed by: INTERNAL MEDICINE

## 2023-09-23 PROCEDURE — 700111 HCHG RX REV CODE 636 W/ 250 OVERRIDE (IP): Mod: JZ | Performed by: INTERNAL MEDICINE

## 2023-09-23 PROCEDURE — 99291 CRITICAL CARE FIRST HOUR: CPT | Performed by: INTERNAL MEDICINE

## 2023-09-23 PROCEDURE — 700102 HCHG RX REV CODE 250 W/ 637 OVERRIDE(OP): Performed by: STUDENT IN AN ORGANIZED HEALTH CARE EDUCATION/TRAINING PROGRAM

## 2023-09-23 PROCEDURE — 80178 ASSAY OF LITHIUM: CPT

## 2023-09-23 PROCEDURE — 700111 HCHG RX REV CODE 636 W/ 250 OVERRIDE (IP): Mod: JZ | Performed by: HOSPITALIST

## 2023-09-23 PROCEDURE — A9270 NON-COVERED ITEM OR SERVICE: HCPCS | Performed by: HOSPITALIST

## 2023-09-23 PROCEDURE — 90837 PSYTX W PT 60 MINUTES: CPT | Performed by: SOCIAL WORKER

## 2023-09-23 RX ORDER — LORAZEPAM 2 MG/ML
4 INJECTION INTRAMUSCULAR ONCE
Status: COMPLETED | OUTPATIENT
Start: 2023-09-23 | End: 2023-09-23

## 2023-09-23 RX ORDER — DEXMEDETOMIDINE HYDROCHLORIDE 4 UG/ML
.1-1.5 INJECTION, SOLUTION INTRAVENOUS CONTINUOUS
Status: DISCONTINUED | OUTPATIENT
Start: 2023-09-23 | End: 2023-09-24

## 2023-09-23 RX ORDER — HALOPERIDOL 5 MG/1
5 TABLET ORAL EVERY 6 HOURS
Status: DISCONTINUED | OUTPATIENT
Start: 2023-09-23 | End: 2023-09-24

## 2023-09-23 RX ORDER — LORAZEPAM 2 MG/ML
1 INJECTION INTRAMUSCULAR ONCE
Status: COMPLETED | OUTPATIENT
Start: 2023-09-23 | End: 2023-09-23

## 2023-09-23 RX ORDER — DIAZEPAM 5 MG/1
5 TABLET ORAL EVERY 6 HOURS
Status: DISCONTINUED | OUTPATIENT
Start: 2023-09-23 | End: 2023-09-24

## 2023-09-23 RX ADMIN — HALOPERIDOL 5 MG: 5 TABLET ORAL at 11:07

## 2023-09-23 RX ADMIN — HALOPERIDOL LACTATE 4 MG: 5 INJECTION, SOLUTION INTRAMUSCULAR at 07:05

## 2023-09-23 RX ADMIN — DIPHENHYDRAMINE HYDROCHLORIDE 50 MG: 50 INJECTION, SOLUTION INTRAMUSCULAR; INTRAVENOUS at 20:58

## 2023-09-23 RX ADMIN — ENOXAPARIN SODIUM 40 MG: 100 INJECTION SUBCUTANEOUS at 17:58

## 2023-09-23 RX ADMIN — NICOTINE 14 MG: 14 PATCH TRANSDERMAL at 07:12

## 2023-09-23 RX ADMIN — RISPERIDONE 1 MG: 1 TABLET ORAL at 10:47

## 2023-09-23 RX ADMIN — LORAZEPAM 4 MG: 2 INJECTION INTRAMUSCULAR; INTRAVENOUS at 10:58

## 2023-09-23 RX ADMIN — ACETAMINOPHEN 650 MG: 325 TABLET ORAL at 00:51

## 2023-09-23 RX ADMIN — NICOTINE POLACRILEX 2 MG: 2 GUM, CHEWING BUCCAL at 08:40

## 2023-09-23 RX ADMIN — LORAZEPAM 2 MG: 2 INJECTION INTRAMUSCULAR; INTRAVENOUS at 20:58

## 2023-09-23 RX ADMIN — LORAZEPAM 2 MG: 2 INJECTION INTRAMUSCULAR; INTRAVENOUS at 07:05

## 2023-09-23 RX ADMIN — NICOTINE POLACRILEX 2 MG: 2 GUM, CHEWING BUCCAL at 11:07

## 2023-09-23 RX ADMIN — DIAZEPAM 5 MG: 5 TABLET ORAL at 17:58

## 2023-09-23 RX ADMIN — NICOTINE POLACRILEX 2 MG: 2 GUM, CHEWING BUCCAL at 08:39

## 2023-09-23 RX ADMIN — HALOPERIDOL LACTATE 4 MG: 5 INJECTION, SOLUTION INTRAMUSCULAR at 00:22

## 2023-09-23 RX ADMIN — DIPHENHYDRAMINE HYDROCHLORIDE 50 MG: 50 INJECTION, SOLUTION INTRAMUSCULAR; INTRAVENOUS at 00:26

## 2023-09-23 RX ADMIN — LITHIUM CARBONATE 450 MG: 450 TABLET, EXTENDED RELEASE ORAL at 17:58

## 2023-09-23 RX ADMIN — LORAZEPAM 4 MG: 2 INJECTION INTRAMUSCULAR; INTRAVENOUS at 16:21

## 2023-09-23 RX ADMIN — RISPERIDONE 1 MG: 1 TABLET ORAL at 14:58

## 2023-09-23 RX ADMIN — LITHIUM CARBONATE 450 MG: 450 TABLET, EXTENDED RELEASE ORAL at 08:37

## 2023-09-23 RX ADMIN — ZIPRASIDONE MESYLATE 20 MG: 20 INJECTION, POWDER, LYOPHILIZED, FOR SOLUTION INTRAMUSCULAR at 00:11

## 2023-09-23 RX ADMIN — HALOPERIDOL 5 MG: 5 TABLET ORAL at 16:20

## 2023-09-23 RX ADMIN — DEXMEDETOMIDINE HYDROCHLORIDE 0.5 MCG/KG/HR: 4 INJECTION, SOLUTION INTRAVENOUS at 12:31

## 2023-09-23 ASSESSMENT — PAIN DESCRIPTION - PAIN TYPE
TYPE: ACUTE PAIN

## 2023-09-23 NOTE — DISCHARGE PLANNING
Alert Team/Behavioral Health   Note:      - [facility]  (Emerald) called to confirm patient is still needing placement. [facility] will call back when they have an update.  @154: Talked to  (Emerald). No beds currently available/at full capacity. They had to take patients from their ED.    - [facility] : Talked to [facility]. Referral is on pending list. No beds currently available.

## 2023-09-23 NOTE — PROGRESS NOTES
Hospital Medicine Daily Progress Note    Date of Service  9/23/2023    Chief Complaint  AMS    Hospital Course  Peter Grace is a 38 y.o. male with anxiety, bipolar affect and depression, admitted 9/15/2023 with altered mentation after he shared suicidal ideation and intentionally overdosed on Seroquel.  He was presumed to have taken 1250 mg. Poison control was contacted who recommended serial EKGs.  He was severely agitated and combative and required four-point restraints.  He was started on Precedex drip, along with IV Ativan and IV Benadryl.  He had improvement in mental status and was able to be weaned off Precedex.  He was put on legal hold, and psychiatry was consulted.  He was started on lithium for mood, and on Seroquel for psychosis. Psychiatry has continued legal hold, awaiting court hearing.  Inpatient psychiatric facility placement was pursued.    Interval Problem Update  9/23/2023 - I reviewed the patient's chart.  He had to be placed on violent restraints due to continued aggression towards staff and agitation.  He seemed to be not responding to Seroquel, so I changed his antipsychotics to Risperdal.  Remains hemodynamically stable and afebrile. Stable on RA.      > I have personally seen and examined the patient today.  Discussed with nursing, has had agitations and aggression this morning requiring IV Haldol and Ativan.  Was initially sleeping, but when awakened became agitated and aggressive.   Remains on violent restraints.      I personally reviewed all lab results mentioned above. Prior medical records from this institution and outside facilities were independently reviewed as noted. I also personally reviewed all ER physician and consultant recommendations and plans as documented above. History was independently obtained by myself. I have discussed this patient's plan of care and discharge plan at IDT rounds today with Case Management, Nursing, Nursing leadership, and other members of  the IDT team.    Consultants/Specialty  critical care and psychiatry    Code Status  Full Code    Disposition  The patient is not medically cleared for discharge to home or a post-acute facility.      On legal hold, awaiting inpatient psychiatric facility placement.  I have placed the appropriate orders for post-discharge needs.    Review of Systems  ROS     Pertinent positives/negatives as mentioned above.     A complete review of systems was personally done by me. All other systems were negative.       Physical Exam  Pulse:  [89] 89  Resp:  [20] 20  BP: (125)/(83) 125/83  SpO2:  [94 %] 94 %    Physical Exam  Vitals reviewed.   Constitutional:       General: He is not in acute distress.     Appearance: Normal appearance. He is not toxic-appearing or diaphoretic.   HENT:      Head: Normocephalic and atraumatic.      Right Ear: External ear normal.      Left Ear: External ear normal.      Mouth/Throat:      Mouth: Mucous membranes are moist.      Pharynx: No oropharyngeal exudate.   Eyes:      General: No scleral icterus.     Extraocular Movements: Extraocular movements intact.      Conjunctiva/sclera: Conjunctivae normal.      Pupils: Pupils are equal, round, and reactive to light.   Cardiovascular:      Rate and Rhythm: Normal rate and regular rhythm.      Heart sounds: Normal heart sounds. No murmur heard.     No gallop.   Pulmonary:      Effort: Pulmonary effort is normal. No respiratory distress.      Breath sounds: Normal breath sounds. No stridor. No wheezing, rhonchi or rales.   Chest:      Chest wall: No tenderness.   Abdominal:      General: Bowel sounds are normal. There is no distension.      Palpations: Abdomen is soft. There is no mass.      Tenderness: There is no abdominal tenderness. There is no guarding or rebound.   Musculoskeletal:         General: No swelling. Normal range of motion.      Cervical back: Normal range of motion and neck supple.      Right lower leg: No edema.      Left lower leg:  No edema.   Lymphadenopathy:      Cervical: No cervical adenopathy.   Skin:     General: Skin is warm and dry.      Coloration: Skin is not jaundiced.      Findings: No rash.   Neurological:      General: No focal deficit present.      Mental Status: He is alert and oriented to person, place, and time.      Cranial Nerves: No cranial nerve deficit.   Psychiatric:         Mood and Affect: Mood normal.         Behavior: Behavior normal.         Judgment: Judgment normal.      Comments: Flight of ideas  Circumstantial speech  No hallucinations  Bouts of aggression/agitation       I have performed the physical examination today 9/23/2023.  In review of yesterday's note, there are no new changes except as documented above.      Fluids    Intake/Output Summary (Last 24 hours) at 9/23/2023 1126  Last data filed at 9/23/2023 0133  Gross per 24 hour   Intake --   Output 1100 ml   Net -1100 ml       Laboratory  Recent Labs     09/21/23  1455   WBC 10.5   RBC 4.71   HEMOGLOBIN 15.4   HEMATOCRIT 44.8   MCV 95.1   MCH 32.7   MCHC 34.4   RDW 42.0   PLATELETCT 437   MPV 9.0     Recent Labs     09/21/23  1455   SODIUM 138   POTASSIUM 4.5   CHLORIDE 101   CO2 25   GLUCOSE 87   BUN 14   CREATININE 1.12   CALCIUM 10.0                   Imaging  No orders to display        Assessment/Plan  * Toxic encephalopathy- (present on admission)  Assessment & Plan  - Due to intentional drug overdose in an apparent suicide attempt.  -He continues to have aggression and agitation, requiring violent restraints.  Difficult to de-escalate without frequent medications.  -Continue lithium.  Check levels today.  He does not seem to be responding well to Seroquel.  Changed to Risperdal 1 mg twice daily and 2 mg at at bedtime.  Continue as needed IM Haldol, Geodon, and Ativan for behaviors.  Discussed with critical care, if behavior remains uncontrolled, may need to be restarted on Precedex drip.  -Continue to monitor.      Intentional overdose (HCC)-  (present on admission)  Assessment & Plan  - In an apparent suicide attempt.  -Continuing on legal hold per psychiatry, awaiting court hearing.  Awaiting inpatient psychiatric placement.  -Cardiac status stable.  -Maintain on suicide/homicide/elopement precaution.  Continue one-on-one supervision.    Rhabdomyolysis- (present on admission)  Assessment & Plan  -Likely due to severe agitation.  CPK levels have improved.  Off IV fluids.  Creatinine has remained normal.    Leukocytosis- (present on admission)  Assessment & Plan  - Likely reactive.  Resolved.  No signs of infection.  Holding off on antibiotics.    Bipolar affective (HCC)- (present on admission)  Assessment & Plan  - Continue lithium for mood.  Changed to Risperdal for better control of psychosis/behavior.   -Psychiatry following.  -Will need psych placement.    Amphetamine abuse (HCC)- (present on admission)  Assessment & Plan  - Will need further counseling against further amphetamine use.         VTE prophylaxis:    enoxaparin ppx      Total time spent: 53 minutes

## 2023-09-23 NOTE — PROGRESS NOTES
Pulm/ICU    PT aggressive and despite being on 4 point leather restraints able to turn himself sideways  Urinating all over the floor  Refusing medication  His twisting in the bed is concerning for him hurting himself and inparticular breaking bones  Nurses cannot approach him without him trying to bite and hurt them  He is worse than he was three days ago    Transfer to icu status so we can place on precedex  Hoping this calms him as restraints are making him worse and we can optimize his psych meds    D/w DR. Rowland

## 2023-09-23 NOTE — PROGRESS NOTES
Pt is refusing vitals at this time. RN educated the patient on the importance of allowing staff to take his vitals. Despite this, patient is still refusing. RN will attempt at a later time when patient is less agitated.

## 2023-09-23 NOTE — PROGRESS NOTES
"2353: Pt yelling from room that he needed to urinate. This RN and ASHUTOSH Lopez and security to bedside to assist Pt to urinate. Pt became very agitated and verbally abusive towards staff and security personnel, Pt told this RN \"I'm going to piss on you bitch\". Pt screaming and pulling at restraints, additional security personnel to bedside to assist in changing Pt's arms positioned. Pt medicated per MAR. MD Ko updated. Four-point leather bilateral wrist and ankle restraints on, security sitter at bedside.   "

## 2023-09-23 NOTE — PROGRESS NOTES
"1610 Patient pacing inside room, attempted to walked out of the room to leave hospital, halted by security personnel. Patient being verbally abusive to both security personnel present in room. PRN Vriidianal given.    1630 Patient shouting, still verbally abusive to security personnel, \"threatening to knock them both out.\" Patient saying he can use anything in the room as a weapon to harm self. MD made aware of situation. Restraints applied per order to prevent self harm and harm to others. New medications orders received.    "

## 2023-09-23 NOTE — PROGRESS NOTES
OVERNIGHT HOSPITALIST:    1:03 am: Paged by nursing Staff, patient started on Violent Restrains yesterday. I came to the room and evaluated him by the bedside. Nursing Staff just had to give him medications for agitation now. Stil verbally aggressive to Nursing Staff, using cursing words. Continue Violent Restrains due to danger for self and others. Security is by the bedside.    4:47 am: Patient was evaluate by bedside. Calmer now and sleeping. Will delay am medications as an attempt to have him calmer and more cooperative this am. Renewing Violent restrains now.

## 2023-09-23 NOTE — DISCHARGE PLANNING
1625 RN CM notified by Bedside RN that patient is shouting and requesting CM to see him at the bedside.  1650: RN CM met patient at the bedside. Patient shouting, in 4 point restraints, with police/security standing by. Patient aggressive and demanding to know when he will go to inpatient psych. RN CM informed him that there are no available beds at this time and we are still pending an open bed/acceptance. He asks how long he will need to stay in the hospital. RN CM notified him that the legal hold is through 09/28/2023, but this is based on his behaviors and could be extended via the court/. He vocalized his understanding and then went back to shouting that he wanted to see the physician, wanted an x-ray, wanted a CT scan, etc. RN CM asked him to please lower his voice due to other patient's resting. He softened his voice then started shouting again asking for the physician to be notified that he wanted him. ASHUTOSH GUTIÉRREZ notified Bedside RN of his request.

## 2023-09-23 NOTE — CARE PLAN
The patient is Stable - Low risk of patient condition declining or worsening    Shift Goals  Clinical Goals: Safety, Agitation Control  Patient Goals: Comfort, food  Family Goals: NA    Progress made toward(s) clinical / shift goals:  Q 15 violent restraint monitoring, Patient free of self harm or harm to others, no falls.   Agitation controlled with de escalation techniques and medication.     Problem: Psychosocial  Goal: Patient's level of anxiety will decrease  Outcome: Not Progressing  Goal: Patient's ability to re-evaluate and adapt role responsibilities will improve  Outcome: Not Progressing     Problem: Skin Integrity  Goal: Skin integrity is maintained or improved  Outcome: Progressing     Problem: Fall Risk  Goal: Patient will remain free from falls  Outcome: Progressing     Problem: Safety - Medical Restraint  Goal: Remains free of injury from restraints (Restraint for Interference with Medical Device)  Outcome: Progressing     Problem: Pain - Standard  Goal: Alleviation of pain or a reduction in pain to the patient’s comfort goal  Outcome: Progressing     Problem: Psychosocial  Goal: Patient's ability to verbalize feelings about condition will improve  Outcome: Progressing       Patient is not progressing towards the following goals:    Patients outbursts and aggressive behavior still prominent requiring 4 point leather violent restraints       Problem: Psychosocial  Goal: Patient's level of anxiety will decrease  Outcome: Not Progressing  Goal: Patient's ability to re-evaluate and adapt role responsibilities will improve  Outcome: Not Progressing

## 2023-09-23 NOTE — PROGRESS NOTES
"Critical Care Progress Note    Date of Admission: 9/16/23  Date of Consult: 9/16/23  Consulting: Dr. Andrea Pemberton  Reason for consult: SI/seroquel overdose    Chief Complaint:  Chief Complaint   Patient presents with    ALOC           Drug Overdose    Legal 2000     Patient BIB EMS with legal hold initiated by [employer]. Patient reports taking 15-20 pills of Seraquil. Patient combative in triage, arrived in four point restraints by EMS.      HPI:   From H&P: \"Peter Grace is a 38 y.o. male who presented 9/15/2023 with altered level of consciousness.  At this time, patient is agitated, nonverbal, information obtained from the chart.  Patient at the point time had told staff he wanted to kill himself and intentionally overdosed on Seroquel.  Judging by the amount of pills or left, it was felt the max he could have taken was 1250 mg.  He reportedly was seen at [facility], was combative and put on trespassing violation.  Apparently, this was prior to the overdose.  Because of this, police were called, patient at that time stated he had overdose and wanted to go to a different hospital.  Patient is either combative or medicated in the ER.  ERP did discuss the case with poison control who recommended serial EKGs.\"    Patient was admitted to the ICU on 9/16 d/t severe agitation requiring 4 point restraints in addition to the need for continuous cardiac monitoring.     9/16: Requiring 4 point restraints, frequent redirecting, IV Ativan and IV Benadryl in addition to Precedex drip.   9/17: the same  9/18: somewhat orientable but still very aggressive and psychiatry has been following and extended hold  9/19: still flight of ideas but better weaning off precedex and was able to transfer out of ICU. [place] 450 mg bid was started  9/22: required 4 point restraints again and pt extremely combative and aggressive  9/23: markedly worse and trying to hurt staff, screaming transfered back to icu for precedex and " "sedation so he will take his medications as he is much worse than he was a few days ago  Standing haldol added      Scheduled Medications   Medication Dose Frequency    haloperidol  5 mg Q6HR    risperiDONE  1 mg BID    risperiDONE  2 mg Q EVENING    lithium carbonate ER  450 mg Q12HRS    nicotine  1 Patch Daily-0600    enoxaparin (LOVENOX) injection  40 mg DAILY AT 1800       Allergies: Patient has no known allergies.      ROS:denies pain    Vitals:  /83   Pulse 89   Temp 36.8 °C (98.2 °F) (Temporal)   Resp 20   Ht 1.803 m (5' 11\")   Wt 97.5 kg (214 lb 15.2 oz)   SpO2 94%     Physical Exam:  Physical Exam  Vitals reviewed. Exam conducted with a chaperone present.   Constitutional:       Appearance: He is not ill-appearing, toxic-appearing or diaphoretic.   HENT:      Head: Normocephalic.      Nose: Nose normal.      Mouth/Throat:      Mouth: Mucous membranes are dry.   Eyes:      General:         Right eye: No discharge.         Left eye: No discharge.      Conjunctiva/sclera: Conjunctivae normal.   Cardiovascular:      Rate and Rhythm: Normal rate and regular rhythm.      Pulses: Normal pulses.   Pulmonary:      Effort: Pulmonary effort is normal.      Breath sounds: Normal breath sounds.   Musculoskeletal:      Right lower leg: No edema.      Left lower leg: No edema.   Skin:     General: Skin is warm.      Capillary Refill: Capillary refill takes less than 2 seconds.      Coloration: Skin is not jaundiced.   Neurological:      General: No focal deficit present.      Mental Status: He is alert.      Comments: Oriented to place and name  Very impulsice  Trying to twist out of restraints  Urinating all over the floor  Swearing  Moving all 4 extremities   Psychiatric:         Attention and Perception: He is inattentive.         Mood and Affect: Mood is elated. Affect is labile.         Behavior: Behavior is aggressive and hyperactive.         Judgment: Judgment is impulsive and inappropriate.      " Comments: Judgement not intact              Assessment/Plan:    Problem list:  #Manic epidose - bipolar  450 mg po bid lithium  On risperidol 1 mg am and 3 mg pm  Added standing haldol  Added precedex  Lithium level    #Intentional drug overdose  resolved    #Polysubstance abuse (amphetamine, cannabinoid)   Counseling when appropriate    #Rhabdomyolysis  resolved    #Metabolic encephalopathy 2/2 drug overdose  resolved      The patient remains critically ill.  Critical care time = 39 minutes in directly providing and coordinating critical care and extensive data review.  No time overlap and excludes procedures.        Please note that this dictation was created using voice recognition software. The accuracy of the dictation is limited to the abilities of the software. I have made every reasonable attempt to correct obvious errors, but I expect that there are errors of grammar and possibly content that I did not discover before finalizing the note.

## 2023-09-24 LAB — LITHIUM SERPL-MCNC: 0.7 MMOL/L (ref 0.6–1.2)

## 2023-09-24 PROCEDURE — A9270 NON-COVERED ITEM OR SERVICE: HCPCS | Performed by: INTERNAL MEDICINE

## 2023-09-24 PROCEDURE — 80178 ASSAY OF LITHIUM: CPT

## 2023-09-24 PROCEDURE — 94760 N-INVAS EAR/PLS OXIMETRY 1: CPT

## 2023-09-24 PROCEDURE — 700102 HCHG RX REV CODE 250 W/ 637 OVERRIDE(OP): Performed by: INTERNAL MEDICINE

## 2023-09-24 PROCEDURE — 99291 CRITICAL CARE FIRST HOUR: CPT | Performed by: INTERNAL MEDICINE

## 2023-09-24 PROCEDURE — A9270 NON-COVERED ITEM OR SERVICE: HCPCS | Performed by: STUDENT IN AN ORGANIZED HEALTH CARE EDUCATION/TRAINING PROGRAM

## 2023-09-24 PROCEDURE — 700111 HCHG RX REV CODE 636 W/ 250 OVERRIDE (IP): Mod: JZ | Performed by: INTERNAL MEDICINE

## 2023-09-24 PROCEDURE — 700102 HCHG RX REV CODE 250 W/ 637 OVERRIDE(OP): Performed by: STUDENT IN AN ORGANIZED HEALTH CARE EDUCATION/TRAINING PROGRAM

## 2023-09-24 PROCEDURE — 770001 HCHG ROOM/CARE - MED/SURG/GYN PRIV*

## 2023-09-24 RX ORDER — DIAZEPAM 5 MG/1
5 TABLET ORAL 3 TIMES DAILY
Status: DISCONTINUED | OUTPATIENT
Start: 2023-09-24 | End: 2023-09-28

## 2023-09-24 RX ORDER — HALOPERIDOL 5 MG/1
5 TABLET ORAL 3 TIMES DAILY
Status: DISCONTINUED | OUTPATIENT
Start: 2023-09-24 | End: 2023-09-29

## 2023-09-24 RX ORDER — RISPERIDONE 1 MG/1
2 TABLET ORAL EVERY EVENING
Status: DISCONTINUED | OUTPATIENT
Start: 2023-09-24 | End: 2023-09-29

## 2023-09-24 RX ORDER — BISACODYL 5 MG
5 TABLET, DELAYED RELEASE (ENTERIC COATED) ORAL
Status: DISCONTINUED | OUTPATIENT
Start: 2023-09-24 | End: 2023-10-06 | Stop reason: HOSPADM

## 2023-09-24 RX ADMIN — HALOPERIDOL LACTATE 4 MG: 5 INJECTION, SOLUTION INTRAMUSCULAR at 03:12

## 2023-09-24 RX ADMIN — DIAZEPAM 5 MG: 5 TABLET ORAL at 13:09

## 2023-09-24 RX ADMIN — LITHIUM CARBONATE 450 MG: 450 TABLET, EXTENDED RELEASE ORAL at 18:45

## 2023-09-24 RX ADMIN — LORAZEPAM 2 MG: 1 TABLET ORAL at 03:05

## 2023-09-24 RX ADMIN — BISACODYL 5 MG: 5 TABLET, COATED ORAL at 15:03

## 2023-09-24 RX ADMIN — DIAZEPAM 5 MG: 5 TABLET ORAL at 08:10

## 2023-09-24 RX ADMIN — HALOPERIDOL 5 MG: 5 TABLET ORAL at 05:10

## 2023-09-24 RX ADMIN — HALOPERIDOL 5 MG: 5 TABLET ORAL at 19:20

## 2023-09-24 RX ADMIN — HALOPERIDOL 5 MG: 5 TABLET ORAL at 13:09

## 2023-09-24 RX ADMIN — NICOTINE 14 MG: 14 PATCH TRANSDERMAL at 08:08

## 2023-09-24 RX ADMIN — RISPERIDONE 2 MG: 1 TABLET ORAL at 19:20

## 2023-09-24 RX ADMIN — ENOXAPARIN SODIUM 40 MG: 100 INJECTION SUBCUTANEOUS at 16:30

## 2023-09-24 RX ADMIN — NICOTINE POLACRILEX 2 MG: 2 GUM, CHEWING BUCCAL at 12:03

## 2023-09-24 RX ADMIN — RISPERIDONE 1 MG: 1 TABLET ORAL at 05:10

## 2023-09-24 RX ADMIN — NICOTINE POLACRILEX 2 MG: 2 GUM, CHEWING BUCCAL at 16:31

## 2023-09-24 RX ADMIN — RISPERIDONE 1 MG: 1 TABLET ORAL at 13:10

## 2023-09-24 RX ADMIN — LITHIUM CARBONATE 450 MG: 450 TABLET, EXTENDED RELEASE ORAL at 08:17

## 2023-09-24 RX ADMIN — DIAZEPAM 5 MG: 5 TABLET ORAL at 19:20

## 2023-09-24 ASSESSMENT — PAIN DESCRIPTION - PAIN TYPE
TYPE: ACUTE PAIN

## 2023-09-24 NOTE — CARE PLAN
The patient is Watcher - Medium risk of patient condition declining or worsening    Shift Goals  Clinical Goals: Monitor for safety  Patient Goals: SLeep  Family Goals: NA    Progress made toward(s) clinical / shift goals:    Problem: Knowledge Deficit - Standard  Goal: Patient and family/care givers will demonstrate understanding of plan of care, disease process/condition, diagnostic tests and medications  Outcome: Not Progressing     Problem: Safety - Medical Restraint  Goal: Remains free of injury from restraints (Restraint for Interference with Medical Device)  Outcome: Progressing     Problem: Hemodynamics  Goal: Patient's hemodynamics, fluid balance and neurologic status will be stable or improve  Outcome: Progressing       Patient is not progressing towards the following goals:

## 2023-09-24 NOTE — PROGRESS NOTES
12-hour chart check complete.    Monitor Summary  Rhythm: SR  Rate: 86-93  Ectopy: rPVC/PAC  Measurements: .16/.08/.36

## 2023-09-24 NOTE — PROGRESS NOTES
OVERNIGHT HOSPITALIST:    11:45 pm: Came to the ICU to evaluate patient that remains in Violent restrains- now on 2 as opposed to 4 leather restrains as compared to yesterday.    He was started on precedex infusion, but we had to stop the drip around 10:30 pm given bradycardia and hypotension. Patient wants to be left alone in the room and screams at Staff when anyone comes to the room, but otherwise ok on 2 point violent rest. Patient was given Benadryl and Ativan and will continue to closely monitor. I am renewing 2 point Violent restrains   now.       3:36 am: Renewed Violent Restrains: 2 points for now and stable. Patient refusing vitals and lab work now, but calm otherwise

## 2023-09-24 NOTE — PROGRESS NOTES
Violent restraint order from 0753  at 1153. Due to the acuity of the icu, including multiple intubations and procedures, a new order from MD was not able to be placed within the one hour time frame. Dr. Zelayaly aware of the situation. A new violent restraint order was placed at 1553 and is good for 8 hours.

## 2023-09-24 NOTE — PROGRESS NOTES
"Dr. Ko made aware of patient's noncompliance with obtaining vital signs. Instructed pt the importance of obtaining vitals. Pt still refused stating \"my BP is fine, you guys are giving meds that can lower my blood pressure.\" Assessed IV to be infiltrated. Pt agreed for me to remove IV. Instructed pt that we need new IV, patient still refusing. Will reassess again later. Pt, however, was cooperative with alternating leather restraints with security staff throughout the whole shift. Pt states that he just wants to sleep. Also notified doctor Ko of lab due for lithium level. Risks versus benefits considered in the setting of patient and staff safety.To hold off obtaining at this time.  "

## 2023-09-24 NOTE — DISCHARGE PLANNING
Alert Team/Behavioral Health   Note:       - [facility]: Talked to Charge RN (Marlo). Referral is on pending list. No beds currently available.     - [facility] : Crisis Counselor (Fara) called to confirm patient is still needing placement. Referral is on pending list and pending review by doctor. Did not receive face sheet with referral. Re-sent face sheet. No beds currently available.

## 2023-09-24 NOTE — CONSULTS
[facility]    INPATIENT ASSESSMENT    Name: Peter Grace  MRN: [id number]  : 1985  Age: 38 y.o.  Date of assessment: 2023  PCP: Pcp Pt States None  Persons in attendance: Patient    Length of intervention: 60 minutes    HPI: Per medical record, patient was transported to the Emergency Department on 9/15/23 following an intentional overdose of Seroquel with the goal of committing suicide. Patient has displayed severe agitation and combative behavior during this hospitalization per medical record. Patient was placed on a Legal hold by [employer] per medical record.    CHIEF COMPLAINT/PRESENTING ISSUE (as stated by Patient): Pteer Grace is a 38 year old male, seen sitting up on hospital bed in 4 point restraints with three security at bedside and RN at the room door. Patient's speech was rapid, tangential and intermittently loud. Patient was observed pleading to be released from the restraints. Patient has reportedly been violent, homicidal, and noncompliant with medications and most treatment recommendations.  After consulting with Bedside RN and physician, it was decided to alternate the release of two of the restraints to allow for one leg and arm to be released. This would allow some movement of extremities for patient. Security agreed to alternate the one arm, one leg restraint to give the other leg and arm the opportunity to move. This compromise was developed to allow for safety of both staff and patient.    Discussed this plan with patient who agreed to comply with nursing and security, so that he can get some relief from the 4 point restraints.     Assessment  Patient's insight and judgment appear to be significantly impaired. In addition, patient displayed agitation and verbal aggression. Patient appears to say what is needed to gain something, but appears to have no real intention of complying with any behavior he initially agrees to at the time. Patient appears very  impulsive, without clear ability to think and process what he is doing prior to engaging in the behavior. Patient continues to demonstrate risk and would benefit from psychiatric stabilization at an inpatient psychiatric facility.    Chief Complaint   Patient presents with    ALOC           Drug Overdose    Legal 2000     Patient BIB EMS with legal hold initiated by [employer]. Patient reports taking 15-20 pills of Seraquil. Patient combative in triage, arrived in four point restraints by EMS.         CURRENT LIVING SITUATION/SOCIAL SUPPORT: Unknown    BEHAVIORAL HEALTH TREATMENT HISTORY  Does patient/parent report a history of prior behavioral health treatment for patient?   Previous inconsistent mental health treatment, noncompliant with medications and psychotherapy    SAFETY ASSESSMENT - SELF  Does patient acknowledge current or past symptoms of dangerousness to self? yes  Does parent/significant other report patient has current or past symptoms of dangerousness to self? N\A  Does presenting problem suggest symptoms of dangerousness to self? Yes:     Past Current    Suicidal Thoughts: []  []    Suicidal Plans: []  []    Suicidal Intent: []  []    Suicide Attempts: []  []    Self-Injury []  []      For any boxes checked above, provide detail: Suicide attempt via overdose and alcohol.    History of suicide by family member: no  History of suicide by friend/significant other: no  Recent change in frequency/specificity/intensity of suicidal thoughts or self-harm behavior? yes -   Current access to firearms, medications, or other identified means of suicide/self-harm? No not while in the hospital  If yes, willing to restrict access to means of suicide/self-harm no but it is restricted for him - while in the hospital  Protective factors present:   none reported    SAFETY ASSESSMENT - OTHERS  Does patient acknowledge current or past symptoms of aggressive behavior or risk to others? yes  Does parent/significant other report  patient has current or past symptoms of aggressive behavior or risk to others?  N\A  Does presenting problem suggest symptoms of dangerousness to others? Yes:    History Current   Thoughts of injuring others? []  []    Threats to injure others? [x]  [x]    Plan to injure others? []  []    Intent to injure others? []  []    Has injured others? []  []    Thoughts of killing others? []  []    Threats to kill others? []  []    Plans to kill others? []  []    Intent to kill others? []  []    Has killed others? []  []    Perpetrator of sexual assault? []  []    Family history of homicide? []  []      For any boxes checked above, please provide detail: Aggressive, agitated    Recent change in frequency/specificity/intensity of thoughts or threats to harm others? yes -   Current access to firearms/other identified means of harm? no  If yes, willing to restrict access to weapons/means of harm? no- while in the hospital  Protective factors present:  none reported  Based on information provided during the current assessment, is a mandated “duty to warn” being exercised? No    Crisis Safety Plan completed and copy given to patient? no    ABUSE/NEGLECT SCREENING  Does patient report feeling “unsafe” in his/her home, or afraid of anyone?  no  Does patient report any history of physical, sexual, or emotional abuse?  no unknown  Does parent or significant other report any of the above? N\A  Is there evidence of neglect by self?  yes  Is there evidence of neglect by a caregiver? no  Does the patient/parent report any history of CPS/APS/police involvement related to suspected abuse/neglect or domestic violence? no  Based on the information provided during the current assessment, is a mandated report of suspected abuse/neglect being made?  No    SUBSTANCE USE SCREENING  Yes:  Brayan all substances used in the past 30 days:      Last Use Amount   []   Alcohol     [x]   Marijuana     []   Heroin     []   Prescription Opioids  (used  "without prescription, for    recreation, or in excess of prescribed amount)     []   Other Prescription  (used without prescription, for    recreation, or in excess of prescribed amount)     []   Cocaine      [x]   Methamphetamine     []   \"\" drugs (ectasy, MDMA)     [x]   Other substances        UDS results: upon admission: positive for Amphetamines, Benzodiazepines, Cannabinoid Metab  Breathalyzer results: not available    What consequences does the patient associate with any of the above substance use and or addictive behaviors? Work problems or losses: , Relationship problems: , Family problems: , Health problems: , Monetary problems:     Risk factors for detox (check all that apply):  []  Seizures   []  Diaphoretic (sweating)   []  Tremors   []  Hallucinations   []  Increased blood pressure   []  Decreased blood pressure   []  Other   []  None      [] Patient education on risk factors for detoxification and instructed to return to ER as needed.      MENTAL STATUS              Participation: Resistant  Grooming: Disheveled  Orientation: Alert  Behavior: Agitated, Tense, and Aggressive  Eye contact: Intense  Mood: Anxious, Angry, and Irritable  Affect: Congruent with content  Thought process: Flight of ideas  Thought content: Paranoia  Speech: Rapid and Hypertalkative  Perception: Within normal limits  Memory:  Recent:  Limited  Insight: Poor  Judgment:  Poor  Other:    Collateral information:   Source:   Significant other present in person:    Significant other by telephone   [facility] Staff-consulted   [facility] Medical Record-reviewed   Other: Physician and security     Unable to complete full assessment due to:   Acute intoxication   Patient declined to participate/engage   Patient verbally unresponsive   Significant cognitive deficits   Significant perceptual distortions or behavioral disorganization   Other:             CLINICAL IMPRESSIONS:  Primary:  History of Bipolar " disorder   Secondary:  Psychotic disorder (possibly substance induced) Methamphetamine use disorder                                       IDENTIFIED NEEDS/PLAN:  [Trigger DISPOSITION list for any items marked]     x Imminent safety risk - self x Imminent safety risk - others     Acute substance withdrawal  x Psychosis/Impaired reality testing    x Mood/anxiety   Substance use/Addictive behavior   x  Maladaptive behavior   Parent/child conflict     Family/Couples conflict   Biomedical     Housing   Financial      Legal  Occupational/Educational     Domestic violence   Other:     Recommendations and Observation Level:  Sitter: one to one  Phone: no  Visitors: no  Personal belongings: no      Legal Hold: extended    Thank you,      Giana Mantilla, Ph.D., [facility]  9/23/2023

## 2023-09-24 NOTE — PROGRESS NOTES
"Critical Care Progress Note    Date of Admission: 9/16/23  Date of Consult: 9/16/23  Consulting: Dr. Andrea Pemberton  Reason for consult: SI/seroquel overdose    Chief Complaint:  Chief Complaint   Patient presents with    ALOC           Drug Overdose    Legal 2000     Patient BIB EMS with legal hold initiated by [employer]. Patient reports taking 15-20 pills of Seraquil. Patient combative in triage, arrived in four point restraints by EMS.      HPI:   From H&P: \"Peter Grace is a 38 y.o. male who presented 9/15/2023 with altered level of consciousness.  At this time, patient is agitated, nonverbal, information obtained from the chart.  Patient at the point time had told staff he wanted to kill himself and intentionally overdosed on Seroquel.  Judging by the amount of pills or left, it was felt the max he could have taken was 1250 mg.  He reportedly was seen at [facility], was combative and put on trespassing violation.  Apparently, this was prior to the overdose.  Because of this, police were called, patient at that time stated he had overdose and wanted to go to a different hospital.  Patient is either combative or medicated in the ER.  ERP did discuss the case with poison control who recommended serial EKGs.\"    Patient was admitted to the ICU on 9/16 d/t severe agitation requiring 4 point restraints in addition to the need for continuous cardiac monitoring.     9/16: Requiring 4 point restraints, frequent redirecting, IV Ativan and IV Benadryl in addition to Precedex drip.   9/17: the same  9/18: somewhat orientable but still very aggressive and psychiatry has been following and extended hold  9/19: still flight of ideas but better weaning off precedex and was able to transfer out of ICU. [place] 450 mg bid was started  9/22: required 4 point restraints again and pt extremely combative and aggressive  9/23: markedly worse and trying to hurt staff, screaming transfered back to icu for precedex and " "sedation so he will take his medications as he is much worse than he was a few days ago  Standing haldol added  9/24: off restraints; taken meds changed haldol and valium to q 8      Scheduled Medications   Medication Dose Frequency    haloperidol  5 mg Q6HR    diazePAM  5 mg Q6HRS    risperiDONE  1 mg BID    risperiDONE  2 mg Q EVENING    lithium carbonate ER  450 mg Q12HRS    nicotine  1 Patch Daily-0600    enoxaparin (LOVENOX) injection  40 mg DAILY AT 1800       Allergies: Patient has no known allergies.      ROS:denies pain    Vitals:  BP (!) 86/43   Pulse 74   Temp 36.3 °C (97.3 °F) (Temporal)   Resp 18   Ht 1.803 m (5' 11\")   Wt 97.5 kg (214 lb 15.2 oz)   SpO2 95%     Physical Exam:  Physical Exam  Vitals reviewed. Exam conducted with a chaperone present.   Constitutional:       Appearance: He is not ill-appearing, toxic-appearing or diaphoretic.   HENT:      Head: Normocephalic.      Nose: Nose normal.      Mouth/Throat:      Mouth: Mucous membranes are dry.   Eyes:      General:         Right eye: No discharge.         Left eye: No discharge.      Conjunctiva/sclera: Conjunctivae normal.   Cardiovascular:      Rate and Rhythm: Normal rate and regular rhythm.      Pulses: Normal pulses.   Pulmonary:      Effort: Pulmonary effort is normal.      Breath sounds: Normal breath sounds.   Musculoskeletal:      Right lower leg: No edema.      Left lower leg: No edema.   Skin:     General: Skin is warm.      Capillary Refill: Capillary refill takes less than 2 seconds.      Coloration: Skin is not jaundiced.   Neurological:      General: No focal deficit present.      Mental Status: He is alert and oriented to person, place, and time.      Comments: Oriented to place and name and time  Calm   Attentive  No focal deficits   Psychiatric:         Attention and Perception: He is attentive.         Mood and Affect: Mood is not elated. Affect is not labile.         Behavior: Behavior is not aggressive or " hyperactive.         Judgment: Judgment is not inappropriate.      Comments: Judgement intact today              Assessment/Plan:    Problem list:  #Manic epidose - bipolar  450 mg po bid lithium  On risperidol 1 mg am and 3 mg pm  Added standing haldol changed to q 8 as well as valium  Somewhat improved as off restraints now and much more cooperative  Off precedex  Lithium level 0.5 but missed doses  Pt feels better today    #Intentional drug overdose  resolved    #Polysubstance abuse (amphetamine, cannabinoid)   Counseling when appropriate    #Rhabdomyolysis  resolved    #Metabolic encephalopathy 2/2 drug overdose  resolved      The patient remains critically ill.  Critical care time = 31 minutes in directly providing and coordinating critical care and extensive data review.  No time overlap and excludes procedures.        Please note that this dictation was created using voice recognition software. The accuracy of the dictation is limited to the abilities of the software. I have made every reasonable attempt to correct obvious errors, but I expect that there are errors of grammar and possibly content that I did not discover before finalizing the note.

## 2023-09-24 NOTE — CARE PLAN
The patient is Watcher - Medium risk of patient condition declining or worsening    Shift Goals  Clinical Goals: monitor for safety, medication regimen  Patient Goals: rest, shower  Family Goals: RUPERT (no family at bedside)    Progress made toward(s) clinical / shift goals:    Problem: Fall Risk  Goal: Patient will remain free from falls  Outcome: Progressing- independent in room, with 1:1     Problem: Hemodynamics  Goal: Patient's hemodynamics, fluid balance and neurologic status will be stable or improve  Outcome: Progressing- VS stable     Problem: Psychosocial  Goal: Patient's level of anxiety will decrease  Outcome: Progressing- appropriate towards staff this shift, following instructions appropriately.

## 2023-09-24 NOTE — PROGRESS NOTES
4 Eyes Skin Assessment Completed by Shanice, RN and Leif, RN.    Head WDL  Ears WDL  Nose WDL  Mouth WDL  Neck WDL  Breast/Chest WDL  Shoulder Blades WDL  Spine WDL  (R) Arm/Elbow/Hand Bruising  (L) Arm/Elbow/Hand Bruising  Abdomen WDL  Groin WDL  Scrotum/Coccyx/Buttocks WDL  (R) Leg WDL  (L) Leg WDL  (R) Heel/Foot/Toe Blanching, dry/peeling  (L) Heel/Foot/Toe Blanching, dry/peeling          Devices In Places None      Interventions In Place None, independent    Possible Skin Injury No    Pictures Uploaded Into Epic N/A  Wound Consult Placed N/A  RN Wound Prevention Protocol Ordered No

## 2023-09-25 PROCEDURE — 99233 SBSQ HOSP IP/OBS HIGH 50: CPT | Performed by: INTERNAL MEDICINE

## 2023-09-25 PROCEDURE — 94760 N-INVAS EAR/PLS OXIMETRY 1: CPT

## 2023-09-25 PROCEDURE — A9270 NON-COVERED ITEM OR SERVICE: HCPCS | Performed by: INTERNAL MEDICINE

## 2023-09-25 PROCEDURE — A9270 NON-COVERED ITEM OR SERVICE: HCPCS | Performed by: STUDENT IN AN ORGANIZED HEALTH CARE EDUCATION/TRAINING PROGRAM

## 2023-09-25 PROCEDURE — 700102 HCHG RX REV CODE 250 W/ 637 OVERRIDE(OP): Performed by: INTERNAL MEDICINE

## 2023-09-25 PROCEDURE — 700102 HCHG RX REV CODE 250 W/ 637 OVERRIDE(OP): Performed by: STUDENT IN AN ORGANIZED HEALTH CARE EDUCATION/TRAINING PROGRAM

## 2023-09-25 PROCEDURE — 770001 HCHG ROOM/CARE - MED/SURG/GYN PRIV*

## 2023-09-25 RX ORDER — LITHIUM CARBONATE 450 MG
450 TABLET, EXTENDED RELEASE ORAL DAILY
Status: DISCONTINUED | OUTPATIENT
Start: 2023-09-26 | End: 2023-09-30

## 2023-09-25 RX ORDER — LITHIUM CARBONATE 300 MG/1
600 TABLET, FILM COATED, EXTENDED RELEASE ORAL NIGHTLY
Status: DISCONTINUED | OUTPATIENT
Start: 2023-09-25 | End: 2023-10-06 | Stop reason: HOSPADM

## 2023-09-25 RX ADMIN — HALOPERIDOL 5 MG: 5 TABLET ORAL at 14:18

## 2023-09-25 RX ADMIN — LITHIUM CARBONATE 450 MG: 450 TABLET, EXTENDED RELEASE ORAL at 07:02

## 2023-09-25 RX ADMIN — LITHIUM CARBONATE 600 MG: 300 TABLET, EXTENDED RELEASE ORAL at 20:51

## 2023-09-25 RX ADMIN — RISPERIDONE 1 MG: 1 TABLET ORAL at 07:02

## 2023-09-25 RX ADMIN — NICOTINE 14 MG: 14 PATCH TRANSDERMAL at 07:02

## 2023-09-25 RX ADMIN — RISPERIDONE 1 MG: 1 TABLET ORAL at 14:18

## 2023-09-25 RX ADMIN — DIAZEPAM 5 MG: 5 TABLET ORAL at 20:51

## 2023-09-25 RX ADMIN — HALOPERIDOL 5 MG: 5 TABLET ORAL at 07:46

## 2023-09-25 RX ADMIN — DIAZEPAM 5 MG: 5 TABLET ORAL at 14:18

## 2023-09-25 RX ADMIN — RISPERIDONE 2 MG: 1 TABLET ORAL at 20:51

## 2023-09-25 RX ADMIN — DIAZEPAM 5 MG: 5 TABLET ORAL at 07:46

## 2023-09-25 RX ADMIN — HALOPERIDOL 5 MG: 5 TABLET ORAL at 20:51

## 2023-09-25 ASSESSMENT — COGNITIVE AND FUNCTIONAL STATUS - GENERAL
MOBILITY SCORE: 24
SUGGESTED CMS G CODE MODIFIER MOBILITY: CH
SUGGESTED CMS G CODE MODIFIER DAILY ACTIVITY: CH
DAILY ACTIVITIY SCORE: 24

## 2023-09-25 ASSESSMENT — PAIN DESCRIPTION - PAIN TYPE: TYPE: ACUTE PAIN

## 2023-09-25 NOTE — PROGRESS NOTES
Pt is awake in bed pacing in room. Security sitter at bedside. Safety checklist in use. RN medicated with night time medications. Pt refusing vital signs at this time. RN education provided, pt still refusing. RN discussed POC with pt. Pt is requesting to rest at this time. Fall precautions in place.

## 2023-09-25 NOTE — CARE PLAN
The patient is Stable - Low risk of patient condition declining or worsening    Shift Goals  Clinical Goals: pt will not require restraints this shift.  Patient Goals: sleep comfortably  Family Goals: RUPERT (no family at bedside)    Progress made toward(s) clinical / shift goals:      Pt received all scheduled antipsychotic medications this shift. Pt slept all night with no incidents of aggression towards staff.    Problem: Knowledge Deficit - Standard  Goal: Patient and family/care givers will demonstrate understanding of plan of care, disease process/condition, diagnostic tests and medications  Outcome: Progressing     Problem: Skin Integrity  Goal: Skin integrity is maintained or improved  Outcome: Progressing     Problem: Fall Risk  Goal: Patient will remain free from falls  Outcome: Progressing     Problem: Safety - Medical Restraint  Goal: Remains free of injury from restraints (Restraint for Interference with Medical Device)  Outcome: Progressing     Problem: Pain - Standard  Goal: Alleviation of pain or a reduction in pain to the patient’s comfort goal  Outcome: Progressing     Problem: Psychosocial  Goal: Patient's level of anxiety will decrease  Outcome: Progressing       Patient is not progressing towards the following goals:

## 2023-09-25 NOTE — CONSULTS
[facility] PSYCHIATRIC FOLLOW-UP:(established)  *Reason for admission:  reported taking 15-20 pills of seroquel. Was combative. He had a prescription bottle with him that was filled 3 days ago that was empty per report.  Police were called as the patient was reported as trespassing and when he was in the police car patient started screaming that he was suicidal and that he overdosed on Seroquel and EMS was called and the patient requested to go to a different facility other than [facility]. The most he could have taken was around 1,250 mg.   *Legal Hold Status: extended                S:  Pt says he OD'd because he found a corpse and thought it was his sister which he now knows. While not overtly psychotic he continues to have pressured speech, looseness of ideas, poor insight, trouble with focusing     Last incident in restraints for what pt told me: masturbating in front of a staff person, was 9/24, tx tm added haldol and valium. Per notes doing better today. Pt told that he will not be released until he is 72 hours stable meaning, no yelling, taking meds routinely, sexually appropriate etc. He can be angry, etc and appropriately report his feelings.     O: Medical ROS (as pertinent):                      *Psychiatric Examination:   Vitals:   Vitals:    09/24/23 0800 09/24/23 1245 09/25/23 0700 09/25/23 0851   BP: 132/65 117/69     Pulse: 75 95 (!) 112 (!) 121   Resp: 16 16 18 18   TempSrc:       SpO2: 98% 99%  98%   Weight:       Height:         General Appearance:  good eye contact, trying to cooperate  Abnormal Movements: none though has mildly increased energy  Gait and Posture: within normal limits  Speech: pressured  Thought Process: fast rate  Associations:   loose  Abnormal or Psychotic Thoughts:  none overtly  Judgement and Insight: limited  Orientation: grossly intact  Recent and Remote Memory: grossly intact  Attention Span and Concentration: distracted by  thoughts  Language:fluent  Fund of Knowledge: not tested  Mood and Affect: elevated  SI/HI:  suicidal - no and homicidal - no        Current Medications:  Scheduled Medications   Medication Dose Frequency    diazePAM  5 mg TID    haloperidol  5 mg TID    risperiDONE  2 mg Q EVENING    risperiDONE  1 mg BID    lithium carbonate ER  450 mg Q12HRS    nicotine  1 Patch Daily-0600    enoxaparin (LOVENOX) injection  40 mg DAILY AT 1800      Latest Reference Range & Units 09/18/23 13:45 09/23/23 03:00 09/24/23 10:48   Lithium 0.6 - 1.2 mmol/L 0.1 (L) 0.5 (L) 0.7   (L): Data is abnormally low       *ASSESSMENT/RECOMENDATIONS:  1.Methamphetamine use disorder  2  Bipolar disorder manic with psychosis the latter improved      R/O schizoaffective disorder    4  THC use    Medical:   None acutely     Legal hold: extended  Observation status:   -line of site with sitter  Phone: Yes - Okay to use at nursing availability/discretion.   Visitors: No   Personal belongings: No     Discussed/voalted: JULIANO Rowland MD    Medication and Other Recommendations: final orders as per Tx Tm  add lithium 150 mg to current dose> (300 mg of lithium can increase level by 0.2-0.5. the latter could but him into toxic range on 300 mg)  Lithium level 9/30  3    may need depakote added to lithium if the increase in lithium does not abort his ceci. Will re eval him meds regimen when lithium level back and based on clinical presentation at that time    Will continue to follow with you.        Discharge recommendations: inpt.    If released from [facility]: Discharge Instructions:  -Reviewed safety plan: 911, ER, PCM, MHC, Suicide crisis line  -Please assist with outpatient Psychiatric/substance use follow up appointments at discharge once medically cleared.

## 2023-09-25 NOTE — PROGRESS NOTES
Hospital Medicine Daily Progress Note    Date of Service  9/25/2023    Chief Complaint  AMS    Hospital Course  Peter Grace is a 38 y.o. male with anxiety, bipolar affect and depression, admitted 9/15/2023 with altered mentation after he shared suicidal ideation and intentionally overdosed on Seroquel.  He was presumed to have taken 1250 mg. Poison control was contacted who recommended serial EKGs.  He was severely agitated and combative and required four-point restraints.  He was started on Precedex drip, along with IV Ativan and IV Benadryl.  He had improvement in mental status and was able to be weaned off Precedex.  He was put on legal hold, and psychiatry was consulted.  He was started on lithium for mood, and on Risperdal for psychosis.  Due to his continued aggression and agitation, he was placed briefly on violent restraints and Precedex in the ICU and with further adjustments in antipsychotics including addition of standing Haldol and valium, behavior improved and he was able to be weaned off restraints and sedation.  Psychiatry has continued legal hold, awaiting court hearing.  Inpatient psychiatric facility placement was pursued.    Interval Problem Update  9/25/2023 - I reviewed the patient's chart. There were no significant overnight events. Remains hemodynamically stable and afebrile. Stable on RA.  No new labs.  Lithium level 0.7.  He remains pending inpatient psych placement.    > I have personally seen and examined the patient today.  His behavior is better controlled today.  Awake, follows directions and spoke commands.  Less flight of ideas and pressured speech.  Chest pain, shortness of breath, nausea, vomiting, abdominal pain.    I personally reviewed all lab results mentioned above. Prior medical records from this institution and outside facilities were independently reviewed as noted. I also personally reviewed all ER physician and consultant recommendations and plans as documented  above. History was independently obtained by myself. I have discussed this patient's plan of care and discharge plan at IDT rounds today with Case Management, Nursing, Nursing leadership, and other members of the IDT team.    Consultants/Specialty  critical care and psychiatry    Code Status  Full Code    Disposition  The patient is not medically cleared for discharge to home or a post-acute facility.      On legal hold, awaiting inpatient psychiatric facility placement.  I have placed the appropriate orders for post-discharge needs.    Review of Systems  ROS     Pertinent positives/negatives as mentioned above.     A complete review of systems was personally done by me. All other systems were negative.       Physical Exam  Pulse:  [] 121  Resp:  [16-18] 18  BP: (117)/(69) 117/69  SpO2:  [98 %-99 %] 98 %    Physical Exam  Vitals reviewed.   Constitutional:       General: He is not in acute distress.     Appearance: Normal appearance. He is not toxic-appearing or diaphoretic.   HENT:      Head: Normocephalic and atraumatic.      Right Ear: External ear normal.      Left Ear: External ear normal.      Mouth/Throat:      Mouth: Mucous membranes are moist.      Pharynx: No oropharyngeal exudate.   Eyes:      General: No scleral icterus.     Extraocular Movements: Extraocular movements intact.      Conjunctiva/sclera: Conjunctivae normal.      Pupils: Pupils are equal, round, and reactive to light.   Cardiovascular:      Rate and Rhythm: Normal rate and regular rhythm.      Heart sounds: Normal heart sounds. No murmur heard.     No gallop.   Pulmonary:      Effort: Pulmonary effort is normal. No respiratory distress.      Breath sounds: Normal breath sounds. No stridor. No wheezing, rhonchi or rales.   Chest:      Chest wall: No tenderness.   Abdominal:      General: Bowel sounds are normal. There is no distension.      Palpations: Abdomen is soft. There is no mass.      Tenderness: There is no abdominal  tenderness. There is no guarding or rebound.   Musculoskeletal:         General: No swelling. Normal range of motion.      Cervical back: Normal range of motion and neck supple.      Right lower leg: No edema.      Left lower leg: No edema.   Lymphadenopathy:      Cervical: No cervical adenopathy.   Skin:     General: Skin is warm and dry.      Coloration: Skin is not jaundiced.      Findings: No rash.   Neurological:      General: No focal deficit present.      Mental Status: He is alert and oriented to person, place, and time.      Cranial Nerves: No cranial nerve deficit.   Psychiatric:         Mood and Affect: Mood normal.         Behavior: Behavior normal.         Judgment: Judgment normal.      Comments: Flight of ideas, improved  Circumstantial speech  No hallucinations  Calm this morning, easily redirectable             Fluids    Intake/Output Summary (Last 24 hours) at 9/25/2023 0989  Last data filed at 9/25/2023 0852  Gross per 24 hour   Intake 460 ml   Output --   Net 460 ml       Laboratory                            Imaging  No orders to display        Assessment/Plan  * Toxic encephalopathy- (present on admission)  Assessment & Plan  - Due to intentional drug overdose in an apparent suicide attempt.  Had to be temporarily placed on Precedex drip to control behavior.  -More directable and calm today.  -Continue lithium.  Continue Risperdal 1 mg twice daily and 2 mg at at bedtime, along with scheduled Haldol and Valium.  Continue as needed IM Haldol, and Geodon for behavior outbursts.    -Continue to monitor.      Intentional overdose (HCC)- (present on admission)  Assessment & Plan  - In an apparent suicide attempt.  -Continuing on legal hold per psychiatry, awaiting court hearing.  Awaiting inpatient psychiatric placement.  -Cardiac status stable.  -Maintain on suicide/homicide/elopement precaution.  Continue one-on-one supervision.    Rhabdomyolysis- (present on admission)  Assessment & Plan  -Likely  due to severe agitation.  CPK levels have improved.  Off IV fluids.  Creatinine has remained normal.    Leukocytosis- (present on admission)  Assessment & Plan  - Likely reactive.  Resolved.  No signs of infection.  Holding off on antibiotics.    Bipolar affective (HCC)- (present on admission)  Assessment & Plan  - Continue lithium for mood, and Risperdal and Haldol for psychosis/behavior.   -Psychiatry following.  -Will need psych placement.    Amphetamine abuse (HCC)- (present on admission)  Assessment & Plan  - Will need further counseling against further amphetamine use.         VTE prophylaxis:    enoxaparin ppx      My total time spent caring for the patient on the day of the encounter was 50 minutes. This does not include time spent on separately billable procedures/tests.

## 2023-09-25 NOTE — CARE PLAN
The patient is Stable - Low risk of patient condition declining or worsening    Shift Goals  Clinical Goals: free from falls / injuries  Patient Goals: go to [facility]  Family Goals: n/a    Progress made toward(s) clinical / shift goals:  free from falls / injuries; compliant and cooperative this shift; no bouts of aggression or behavioral problems    Patient is not progressing towards the following goals:       [de-identified] : \par 68-year-old male patient with diabetes mellitus, hypertension,Hyperlipidemia,alcoholic Coronary artery disease,HFrEF and cirrhosis deemed to be from alcohol presents for management.  He is currently resident of a nursing home- [facility].\par Cirrhosis diagnosed on imaging study-CT scan without contrast.  Cirrhosis deemed to be from alcohol related was drinking 5-6 beers a file up until recently.\par \par EGD 5/10/22\par Esophagus Mucosa Localized irregularity at 40 cm of the mucosa was noted in the \par Z-line and gastroesophageal junction. 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  \par Stomach Mucosa Diffuse congestion, petechiae and mosaic mucosal pattern of the \par mucosa was noted in the stomach. These findings were suggestive of portal \par hypertensive gastropathy and Biopsies were obtained to evaluate for H.Pylori \par infection.  \par Duodenum Mucosa Diffuse erythema of the mucosa was noted in the Bulb. These \par findings are compatible with duodenitis.  \par Pathology:Final Diagnosis\par Gastric random, biopsy:\par - Gastric mucosa with reactive gastropathy.\par - Immunostains for H.Pylori are negative.\par \par \par \par Colonoscopy September 2022\par Impressions: \par  \par Polyp (10 mm) in the descending colon. (Polypectomy). \par  \par Polyps (3 mm) in the mid-transverse colon. (Polypectomy). \par  \par Mild diverticulosis of the sigmoid colon. \par  \par Internal hemorrhoids\par  \par Pathology:Specimen(s) Submitted\par 1  Transverse colon biopsy\par \par Final Diagnosis\par Transverse colon polyp, biopsy;\par - Tubular adenoma.\par \par \par \par \par Cardiac history\par Past h/o MI/CAD s/p PCI LEELA x 1 pRCA 2007\par CORONARY VESSELS: The coronary circulation is right dominant.\par LM: -- LM: The vessel was large sized. Angiography showed mild\par atherosclerosis with no flow limiting lesions.\par LAD: -- Mid LAD: There was a 100 % stenosis. The lesion was moderately\par calcified. This lesion is a chronic total occlusion.\par -- Distal LAD: This is an excellent target for bypass.\par CX: -- Mid circumflex: There was a 50 % stenosis.\par -- OM2: There was a 80 % stenosis. This is an excellent target for bypass.\par RCA: -- RCA: The vessel was large sized (dominant).\par -- Proximal RCA: patent stent.\par -- Mid RCA: Patent stent.\par COMPLICATIONS: No complications occurred during the cath lab visit.\par DIAGNOSTIC IMPRESSIONS: Two vessel CAD ( of the LAD and severe OM\par disease)\par DIAGNOSTIC RECOMMENDATIONS: CT surgery consult for CABG\par Prepared and signed by\par Tai Farmer MD\par Signed 05/13/2022 12:37:1 recent ECHO: EF 30% with RWMA. Patient had a cardiac catheterization in the hospital in May 2022 which showed two-vessel coronary artery disease,  mLAD; 80% mid to distal LCX; patent RCA stent (mild ISR). \par  [FreeTextEntry1] : 68-year-old male with metabolic syndrome and extensive cardiac disease with a decreased ejection fraction, heart failure with reduced ejection fraction with a possible CABG in the future presents for management of his cirrhosis diagnosed on noncontrast CT .\par \par Labs ordered to evaluate competing etiology of liver disease.\par FibroScan ordered to follow-up on the cirrhosis diagnosis.\par \par

## 2023-09-25 NOTE — PROGRESS NOTES
Received bedside report from night RN. Patient standing in near doorway asking for soda. Denies pain. Security personnel at bedside. Safety checklist in use. Refused blood pressure monitoring at this time. Education provided, refused. Discussed plan of care. Fall and safety precaution in place. All needs met at this time.

## 2023-09-26 LAB — EKG IMPRESSION: NORMAL

## 2023-09-26 PROCEDURE — 99233 SBSQ HOSP IP/OBS HIGH 50: CPT | Performed by: INTERNAL MEDICINE

## 2023-09-26 PROCEDURE — 700102 HCHG RX REV CODE 250 W/ 637 OVERRIDE(OP): Performed by: INTERNAL MEDICINE

## 2023-09-26 PROCEDURE — A9270 NON-COVERED ITEM OR SERVICE: HCPCS | Performed by: INTERNAL MEDICINE

## 2023-09-26 PROCEDURE — 93005 ELECTROCARDIOGRAM TRACING: CPT | Performed by: INTERNAL MEDICINE

## 2023-09-26 PROCEDURE — 770001 HCHG ROOM/CARE - MED/SURG/GYN PRIV*

## 2023-09-26 PROCEDURE — 93010 ELECTROCARDIOGRAM REPORT: CPT | Performed by: INTERNAL MEDICINE

## 2023-09-26 PROCEDURE — 94760 N-INVAS EAR/PLS OXIMETRY 1: CPT

## 2023-09-26 PROCEDURE — 700102 HCHG RX REV CODE 250 W/ 637 OVERRIDE(OP): Performed by: STUDENT IN AN ORGANIZED HEALTH CARE EDUCATION/TRAINING PROGRAM

## 2023-09-26 PROCEDURE — A9270 NON-COVERED ITEM OR SERVICE: HCPCS | Performed by: STUDENT IN AN ORGANIZED HEALTH CARE EDUCATION/TRAINING PROGRAM

## 2023-09-26 RX ADMIN — DIAZEPAM 5 MG: 5 TABLET ORAL at 07:59

## 2023-09-26 RX ADMIN — LITHIUM CARBONATE 600 MG: 300 TABLET, EXTENDED RELEASE ORAL at 20:48

## 2023-09-26 RX ADMIN — HALOPERIDOL 5 MG: 5 TABLET ORAL at 07:59

## 2023-09-26 RX ADMIN — RISPERIDONE 1 MG: 1 TABLET ORAL at 13:55

## 2023-09-26 RX ADMIN — DIAZEPAM 5 MG: 5 TABLET ORAL at 20:46

## 2023-09-26 RX ADMIN — LITHIUM CARBONATE 450 MG: 450 TABLET, EXTENDED RELEASE ORAL at 07:05

## 2023-09-26 RX ADMIN — RISPERIDONE 2 MG: 1 TABLET ORAL at 20:46

## 2023-09-26 RX ADMIN — RISPERIDONE 1 MG: 1 TABLET ORAL at 07:05

## 2023-09-26 RX ADMIN — HALOPERIDOL 5 MG: 5 TABLET ORAL at 13:55

## 2023-09-26 RX ADMIN — DIAZEPAM 5 MG: 5 TABLET ORAL at 13:55

## 2023-09-26 RX ADMIN — NICOTINE 14 MG: 14 PATCH TRANSDERMAL at 07:05

## 2023-09-26 ASSESSMENT — ENCOUNTER SYMPTOMS
FEVER: 0
SHORTNESS OF BREATH: 0
HEADACHES: 0
PALPITATIONS: 0
CONSTIPATION: 0
VOMITING: 0
CHILLS: 0
NAUSEA: 0
COUGH: 0
DEPRESSION: 0
DIARRHEA: 0
HALLUCINATIONS: 0
BACK PAIN: 0
ABDOMINAL PAIN: 0
DIZZINESS: 0

## 2023-09-26 ASSESSMENT — PAIN DESCRIPTION - PAIN TYPE
TYPE: ACUTE PAIN
TYPE: ACUTE PAIN

## 2023-09-26 NOTE — CONSULTS
"[facility]  PSYCHIATRIC CONSULTATION - Follow-up  Established Patient    DOS: 09/26/23     Reason for Admission:   reported taking 15-20 pills of seroquel. Was combative. He had a prescription bottle with him that was filled 3 days ago that was empty per report.  Police were called as the patient was reported as trespassing and when he was in the police car patient started screaming that he was suicidal and that he overdosed on Seroquel and EMS was called and the patient requested to go to a different facility other than [facility]. The most he could have taken was around 1,250 mg.    Legal Hold Status: on legal hold - court    CC:   Chief Complaint   Patient presents with    ALOC           Drug Overdose    Legal 2000     Patient BIB EMS with legal hold initiated by [employer]. Patient reports taking 15-20 pills of Seraquil. Patient combative in triage, arrived in four point restraints by EMS.                S:   Observed in bed, sleeping, easy to wake. Denies SI/HI/AH/VH, no Sx of psychosis reported or observed. Reports improved sleep, decreased energy. Denies GI distress, HA, dizziness, appears to be tolerating medications. Education provided r/t legal hold process. Patient presents with bright affects, somewhat slowed speech, c/o slurred speech in AM, states subsides by afternoon. Able to vocalize his awareness of his sister being alive and the triggering incident being more hallucination than reality, unsure if r/t substance use, vocalizes responsibility for his actions r/t use, expressing a desire to maintain sobriety through IP program if possible.    O:   Medical ROS (as pertinent):   No results for input(s): \"WBC\", \"RBC\", \"HEMOGLOBIN\", \"HEMATOCRIT\", \"MCV\", \"MCH\", \"RDW\", \"PLATELETCT\", \"MPV\", \"NEUTSPOLYS\", \"LYMPHOCYTES\", \"MONOCYTES\", \"EOSINOPHILS\", \"BASOPHILS\", \"RBCMORPHOLO\" in the last 72 hours.  No results for input(s): \"SODIUM\", \"POTASSIUM\", \"CHLORIDE\", \"CO2\", \"GLUCOSE\", \"BUN\", " "\"CPKTOTAL\" in the last 72 hours.  No results for input(s): \"ALBUMIN\", \"TBILIRUBIN\", \"ALKPHOSPHAT\", \"TOTPROTEIN\", \"ALTSGPT\", \"ASTSGOT\", \"CREATININE\" in the last 72 hours.    EKG:   Results for orders placed or performed during the hospital encounter of 09/15/23   EKG (NOW)   Result Value Ref Range    Report       [facility]    Test Date:  2023-09-15  Pt Name:    MARTY CHRISTIANSON             Department: [facility]  MRN:        [id number]                      Room:       [facility] 6  Gender:     Male                         Technician: [id number]  :        1985                   Requested By:FELICITY RUIZ  Order #:    [id number]                    Reading MD: Felicity Ruiz    Measurements  Intervals                                Axis  Rate:       104                          P:          55  ME:         138                          QRS:        57  QRSD:       87                           T:          41  QT:         338  QTc:        445    Interpretive Statements  Sinus tachycardia  Borderline low voltage, extremity leads  Baseline wander in lead(s) I,II,aVR,V1,V2,V3,V6  No previous ECG available for comparison  Electronically Signed On 2023 00:00:19 PDT by Felicity Ruiz     EKG (NOW)   Result Value Ref Range    Report       [facility] Emergency Dept[facility]    Test Date:  2023  Pt Name:    MARTY CHRISTIANSON             Department: [facility]  MRN:        [id number]                      Room:       [facility] 6  Gender:     Male                         Technician: [id number]  :        1985                   Requested By:FELICITY RUIZ  Order #:    [id number]                    Reading MD: Felicity Ruiz    Measurements  Intervals                                Axis  Rate:       94                           P:          66  ME:         141                          QRS:        56  QRSD:       79                           T:          66  QT:         " 361  QTc:        452    Interpretive Statements  Sinus rhythm  Probable left atrial enlargement  Baseline wander in lead(s) V2  Compared to ECG 09/15/2023 23:52:48  Sinus tachycardia no longer present  Electronically Signed On 2023 04:35:24 PDT by Geovanni Briseno     EKG   Result Value Ref Range    Report       [facility]    Test Date:  2023  Pt Name:    MARTY CHRISTIANSON             Department: [facility]  MRN:        [id number]                      Room:       [facility]  Gender:     Male                         Technician: [id number]  :        1985                   Requested By:TIMOTHY CARTER  Order #:    [id number]                    Reading MD: Lynn Cervantes    Measurements  Intervals                                Axis  Rate:       49                           P:          47  MI:         142                          QRS:        45  QRSD:       90                           T:          68  QT:         483  QTc:        437    Interpretive Statements  Sinus bradycardia, 49 bpm  Compared to ECG 2023 04:20:02  Rate is slower and does not meet criteria for left atrial enlargement  Electronically Signed On 2023 06:19:08 PDT by Lynn Cervantes     EKG   Result Value Ref Range    Report       [facility]    Test Date:  2023  Pt Name:    MARTY CHRISTIANSON             Department: [facility]  MRN:        [id number]                      Room:       [facility]  Gender:     Male                         Technician: [id number]  :        1985                   Requested By:TIMOTHY CARTER  Order #:    [id number]                    Reading MD: Lynn Cervantes    Measurements  Intervals                                Axis  Rate:       50                           P:          49  MI:         143                          QRS:        68  QRSD:       90                           T:          72  QT:         492  QTc:        449    Interpretive Statements  Sinus bradycardia, 50 bpm  Compared to ECG 2023 18:33:05  No  significant changes  Electronically Signed On 2023 06:33:22 PDT by Lynn Cervantes     EKG   Result Value Ref Range    Report       [facility]    Test Date:  2023  Pt Name:    MARTY CHRISTIANSON             Department: ICU  MRN:        [id number]                      Room:       3321  Gender:     Male                         Technician: [id number]  :        1985                   Requested By:TIMOTHY CARTER  Order #:    [id number]                    Reading MD: Lynn Cervantes    Measurements  Intervals                                Axis  Rate:       53                           P:          57  MI:         139                          QRS:        64  QRSD:       83                           T:          64  QT:         467  QTc:        439    Interpretive Statements  Sinus rhythm, 53 bpm  Compared to ECG 2023 22:46:04  No significant changes  Electronically Signed On 2023 06:40:52 PDT by Lynn Cervantes     EKG   Result Value Ref Range    Report       [facility]    Test Date:  2023  Pt Name:    MARTY CHRISTIANSON             Department: ICU  MRN:        [id number]                      Room:       [facility]  Gender:     Male                         Technician: [id number]  :        1985                   Requested By:TIMOTHY CARTER  Order #:    [id number]                    Reading MD: Kiana Velez MD    Measurements  Intervals                                Axis  Rate:       50                           P:          35  MI:         134                          QRS:        45  QRSD:       87                           T:          41  QT:         463  QTc:        423    Interpretive Statements  Incomplete analysis due to missing data in precordial lead(s)  Sinus bradycardia  Missing lead(s): V6  Compared to ECG 2023 03:23:15  Sinus rhythm no longer present  Electronically Signed On 2023 07:21:57 PDT by Kiana Velez MD          MSE:   /81   Pulse 100   Temp 36 °C  "(96.8 °F) (Temporal)   Resp 17   Ht 1.803 m (5' 11\")   Wt 97.5 kg (214 lb 15.2 oz)   SpO2 97%     Constitutional: as noted above  General Appearance/Behavior: 38 y.o. appears muscular good eye contact cooperative, No behavioral disturbances  Abnormal Movements: none, no PMA/PMR or tremor observed.  Gait and Posture: not observed  Musculoskeletal: as noted above  Mood: \"Good\"  Affect: Mood/Congruent and Appropriate   Speech: normal rate, normal rhythm, normal tone, normal volume, and normal fluency  Language:  spontaneous, comprehends spoken commands, and fluent   Thought Process: Goal Directed, Future Oriented  Thought Content: Denies SI/HI, A/VH. No e/o delusions, or internal preoccupation  Insight/Judgement:  poor to fair - improving   Alert/Orientation: alert, only oriented to:, person, place  Attn/Concentration: short attention span  MMSE: deferred this visit     Medications:  Scheduled Medications   Medication Dose Frequency    lithium carbonate ER  600 mg Nightly    lithium carbonate ER  450 mg DAILY    diazePAM  5 mg TID    haloperidol  5 mg TID    risperiDONE  2 mg Q EVENING    risperiDONE  1 mg BID    nicotine  1 Patch Daily-0600    enoxaparin (LOVENOX) injection  40 mg DAILY AT 1800       Allergies:   No Known Allergies     Assessment:   Diagnosis:   1. Intentional overdose, initial encounter (HCC) Acute   2. Psychosis, unspecified psychosis type (HCC) Acute   3. Polysubstance abuse (HCC) Acute   4. Suicidal ideation Acute   5. Non-compliance Acute   6. Methamphetamine abuse (HCC) Acute        Schizoaffective disorder, bipolar type, current hypomania  Stimulant use disorder - meth  Cannabis use     Medical: as noted by the medical treatment team.      Recommendations:  Legal Status: on legal hold - court    Please transfer patient to inpatient psychiatric hospital when medically cleared and bed is available.    Observation status:   - Line of site with sitter    Phone: Yes - Okay to use at nursing " availability/discretion.   Visitors: No   Personal belongings: No     Discussed/voalted: Rudolph BOYKIN, TERESA Sauceda MD    Medication Recommendations: Final orders as per Treatment Team  No new medication recommendations; continue Lithium 450 QAM, Lithium 600 QHS, Haldol 5mg TID, Risperidone 1mg BID, 2mg QHS  Risks/benefits/side effects discussed, patient verbalized understanding.  Medication reconciliation was completed.      Reviewed safety plan: 911, ER, PCM, MHC, suicide crisis line, nursing staff while inpatient.    Will Continue to Follow. Thank you for the consult.

## 2023-09-26 NOTE — PROGRESS NOTES
RN was informed by CNA that patient in refusing vital signs. Education provided on importance of tracking vitals with medications being given, continues to refuse. Patient would like to rest for now.

## 2023-09-26 NOTE — PROGRESS NOTES
Received phone call from public defenders office, Shaye wanted to speak with patient regarding court date. Phone given to patient. Patient agreeable to plans. Phone returned.

## 2023-09-26 NOTE — PROGRESS NOTES
Hospital Medicine Daily Progress Note    Date of Service  9/26/2023    Chief Complaint  Peter Grace is a 38 y.o. male admitted 9/15/2023 with   Chief Complaint   Patient presents with    ALOC           Drug Overdose    Legal 2000     Patient BIB EMS with legal hold initiated by [employer]. Patient reports taking 15-20 pills of Seraquil. Patient combative in triage, arrived in four point restraints by EMS.      Hospital Course  No notes on file    Interval Problem Update  Patient had no acute complaints.  - I reviewed psych notes, lithium was increased, will need to continue monitoring levels. Last lithium level was 0.7.  - legal hold was extended, pending inpatient psychiatric facility for discharge.  - I ordered ECG to recheck QTc    I have discussed this patient's plan of care and discharge plan at IDT rounds today with Case Management, Nursing, Nursing leadership, and other members of the IDT team.    Consultants/Specialty  critical care and psychiatry    Code Status  Full Code    Disposition  The patient is medically cleared for discharge to home or a post-acute facility.  Anticipate discharge to: a psychiatric hospital    I have placed the appropriate orders for post-discharge needs.    Review of Systems  Review of Systems   Constitutional:  Negative for chills, fever and malaise/fatigue.   Respiratory:  Negative for cough and shortness of breath.    Cardiovascular:  Negative for chest pain and palpitations.   Gastrointestinal:  Negative for abdominal pain, constipation, diarrhea, nausea and vomiting.   Musculoskeletal:  Negative for back pain and joint pain.   Neurological:  Negative for dizziness and headaches.   Psychiatric/Behavioral:  Negative for depression, hallucinations and suicidal ideas.    All other systems reviewed and are negative.       Physical Exam  Temp:  [36 °C (96.8 °F)-36.6 °C (97.9 °F)] 36.6 °C (97.9 °F)  Pulse:  [100-104] 100  Resp:  [17-18] 18  BP: (123)/(81) 123/81  SpO2:  [96 %-97  %] 97 %    Physical Exam  Vitals and nursing note reviewed.   Constitutional:       General: He is not in acute distress.     Appearance: He is not diaphoretic.   HENT:      Mouth/Throat:      Mouth: Mucous membranes are dry.      Pharynx: No oropharyngeal exudate.   Cardiovascular:      Rate and Rhythm: Normal rate and regular rhythm.      Pulses: Normal pulses.      Heart sounds: Normal heart sounds. No murmur heard.  Pulmonary:      Effort: Pulmonary effort is normal. No respiratory distress.      Breath sounds: Normal breath sounds. No wheezing or rales.   Abdominal:      General: Bowel sounds are normal. There is no distension.      Tenderness: There is no abdominal tenderness.   Musculoskeletal:         General: No swelling or tenderness. Normal range of motion.   Skin:     General: Skin is warm.      Capillary Refill: Capillary refill takes less than 2 seconds.      Coloration: Skin is not jaundiced or pale.   Neurological:      General: No focal deficit present.      Mental Status: He is alert and oriented to person, place, and time. Mental status is at baseline.      Motor: No weakness.   Psychiatric:         Mood and Affect: Mood normal.         Behavior: Behavior normal.         Fluids    Intake/Output Summary (Last 24 hours) at 9/26/2023 1323  Last data filed at 9/25/2023 1800  Gross per 24 hour   Intake 960 ml   Output --   Net 960 ml       Laboratory                        Imaging  No orders to display        Assessment/Plan  * Toxic encephalopathy- (present on admission)  Assessment & Plan  - Due to intentional drug overdose in an apparent suicide attempt.    Had to be temporarily placed on Precedex drip to control behavior. Downgraded on 9/24.    -Continue lithium 450mg + 600mg.    Continue Valium 5mg TID  Contiue haldol 5mg TID  Continue risperdal 1mg BID and 2mg QHS  continue Geodon PRN  pt has been tachycardic. Last QTc 423ms. I ordered ECG to recheck QTc    Bipolar affective (HCC)- (present on  admission)  Assessment & Plan  - continue lithium, risperdal, haldol and Valium    Amphetamine abuse (HCC)- (present on admission)  Assessment & Plan  UDS positive    Rhabdomyolysis- (present on admission)  Assessment & Plan  - may have been due to agitation, but seroquel overdose may cause  - 1729 on admission, last check was 171  - pt moving/ambulating without pain    Leukocytosis- (present on admission)  Assessment & Plan  - Likely reactive.  Resolved.  No signs of infection.  Holding off on antibiotics.    Intentional overdose (HCC)- (present on admission)  Assessment & Plan  - In an apparent suicide attempt.  -Cardiac status stable.  -Maintain on suicide/homicide/elopement precaution.    - Continue one-on-one supervision.  - legal hold was extended, pending inpatient psychiatric facility for discharge.  Court hearing pending.         VTE prophylaxis:    enoxaparin ppx      I have performed a physical exam and reviewed and updated ROS and Plan today (9/26/2023). In review of yesterday's note (9/25/2023), there are no changes except as documented above.      Total time spent 51 minutes. I spent greater than 50% of the time for patient care, counseling, and coordination on this date, including unit/floor time, and face-to-face time with the patient as per interval events, my own review of patient's imaging and lab analysis and developing my assessment and plan above.

## 2023-09-26 NOTE — CARE PLAN
The patient is Stable - Low risk of patient condition declining or worsening    Shift Goals  Clinical Goals: Safety  Patient Goals: behave and get discharge  Family Goals: n/a    Progress made toward(s) clinical / shift goals:  no bouts of aggressiveness or behavioral issues during this shift    Patient is not progressing towards the following goals:

## 2023-09-26 NOTE — CARE PLAN
The patient is Stable - Low risk of patient condition declining or worsening    Shift Goals  Clinical Goals: Safety  Patient Goals: Comfort  Family Goals: n/a    Progress made toward(s) clinical / shift goals:    Problem: Knowledge Deficit - Standard  Goal: Patient and family/care givers will demonstrate understanding of plan of care, disease process/condition, diagnostic tests and medications  9/26/2023 0619 by Deepak Vanegas R.N.  Outcome: Progressing  9/25/2023 2125 by Deepak Vanegas R.N.  Outcome: Progressing     Problem: Skin Integrity  Goal: Skin integrity is maintained or improved  Outcome: Progressing     Problem: Fall Risk  Goal: Patient will remain free from falls  9/26/2023 0619 by Deepak Vanegas R.N.  Outcome: Progressing  9/25/2023 2125 by Deepak Vanegas R.N.  Outcome: Progressing     Problem: Safety - Medical Restraint  Goal: Remains free of injury from restraints (Restraint for Interference with Medical Device)  9/26/2023 0619 by SCOTTIE ValdovinosN.  Outcome: Progressing  9/25/2023 2125 by Deepak Vanegas R.N.  Outcome: Progressing     Problem: Pain - Standard  Goal: Alleviation of pain or a reduction in pain to the patient’s comfort goal  Outcome: Progressing       Patient is not progressing towards the following goals:

## 2023-09-26 NOTE — DISCHARGE PLANNING
Legal Hold    Referral: Legal Hold Court     Intervention: Pt presented for legal hold meeting with  via video conferencing.  advised pt will meet with court MD's via telemedicine monitor to contest the legal hold.      Plan: Pt will present to telemedicine mental health to meet with court physicians 9/27. Will call bedside RN once time has been determined.

## 2023-09-26 NOTE — PROGRESS NOTES
Patient walking in the room asking for water, calm and cooperative. Denies any complains of pain at this time. Verbalized wanting to talk to case management regarding his court date in [place], CM informed. All needs met at this time.

## 2023-09-26 NOTE — DISCHARGE PLANNING
Alert Team Note:    Contacted by [facility], spoke to Reva. Pt is on the pending list. No bed availability at this time.

## 2023-09-26 NOTE — CARE PLAN
The patient is Stable - Low risk of patient condition declining or worsening    Shift Goals  Clinical Goals: Safety  Patient Goals: Comfort  Family Goals: n/a    Progress made toward(s) clinical / shift goals:    Problem: Knowledge Deficit - Standard  Goal: Patient and family/care givers will demonstrate understanding of plan of care, disease process/condition, diagnostic tests and medications  Outcome: Progressing     Problem: Skin Integrity  Goal: Skin integrity is maintained or improved  Outcome: Progressing     Problem: Fall Risk  Goal: Patient will remain free from falls  Outcome: Progressing     Problem: Safety - Medical Restraint  Goal: Remains free of injury from restraints (Restraint for Interference with Medical Device)  Outcome: Progressing     Problem: Pain - Standard  Goal: Alleviation of pain or a reduction in pain to the patient’s comfort goal  Outcome: Progressing       Patient is not progressing towards the following goals:

## 2023-09-27 LAB
ALBUMIN SERPL BCP-MCNC: 4.3 G/DL (ref 3.2–4.9)
BUN SERPL-MCNC: 15 MG/DL (ref 8–22)
CALCIUM ALBUM COR SERPL-MCNC: 9.6 MG/DL (ref 8.5–10.5)
CALCIUM SERPL-MCNC: 9.8 MG/DL (ref 8.4–10.2)
CHLORIDE SERPL-SCNC: 101 MMOL/L (ref 96–112)
CO2 SERPL-SCNC: 27 MMOL/L (ref 20–33)
CREAT SERPL-MCNC: 1.15 MG/DL (ref 0.5–1.4)
ERYTHROCYTE [DISTWIDTH] IN BLOOD BY AUTOMATED COUNT: 43 FL (ref 35.9–50)
GFR SERPLBLD CREATININE-BSD FMLA CKD-EPI: 84 ML/MIN/1.73 M 2
GLUCOSE SERPL-MCNC: 114 MG/DL (ref 65–99)
HCT VFR BLD AUTO: 45.5 % (ref 42–52)
HGB BLD-MCNC: 15.2 G/DL (ref 14–18)
MAGNESIUM SERPL-MCNC: 2.1 MG/DL (ref 1.5–2.5)
MCH RBC QN AUTO: 32.1 PG (ref 27–33)
MCHC RBC AUTO-ENTMCNC: 33.4 G/DL (ref 32.3–36.5)
MCV RBC AUTO: 96.2 FL (ref 81.4–97.8)
PHOSPHATE SERPL-MCNC: 3.6 MG/DL (ref 2.5–4.5)
PLATELET # BLD AUTO: 311 K/UL (ref 164–446)
PMV BLD AUTO: 8.5 FL (ref 9–12.9)
POTASSIUM SERPL-SCNC: 4.5 MMOL/L (ref 3.6–5.5)
RBC # BLD AUTO: 4.73 M/UL (ref 4.7–6.1)
SODIUM SERPL-SCNC: 137 MMOL/L (ref 135–145)
WBC # BLD AUTO: 9.1 K/UL (ref 4.8–10.8)

## 2023-09-27 PROCEDURE — A9270 NON-COVERED ITEM OR SERVICE: HCPCS | Performed by: INTERNAL MEDICINE

## 2023-09-27 PROCEDURE — 80069 RENAL FUNCTION PANEL: CPT

## 2023-09-27 PROCEDURE — 85027 COMPLETE CBC AUTOMATED: CPT

## 2023-09-27 PROCEDURE — 36415 COLL VENOUS BLD VENIPUNCTURE: CPT

## 2023-09-27 PROCEDURE — 770001 HCHG ROOM/CARE - MED/SURG/GYN PRIV*

## 2023-09-27 PROCEDURE — 700102 HCHG RX REV CODE 250 W/ 637 OVERRIDE(OP): Performed by: INTERNAL MEDICINE

## 2023-09-27 PROCEDURE — A9270 NON-COVERED ITEM OR SERVICE: HCPCS | Performed by: STUDENT IN AN ORGANIZED HEALTH CARE EDUCATION/TRAINING PROGRAM

## 2023-09-27 PROCEDURE — 83735 ASSAY OF MAGNESIUM: CPT

## 2023-09-27 PROCEDURE — 700102 HCHG RX REV CODE 250 W/ 637 OVERRIDE(OP): Performed by: STUDENT IN AN ORGANIZED HEALTH CARE EDUCATION/TRAINING PROGRAM

## 2023-09-27 PROCEDURE — 99233 SBSQ HOSP IP/OBS HIGH 50: CPT | Performed by: INTERNAL MEDICINE

## 2023-09-27 RX ADMIN — LITHIUM CARBONATE 600 MG: 300 TABLET, EXTENDED RELEASE ORAL at 20:09

## 2023-09-27 RX ADMIN — DIAZEPAM 5 MG: 5 TABLET ORAL at 07:38

## 2023-09-27 RX ADMIN — LITHIUM CARBONATE 450 MG: 450 TABLET, EXTENDED RELEASE ORAL at 06:27

## 2023-09-27 RX ADMIN — NICOTINE POLACRILEX 2 MG: 2 GUM, CHEWING BUCCAL at 11:53

## 2023-09-27 RX ADMIN — HALOPERIDOL 5 MG: 5 TABLET ORAL at 07:38

## 2023-09-27 RX ADMIN — NICOTINE 14 MG: 14 PATCH TRANSDERMAL at 06:26

## 2023-09-27 RX ADMIN — RISPERIDONE 1 MG: 1 TABLET ORAL at 06:27

## 2023-09-27 ASSESSMENT — ENCOUNTER SYMPTOMS
COUGH: 0
BACK PAIN: 0
ABDOMINAL PAIN: 0
DEPRESSION: 0
HALLUCINATIONS: 0
SHORTNESS OF BREATH: 0
VOMITING: 0
FEVER: 0
HEADACHES: 0
PALPITATIONS: 0
CONSTIPATION: 0
DIARRHEA: 0
NAUSEA: 0
DIZZINESS: 0
CHILLS: 0

## 2023-09-27 ASSESSMENT — PAIN DESCRIPTION - PAIN TYPE
TYPE: ACUTE PAIN
TYPE: ACUTE PAIN

## 2023-09-27 NOTE — DISCHARGE PLANNING
Legal Hold    Referral: Legal Hold Court     Intervention: Pt met with court MD's via telemedicine monitor to contest the legal hold.     Court MD's did not release pt from legal hold. Pt will need to meet with District  9/28 via Ipad at bedside. Once time has been determined will notify DINO Cerda to coordinate Zoom call with Judge Lang.

## 2023-09-27 NOTE — PROGRESS NOTES
Received bedside report from day shift ASHUTOSH Galdamez at 19:05. Assumed pt care.   Pt seen AO4, ambulating around room.   No pain and nausea reported at this time.   POC and goals discussed. Room checked.  Safety and fall precautions in place. Security as safety sitter at bedside. Bed locked and lowest position.  No other needs reported at this time.   Implementation of POC in progress.

## 2023-09-27 NOTE — CARE PLAN
"Late entry:    The patient is Stable - Low risk of patient condition declining or worsening    Shift Goals  Clinical Goals: Maintain safety throughout this shift  Patient Goals: \"I want ice cream\"  Family Goals: n/a    Progress made toward(s) clinical / shift goals:      Ice cream provided per pts request.  Safety sitter maintained throughout this shift. Pt safety maintained overnight.    Patient is not progressing towards the following goals:    Problem: Knowledge Deficit - Standard  Goal: Patient and family/care givers will demonstrate understanding of plan of care, disease process/condition, diagnostic tests and medications  Outcome: Not Progressing         "

## 2023-09-27 NOTE — CONSULTS
"[facility]  PSYCHIATRIC CONSULTATION - Follow-up  Established Patient    DOS: 09/27/23     Reason for Admission:   reported taking 15-20 pills of seroquel. Was combative. He had a prescription bottle with him that was filled 3 days ago that was empty per report.  Police were called as the patient was reported as trespassing and when he was in the police car patient started screaming that he was suicidal and that he overdosed on Seroquel and EMS was called and the patient requested to go to a different facility other than [facility]. The most he could have taken was around 1,250 mg.    Legal Hold Status: on legal hold - court       CC:   Chief Complaint   Patient presents with    ALOC           Drug Overdose    Legal 2000     Patient BIB EMS with legal hold initiated by [employer]. Patient reports taking 15-20 pills of Seraquil. Patient combative in triage, arrived in four point restraints by EMS.                S:   Observed in bed, sleeping, easy to wake. Reports difficulty getting to sleep, decreased sleep, energy; adequate appetite, mood. Denies SI/HI, no Sx of psychosis/ceci reported or observed. Minimally engaged in assessment, reports feeling slowed down, lethargic despite sleeping most of the day, states \"I'd rather be manic than retarded,\" refused haldol on 1 occasion, education provided r/t anticipated course of treatment, legal hold process. Observed with slow speech, lethargic presentation. Clearer thinking, able to discuss topics from previous encounter. Patient terminated the encounter stating \"you're not even a real doctor, you can't release me, could you leave please.\"    O:   Medical ROS (as pertinent):   Recent Labs     09/27/23  0740   WBC 9.1   RBC 4.73   HEMOGLOBIN 15.2   HEMATOCRIT 45.5   MCV 96.2   MCH 32.1   RDW 43.0   PLATELETCT 311   MPV 8.5*     Recent Labs     09/27/23  0740   SODIUM 137   POTASSIUM 4.5   CHLORIDE 101   CO2 27   GLUCOSE 114*   BUN 15     Recent Labs "     23  0740   ALBUMIN 4.3   CREATININE 1.15       EKG:   Results for orders placed or performed during the hospital encounter of 09/15/23   EKG (NOW)   Result Value Ref Range    Report       [facility] Emergency Dept.    Test Date:  2023-09-15  Pt Name:    MARTY CHRISTIANSON             Department: [facility]  MRN:        [id number]                      Room:       [facility] 6  Gender:     Male                         Technician: [id number]  :        1985                   Requested By:FELICITY RUIZ  Order #:    [id number]                    Reading MD: Felicity Ruiz    Measurements  Intervals                                Axis  Rate:       104                          P:          55  PA:         138                          QRS:        57  QRSD:       87                           T:          41  QT:         338  QTc:        445    Interpretive Statements  Sinus tachycardia  Borderline low voltage, extremity leads  Baseline wander in lead(s) I,II,aVR,V1,V2,V3,V6  No previous ECG available for comparison  Electronically Signed On 2023 00:00:19 PDT by Felicity Ruiz     EKG (NOW)   Result Value Ref Range    Report       [facility] Emergency Dept.    Test Date:  2023  Pt Name:    MARTY CHRISTIANSON             Department: EDS  MRN:        [id number]                      Room:       [facility] 6  Gender:     Male                         Technician: [id number]  :        1985                   Requested By:FELICITY RUIZ  Order #:    [id number]                    Reading MD: Felicity Ruiz    Measurements  Intervals                                Axis  Rate:       94                           P:          66  PA:         141                          QRS:        56  QRSD:       79                           T:          66  QT:         361  QTc:        452    Interpretive Statements  Sinus rhythm  Probable left atrial enlargement  Baseline wander in  lead(s) V2  Compared to ECG 09/15/2023 23:52:48  Sinus tachycardia no longer present  Electronically Signed On 2023 04:35:24 PDT by Geovanni Briseno     EKG   Result Value Ref Range    Report       [facility]    Test Date:  2023  Pt Name:    MARTY CHRISTIANSON             Department: [facility]  MRN:        [id number]                      Room:       [facility]  Gender:     Male                         Technician: [id number]  :        1985                   Requested By:TIMOTHY CARTER  Order #:    [id number]                    Reading MD: Lynn Cervantes    Measurements  Intervals                                Axis  Rate:       49                           P:          47  KY:         142                          QRS:        45  QRSD:       90                           T:          68  QT:         483  QTc:        437    Interpretive Statements  Sinus bradycardia, 49 bpm  Compared to ECG 2023 04:20:02  Rate is slower and does not meet criteria for left atrial enlargement  Electronically Signed On 2023 06:19:08 PDT by Lynn Cervantes     EKG   Result Value Ref Range    Report       [facility]    Test Date:  2023  Pt Name:    MARTY CHRISTIANSON             Department: [facility]  MRN:        [id number]                      Room:       [facility]  Gender:     Male                         Technician: [id number]  :        1985                   Requested By:TIMOTHY CARTER  Order #:    [id number]                    Reading MD: Lynn Cervantes    Measurements  Intervals                                Axis  Rate:       50                           P:          49  KY:         143                          QRS:        68  QRSD:       90                           T:          72  QT:         492  QTc:        449    Interpretive Statements  Sinus bradycardia, 50 bpm  Compared to ECG 2023 18:33:05  No significant changes  Electronically Signed On 2023 06:33:22 PDT by Lynn Cervatnes     EKG   Result Value Ref Range     Report       [facility]    Test Date:  2023  Pt Name:    MARTY CHRISTIANSON             Department: ICU  MRN:        [id number]                      Room:       3321  Gender:     Male                         Technician: [id number]  :        1985                   Requested By:TIMOTHY CARTER  Order #:    [id number]                    Reading MD: Lynn Cervantes    Measurements  Intervals                                Axis  Rate:       53                           P:          57  TX:         139                          QRS:        64  QRSD:       83                           T:          64  QT:         467  QTc:        439    Interpretive Statements  Sinus rhythm, 53 bpm  Compared to ECG 2023 22:46:04  No significant changes  Electronically Signed On 2023 06:40:52 PDT by Lynn Cervantes     EKG   Result Value Ref Range    Report       [facility]    Test Date:  2023  Pt Name:    MARTY CHRISTIANSON             Department: ICU  MRN:        [id number]                      Room:       [facility]  Gender:     Male                         Technician: [id number]  :        1985                   Requested By:TIMOTHY CARTER  Order #:    [id number]                    Reading MD: Kiana Velez MD    Measurements  Intervals                                Axis  Rate:       50                           P:          35  TX:         134                          QRS:        45  QRSD:       87                           T:          41  QT:         463  QTc:        423    Interpretive Statements  Incomplete analysis due to missing data in precordial lead(s)  Sinus bradycardia  Missing lead(s): V6  Compared to ECG 2023 03:23:15  Sinus rhythm no longer present  Electronically Signed On 2023 07:21:57 PDT by Kiana Velez MD     EKG (IP)   Result Value Ref Range    Report       [facility]    Test Date:  2023  Pt Name:    MARTY CHRISTIANSON             Department: [facility]  MRN:         [id number]                      Room:       2212  Gender:     Male                         Technician: [id number]  :        1985                   Requested By:ATIYA  ROSALEE  Order #:    [id number]                    Reading MD: Geovanni Young MD    Measurements  Intervals                                Axis  Rate:       86                           P:          53  NH:         157                          QRS:        59  QRSD:       86                           T:          40  QT:         361  QTc:        432    Interpretive Statements  Sinus rhythm  Compared to ECG 2023 11:22:33  Sinus bradycardia no longer present  Electronically Signed On 2023 23:31:19 PDT by Geovanni Young MD          MSE:   /87   Pulse 96   Temp 36.4 °C (97.5 °F) (Temporal)   Resp 18   Ht 1.803 m (5' 11\")   Wt 97.5 kg (214 lb 15.2 oz)   SpO2 100%     Constitutional: as noted above  General Appearance/Behavior: 38 y.o. appears muscular intermittent eye contact indifferent, Poor impulse control  Abnormal Movements: none, no PMA/PMR or tremor observed.  Gait and Posture: not observed  Musculoskeletal: as noted above  Mood: \"slow\"  Affect: Blunt   Speech: quiet  Language:  spontaneous, comprehends spoken commands, and fluent   Thought Process: Goal Directed and Perseverative, Future Oriented  Thought Content: Denies SI/HI, AH/VH  Insight/Judgement:  unable to assess  Alert/Orientation: alert, oriented to person, place and time  Attn/Concentration: short attention span  MMSE: deferred this visit     Medications:  Scheduled Medications   Medication Dose Frequency    lithium carbonate ER  600 mg Nightly    lithium carbonate ER  450 mg DAILY    diazePAM  5 mg TID    haloperidol  5 mg TID    risperiDONE  2 mg Q EVENING    risperiDONE  1 mg BID    nicotine  1 Patch Daily-0600    enoxaparin (LOVENOX) injection  40 mg DAILY AT 1800       Allergies:   No Known Allergies     Assessment:   Diagnosis:   1. Intentional overdose, " initial encounter (HCC) Acute   2. Psychosis, unspecified psychosis type (HCC) Acute   3. Polysubstance abuse (HCC) Acute   4. Suicidal ideation Acute   5. Non-compliance Acute   6. Methamphetamine abuse (HCC) Acute        Schizoaffective disorder, bipolar type, current hypomania  Stimulant use disorder - meth  Cannabis use      Medical: as noted by the medical treatment team.      Recommendations:  Legal Status: on legal hold - court     Please transfer patient to inpatient psychiatric hospital when medically cleared and bed is available.     Observation status:   - Line of site with sitter     Phone: Yes - Okay to use at nursing availability/discretion.   Visitors: No   Personal belongings: No      Medication Recommendations: Final orders as per Treatment Team  No new medication recommendations; continue Lithium 450 QAM, Lithium 600 QHS, Haldol 5mg TID, Risperidone 1mg BID, 2mg QHS  Risks/benefits/side effects discussed, patient verbalized understanding.  Medication reconciliation was completed.        Reviewed safety plan: 911, ER, PCM, MHC, suicide crisis line, nursing staff while inpatient.     Will Continue to Follow. Thank you for the consult.

## 2023-09-27 NOTE — DISCHARGE PLANNING
Alert Team Note:    Faxed updated MAR, hold extension, progress and psych notes to [facility] as requested by Reva.

## 2023-09-27 NOTE — DISCHARGE PLANNING
Spoke to  Bryn regarding patient's meeting with the court doctors today. Patient has been scheduled to meet with court doctors via Ipad at bedside today at 1100. Contacted LSW Zaida and let her know of upcoming court meeting. LSW to coordinate Zoom call between patient and court doctors.

## 2023-09-27 NOTE — DISCHARGE PLANNING
Case Management Discharge Planning    Admission Date: 9/15/2023  GMLOS: 3.6  ALOS: 11    6-Clicks ADL Score: 24  6-Clicks Mobility Score: 24      Anticipated Discharge Dispo: Discharge Disposition: D/T to psych hosp or distinct part unit (65)    DME Needed: No    Action(s) Taken: Updated Provider/Nurse on Discharge Plan    Patient discussed during morning IDT rounds with team. Patient is not medically cleared for discharge at this time.  Court evaluation scheduled today at  11am and assisted patient with the use of the IPAD. A court meeting is scheduled again tomorrow 9/28/2023 to determine discharge recommendations.     SWCM will remain available and continue to assist with safe disposition.   Escalations Completed: None    Medically Clear: No    Next Steps: pending medical clearance and  court hearing     Barriers to Discharge: Medical clearance    Is the patient up for discharge tomorrow: No

## 2023-09-27 NOTE — CARE PLAN
"The patient is Stable - Low risk of patient condition declining or worsening    Shift Goals  Clinical Goals: Remain free from injury or falls throughout the shift  Patient Goals: \"I want ice cream\"  Family Goals: n/a    Progress made toward(s) clinical / shift goals:  Fall precaution in place. No falls or injury throughout the shift. Hourly rounding in place. Security office at bedside.    Patient is not progressing towards the following goals:      Problem: Fall Risk  Goal: Patient will remain free from falls  Outcome: Progressing     Problem: Psychosocial  Goal: Patient's level of anxiety will decrease  Outcome: Progressing     "

## 2023-09-27 NOTE — PROGRESS NOTES
Hospital Medicine Daily Progress Note    Date of Service  9/27/2023    Chief Complaint  Peter Grace is a 38 y.o. male admitted 9/15/2023 with   Chief Complaint   Patient presents with    ALOC           Drug Overdose    Legal 2000     Patient BIB EMS with legal hold initiated by [employer]. Patient reports taking 15-20 pills of Seraquil. Patient combative in triage, arrived in four point restraints by EMS.      Hospital Course  No notes on file    Interval Problem Update  9/26:  Patient had no acute complaints.  - I reviewed psych notes, lithium was increased, will need to continue monitoring levels. Last lithium level was 0.7.  - legal hold was extended, pending inpatient psychiatric facility for discharge.  - I ordered ECG to recheck Qtc    9/27:  -I reviewed EKG, QTc appropriate for medications.  - I reviewed psychiatry notes, legal hold not discontinued.  We are pending court date.  We will continue current psychiatric medications.  - Continue sitter  -I reviewed labs, potassium 4.5, creatinine 1.15, magnesium 2.1.  Sodium 137.    I have discussed this patient's plan of care and discharge plan at IDT rounds today with Case Management, Nursing, Nursing leadership, and other members of the IDT team.    Consultants/Specialty  critical care and psychiatry    Code Status  Full Code    Disposition  The patient is medically cleared for discharge to home or a post-acute facility.  Anticipate discharge to: a psychiatric hospital    I have placed the appropriate orders for post-discharge needs.    Review of Systems  Review of Systems   Constitutional:  Negative for chills, fever and malaise/fatigue.   Respiratory:  Negative for cough and shortness of breath.    Cardiovascular:  Negative for chest pain and palpitations.   Gastrointestinal:  Negative for abdominal pain, constipation, diarrhea, nausea and vomiting.   Musculoskeletal:  Negative for back pain and joint pain.   Neurological:  Negative for dizziness and  headaches.   Psychiatric/Behavioral:  Negative for depression, hallucinations and suicidal ideas.    All other systems reviewed and are negative.       Physical Exam  Temp:  [36.4 °C (97.5 °F)-36.9 °C (98.4 °F)] 36.4 °C (97.5 °F)  Pulse:  [] 96  Resp:  [18] 18  BP: (120-142)/(76-87) 121/87  SpO2:  [96 %-100 %] 100 %    Physical Exam  Vitals and nursing note reviewed.   Constitutional:       General: He is not in acute distress.     Appearance: He is not diaphoretic.   HENT:      Mouth/Throat:      Mouth: Mucous membranes are dry.      Pharynx: No oropharyngeal exudate.   Cardiovascular:      Rate and Rhythm: Normal rate and regular rhythm.      Pulses: Normal pulses.      Heart sounds: Normal heart sounds. No murmur heard.  Pulmonary:      Effort: Pulmonary effort is normal. No respiratory distress.      Breath sounds: Normal breath sounds. No wheezing or rales.   Abdominal:      General: Bowel sounds are normal. There is no distension.      Tenderness: There is no abdominal tenderness.   Musculoskeletal:         General: No swelling or tenderness. Normal range of motion.   Skin:     General: Skin is warm.      Capillary Refill: Capillary refill takes less than 2 seconds.      Coloration: Skin is not jaundiced or pale.   Neurological:      General: No focal deficit present.      Mental Status: He is alert and oriented to person, place, and time. Mental status is at baseline.      Motor: No weakness.   Psychiatric:         Mood and Affect: Mood normal.         Behavior: Behavior normal.         Fluids    Intake/Output Summary (Last 24 hours) at 9/27/2023 1017  Last data filed at 9/27/2023 0600  Gross per 24 hour   Intake 1540 ml   Output --   Net 1540 ml       Laboratory  Recent Labs     09/27/23  0740   WBC 9.1   RBC 4.73   HEMOGLOBIN 15.2   HEMATOCRIT 45.5   MCV 96.2   MCH 32.1   MCHC 33.4   RDW 43.0   PLATELETCT 311   MPV 8.5*     Recent Labs     09/27/23  0740   SODIUM 137   POTASSIUM 4.5   CHLORIDE 101    CO2 27   GLUCOSE 114*   BUN 15   CREATININE 1.15   CALCIUM 9.8                   Imaging  No orders to display        Assessment/Plan  * Toxic encephalopathy- (present on admission)  Assessment & Plan  - Due to intentional drug overdose in an apparent suicide attempt.    Had to be temporarily placed on Precedex drip to control behavior. Downgraded on 9/24.    -Continue lithium 450mg + 600mg.    Continue Valium 5mg TID  Contiue haldol 5mg TID  Continue risperdal 1mg BID and 2mg QHS  continue Geodon PRN  -I reviewed EKG, QTc 432ms appropriate for medications.  -I reviewed labs, potassium 4.5, creatinine 1.15, magnesium 2.1.  Sodium 137.    Bipolar affective (HCC)- (present on admission)  Assessment & Plan  - continue lithium, risperdal, haldol and Valium  - pt has a flat affect    Amphetamine abuse (HCC)- (present on admission)  Assessment & Plan  UDS positive    Rhabdomyolysis- (present on admission)  Assessment & Plan  - may have been due to agitation, but seroquel overdose may cause  - 1729 on admission, last check was 171  - pt moving/ambulating without pain    Leukocytosis- (present on admission)  Assessment & Plan  - Likely reactive.  Resolved.  No signs of infection.  Holding off on antibiotics.    Intentional overdose (HCC)- (present on admission)  Assessment & Plan  - In an apparent suicide attempt.  -Cardiac status stable.  -Maintain on suicide/homicide/elopement precaution.    - Continue one-on-one supervision.  - legal hold was extended, pending inpatient psychiatric facility for discharge.    Court hearing today at 11AM via [employer]         VTE prophylaxis:    enoxaparin ppx      I have performed a physical exam and reviewed and updated ROS and Plan today (9/27/2023). In review of yesterday's note (9/26/2023), there are no changes except as documented above.      Total time spent 52 minutes.    This included my review of patient's overnight RN notes, face to face interview, physical examination, lab  analysis.  Also includes my documented assessments and interventions above.  I spoke with specialist psychiatry.  In addition, I spoke with entire care team on patient's treatment plan and DC planning on IDT rounds.

## 2023-09-27 NOTE — PROGRESS NOTES
"01:35 Phlebotomist, Adia Aguilar informs this RN pt refused lab draw as he is \"Trying to sleep\". This RN requested phleb to come back at hrs 0600 to attempt draw when pt is awake.   "

## 2023-09-27 NOTE — DISCHARGE PLANNING
Alert Team Note:    Contacted by [facility], spoke to Hanna. Pt has been declined due to previous hx of aggression.     Contacted [facility], spoke to Reva. Pt is on the pending list. Facility not accepting admissions today.

## 2023-09-27 NOTE — PROGRESS NOTES
Received bedside report from night shift RN.  Assumed pt care.   Assessment and chart check complete.  Pt is AA0X4, respirations even and unlabored, no signs of distress, denies nausea/vomiting.  Updated for the  POC of the day.  Security personnel at bedside.  Safety checklist completed.  Fall precautions in place, treaded socks on pt, bed in low position.   Call light within reach.   Educated pt to call if needing anything.   Hourly rounding.     Education provided on importance of vitals and medication, continues to refuse. Patient would like to rest for now.

## 2023-09-28 PROCEDURE — 700102 HCHG RX REV CODE 250 W/ 637 OVERRIDE(OP): Performed by: STUDENT IN AN ORGANIZED HEALTH CARE EDUCATION/TRAINING PROGRAM

## 2023-09-28 PROCEDURE — 700102 HCHG RX REV CODE 250 W/ 637 OVERRIDE(OP): Performed by: INTERNAL MEDICINE

## 2023-09-28 PROCEDURE — 770001 HCHG ROOM/CARE - MED/SURG/GYN PRIV*

## 2023-09-28 PROCEDURE — A9270 NON-COVERED ITEM OR SERVICE: HCPCS | Performed by: INTERNAL MEDICINE

## 2023-09-28 PROCEDURE — 94760 N-INVAS EAR/PLS OXIMETRY 1: CPT

## 2023-09-28 PROCEDURE — 99233 SBSQ HOSP IP/OBS HIGH 50: CPT | Performed by: INTERNAL MEDICINE

## 2023-09-28 PROCEDURE — A9270 NON-COVERED ITEM OR SERVICE: HCPCS | Performed by: STUDENT IN AN ORGANIZED HEALTH CARE EDUCATION/TRAINING PROGRAM

## 2023-09-28 RX ORDER — DIAZEPAM 5 MG/1
5 TABLET ORAL EVERY 8 HOURS PRN
Status: DISCONTINUED | OUTPATIENT
Start: 2023-09-28 | End: 2023-10-06 | Stop reason: HOSPADM

## 2023-09-28 RX ADMIN — NICOTINE 14 MG: 14 PATCH TRANSDERMAL at 06:25

## 2023-09-28 RX ADMIN — NICOTINE POLACRILEX 2 MG: 2 GUM, CHEWING BUCCAL at 19:27

## 2023-09-28 RX ADMIN — NICOTINE POLACRILEX 2 MG: 2 GUM, CHEWING BUCCAL at 10:30

## 2023-09-28 RX ADMIN — BISACODYL 5 MG: 5 TABLET, COATED ORAL at 07:27

## 2023-09-28 RX ADMIN — LITHIUM CARBONATE 600 MG: 300 TABLET, EXTENDED RELEASE ORAL at 20:16

## 2023-09-28 RX ADMIN — DIAZEPAM 5 MG: 5 TABLET ORAL at 19:42

## 2023-09-28 RX ADMIN — DIAZEPAM 5 MG: 5 TABLET ORAL at 14:22

## 2023-09-28 RX ADMIN — LITHIUM CARBONATE 450 MG: 450 TABLET, EXTENDED RELEASE ORAL at 06:25

## 2023-09-28 RX ADMIN — NICOTINE POLACRILEX 2 MG: 2 GUM, CHEWING BUCCAL at 14:21

## 2023-09-28 ASSESSMENT — ENCOUNTER SYMPTOMS
VOMITING: 0
COUGH: 0
NAUSEA: 0
SHORTNESS OF BREATH: 0
BACK PAIN: 0
DEPRESSION: 0
PALPITATIONS: 0
HALLUCINATIONS: 0
DIZZINESS: 0
CHILLS: 0
FEVER: 0
CONSTIPATION: 0
DIARRHEA: 0
ABDOMINAL PAIN: 0
HEADACHES: 0

## 2023-09-28 ASSESSMENT — PAIN DESCRIPTION - PAIN TYPE
TYPE: ACUTE PAIN
TYPE: ACUTE PAIN

## 2023-09-28 NOTE — PROGRESS NOTES
Received bedside report from day shift RN at 19:15.   Assumed pt care. Pt seen AO4, ambulating around room. No pain and  nausea reported. Security as safety sitter in place. Room safety check done. Whiteboard updated.  Safety and fall precautions in place. Bed locked and lowest position.  No other needs reported at this time.   Implementation of POC in progress.

## 2023-09-28 NOTE — PROGRESS NOTES
"Pt reported to this RN he refuses to take his medications except for his lithium. Encouraged pt to take his meds, provided education on importance of taking medication, per pt \"No, it makes me sleepy\".     2300 CNA reports to this RN pt refusing to have vitals taken  "

## 2023-09-28 NOTE — DISCHARGE PLANNING
Alert Team Note:    Submitted updated referral to [facility].  Per [facility], [facility] has no bed availability at this time.

## 2023-09-28 NOTE — DISCHARGE PLANNING
Pt met with  Precious for court hearing regarding legal hold being released or not. Legal hold was not released. Pt has been court committed to a mental health facility. Once court ordered commitment is received will scan into pt's chart.

## 2023-09-28 NOTE — DISCHARGE PLANNING
Case Management Discharge Planning    Admission Date: 9/15/2023  GMLOS: 3.6  ALOS: 12    6-Clicks ADL Score: 24  6-Clicks Mobility Score: 24      Anticipated Discharge Dispo: Discharge Disposition: D/T to psych hosp or distinct part unit (65)    DME Needed: No    Action(s) Taken: Updated Provider/Nurse on Discharge Plan    Patient discussed during morning IDT rounds with team. Patient is medically cleared for discharge at this time.  Court hearing today at 9:30 am SW assisted  patient with using the IPAD for court hearing. Court recommendations for patient to transfer to a mental health facility.  ALERT team is notified and assisting with referrals.    [facility] to continue to collaborate with ALERT team/ Medical team to assist with disposition.    Escalations Completed: None    Medically Clear: No    Next Steps: pending inpatient psych facility placement    Barriers to Discharge: Pending Placement    Is the patient up for discharge tomorrow: No

## 2023-09-28 NOTE — DISCHARGE PLANNING
Alert Team Note:    Contacted by [facility], spoke to Reva. Pt has been declined. Dr. Mejia feels this pt is not appropriate for current milieu, and facility has no private rooms at this time.     Referral to be sent to [facility] once pt is medically clear.

## 2023-09-28 NOTE — CONSULTS
"[facility]  PSYCHIATRIC CONSULTATION - Follow-up  Established Patient    DOS: 09/28/23     Reason for Admission:   reported taking 15-20 pills of seroquel. Was combative. He had a prescription bottle with him that was filled 3 days ago that was empty per report.  Police were called as the patient was reported as trespassing and when he was in the police car patient started screaming that he was suicidal and that he overdosed on Seroquel and EMS was called and the patient requested to go to a different facility other than [facility]. The most he could have taken was around 1,250 mg.    Legal Hold Status: on legal hold - court    CC:   Chief Complaint   Patient presents with    ALOC           Drug Overdose    Legal 2000     Patient BIB EMS with legal hold initiated by [employer]. Patient reports taking 15-20 pills of Seraquil. Patient combative in triage, arrived in four point restraints by EMS.                S:   Observed in room, awake, cooperative. Reports adequate sleep, energy, appetite. Reports improved mood, denies SI/HI, no Sx of psychosis/ceci reported or observed. Observed with delayed speech at times, states medications are causing sedation that is too much to deal with. States difficult to think, speak, periods of increased energy, clear speech, rapid movement. Denies GI distress, HA, dizziness, appears to be tolerating medications. Encouraged to take antipsychotic medication Risperdal, patient states \"Risperdal is the worst offender,\" r/t speech, thought delay, additionally reports haldol is troublesome \"I stopped taking that years ago,\" vocalizing efficacy for Lithium only. Currently focused on d/c, does not feel he will be released today. Inquiring about how to be released from hold by psychiatry team, education provided r/t Bx, medication adherence, patient states \"let's see what the court says.\"    O:   Medical ROS (as pertinent):   Recent Labs     09/27/23  0740   WBC 9.1   RBC " 4.73   HEMOGLOBIN 15.2   HEMATOCRIT 45.5   MCV 96.2   MCH 32.1   RDW 43.0   PLATELETCT 311   MPV 8.5*     Recent Labs     23  0740   SODIUM 137   POTASSIUM 4.5   CHLORIDE 101   CO2 27   GLUCOSE 114*   BUN 15     Recent Labs     23  0740   ALBUMIN 4.3   CREATININE 1.15       EKG:   Results for orders placed or performed during the hospital encounter of 09/15/23   EKG (NOW)   Result Value Ref Range    Report       [facility]    Test Date:  2023-09-15  Pt Name:    MARTY CHRISTIANSON             Department: EDS  MRN:        [id number]                      Room:       [place] [address]  Gender:     Male                         Technician: [id number]  :        1985                   Requested By:FELICITY RUIZ  Order #:    [id number]                    Reading MD: Felicity Ruiz    Measurements  Intervals                                Axis  Rate:       104                          P:          55  TX:         138                          QRS:        57  QRSD:       87                           T:          41  QT:         338  QTc:        445    Interpretive Statements  Sinus tachycardia  Borderline low voltage, extremity leads  Baseline wander in lead(s) I,II,aVR,V1,V2,V3,V6  No previous ECG available for comparison  Electronically Signed On 2023 00:00:19 PDT by Felicity Ruiz     EKG (NOW)   Result Value Ref Range    Report       [facility]    Test Date:  2023  Pt Name:    MARTY CHRISTIANSON             Department: EDS  MRN:        [id number]                      Room:       [place] [address]  Gender:     Male                         Technician: [id number]  :        1985                   Requested By:FELICITY RUIZ  Order #:    [id number]                    Reading MD: Felicity Ruiz    Measurements  Intervals                                Axis  Rate:       94                           P:          66  TX:          141                          QRS:        56  QRSD:       79                           T:          66  QT:         361  QTc:        452    Interpretive Statements  Sinus rhythm  Probable left atrial enlargement  Baseline wander in lead(s) V2  Compared to ECG 09/15/2023 23:52:48  Sinus tachycardia no longer present  Electronically Signed On 2023 04:35:24 PDT by Geovanni Briseno     EKG   Result Value Ref Range    Report       [facility]    Test Date:  2023  Pt Name:    MARTY CHRISTIANSON             Department: EDS  MRN:        [id number]                      Room:       [facility]  Gender:     Male                         Technician: [id number]  :        1985                   Requested By:TIMOTHY CARTER  Order #:    [id number]                    Reading MD: Lynn Cervantes    Measurements  Intervals                                Axis  Rate:       49                           P:          47  DC:         142                          QRS:        45  QRSD:       90                           T:          68  QT:         483  QTc:        437    Interpretive Statements  Sinus bradycardia, 49 bpm  Compared to ECG 2023 04:20:02  Rate is slower and does not meet criteria for left atrial enlargement  Electronically Signed On 2023 06:19:08 PDT by Lynn Cervantes     EKG   Result Value Ref Range    Report       [facility]    Test Date:  2023  Pt Name:    MARTY CHRISTIANSON             Department: EDS  MRN:        [id number]                      Room:       3321  Gender:     Male                         Technician: [id number]  :        1985                   Requested By:TIMOTHY CARTER  Order #:    [id number]                    Reading MD: Lynn Cervantes    Measurements  Intervals                                Axis  Rate:       50                           P:          49  DC:         143                          QRS:        68  QRSD:       90                           T:          72  QT:          492  QTc:        449    Interpretive Statements  Sinus bradycardia, 50 bpm  Compared to ECG 2023 18:33:05  No significant changes  Electronically Signed On 2023 06:33:22 PDT by Lynn Cervantes     EKG   Result Value Ref Range    Report       [facility]    Test Date:  2023  Pt Name:    MARTY CHRISTIANSON             Department: ICU  MRN:        [id number]                      Room:       [facility]  Gender:     Male                         Technician: [id number]  :        1985                   Requested By:TIMOTHY CARTER  Order #:    [id number]                    Reading MD: Lynn Cervantes    Measurements  Intervals                                Axis  Rate:       53                           P:          57  NH:         139                          QRS:        64  QRSD:       83                           T:          64  QT:         467  QTc:        439    Interpretive Statements  Sinus rhythm, 53 bpm  Compared to ECG 2023 22:46:04  No significant changes  Electronically Signed On 2023 06:40:52 PDT by Lynn Cervantes     EKG   Result Value Ref Range    Report       [facility]    Test Date:  2023  Pt Name:    MARTY CHRISTIANSON             Department: ICU  MRN:        [id number]                      Room:       [facility]  Gender:     Male                         Technician: [id number]  :        1985                   Requested By:TIMOTHY CARTER  Order #:    [id number]                    Reading MD: Kiana Velez MD    Measurements  Intervals                                Axis  Rate:       50                           P:          35  NH:         134                          QRS:        45  QRSD:       87                           T:          41  QT:         463  QTc:        423    Interpretive Statements  Incomplete analysis due to missing data in precordial lead(s)  Sinus bradycardia  Missing lead(s): V6  Compared to ECG 2023 03:23:15  Sinus rhythm no longer  "present  Electronically Signed On 2023 07:21:57 PDT by Kiana Velez MD     EKG (IP)   Result Value Ref Range    Report       [facility]    Test Date:  2023  Pt Name:    MARTY CHRISTIANSON             Department: [facility]  MRN:        [id number]                      Room:       2212  Gender:     Male                         Technician: [id number]  :        1985                   Requested By:RAJPUT  ROSALEE  Order #:    [id number]                    Reading MD: Geovanni Young MD    Measurements  Intervals                                Axis  Rate:       86                           P:          53  WV:         157                          QRS:        59  QRSD:       86                           T:          40  QT:         361  QTc:        432    Interpretive Statements  Sinus rhythm  Compared to ECG 2023 11:22:33  Sinus bradycardia no longer present  Electronically Signed On 2023 23:31:19 PDT by Geovanni Young MD          MSE:   /83   Pulse 95   Temp 36.6 °C (97.9 °F) (Temporal)   Resp 18   Ht 1.803 m (5' 11\")   Wt 97.5 kg (214 lb 15.2 oz)   SpO2 99%     Constitutional: as noted above  General Appearance/Behavior: 38 y.o. appears muscular intermittent eye contact superficially cooperative, Poor impulse control  Abnormal Movements: none, no PMA/PMR or tremor observed.  Gait and Posture: not observed  Musculoskeletal: as noted above  Mood: \"fine\"  Affect: Inappropriate   Speech: normal rate, normal rhythm, normal tone, normal volume, and normal fluency  Language:  spontaneous, comprehends spoken commands, and fluent   Thought Process: Perseverative, Future Oriented  Thought Content: Denies SI/HI, A/VH  Insight/Judgement:  poor  Alert/Orientation: alert, oriented to person, place and time  Attn/Concentration: short attention span  MMSE: deferred this visit     Medications:  Scheduled Medications   Medication Dose Frequency    lithium carbonate ER  600 mg Nightly    lithium " carbonate ER  450 mg DAILY    diazePAM  5 mg TID    haloperidol  5 mg TID    risperiDONE  2 mg Q EVENING    risperiDONE  1 mg BID    nicotine  1 Patch Daily-0600    enoxaparin (LOVENOX) injection  40 mg DAILY AT 1800       Allergies:   No Known Allergies     Assessment:   Diagnosis:   1. Intentional overdose, initial encounter (HCC) Acute   2. Psychosis, unspecified psychosis type (HCC) Acute   3. Polysubstance abuse (HCC) Acute   4. Suicidal ideation Acute   5. Non-compliance Acute   6. Methamphetamine abuse (HCC) Acute        Psychiatric:   Schizoaffective disorder, bipolar type, current hypomania  Stimulant use disorder - meth  Cannabis use     Medical: as noted by the medical treatment team.      Recommendations:  Legal Status: on legal hold - court    Please transfer patient to inpatient psychiatric hospital when medically cleared and bed is available.    Observation status:   - Line of site with sitter    Phone: Yes - Okay to use at nursing availability/discretion.   Visitors: No   Personal belongings: Yes, books okay to use at nursing availability/discretion.     Discussed/voalted: Bertha BOYKIN, TERESA Sauceda MD    Medication Recommendations: Final orders as per Treatment Team  No new medication recommendations; continue Lithium 450 QAM, Lithium 600 QHS, Haldol 5mg TID, Risperidone 1mg BID, 2mg QHS  Risks/benefits/side effects discussed, patient verbalized understanding.  Medication reconciliation was completed.        Reviewed safety plan: 911, ER, PCM, MHC, suicide crisis line, nursing staff while inpatient.     Will Continue to Follow. Thank you for the consult.

## 2023-09-28 NOTE — PROGRESS NOTES
Received bedside report from night shift RN.  Assumed pt care.   Assessment and chart check complete.  Pt is AA0X4, respirations even and unlabored, no signs of distress, denies nausea/vomiting.  Updated for the  POC of the day.  Security personnel at bedside.  Safety checklist completed.  Pt is refusing to take his psych medications MD Salazar aware.Pt educated.  Fall precautions in place, treaded socks on pt, bed in low position.   Call light within reach.   Educated pt to call if needing anything.   Hourly rounding.

## 2023-09-28 NOTE — PROGRESS NOTES
Hospital Medicine Daily Progress Note    Date of Service  9/28/2023    Chief Complaint  Peter Grace is a 38 y.o. male admitted 9/15/2023 with   Chief Complaint   Patient presents with    ALOC           Drug Overdose    Legal 2000     Patient BIB EMS with legal hold initiated by [employer]. Patient reports taking 15-20 pills of Seraquil. Patient combative in triage, arrived in four point restraints by EMS.      Hospital Course  No notes on file    Interval Problem Update  9/26:  Patient had no acute complaints.  - I reviewed psych notes, lithium was increased, will need to continue monitoring levels. Last lithium level was 0.7.  - legal hold was extended, pending inpatient psychiatric facility for discharge.  - I ordered ECG to recheck Qtc    9/27:  -I reviewed EKG, QTc appropriate for medications.  - I reviewed psychiatry notes, legal hold not discontinued.  We are pending court date.  We will continue current psychiatric medications.  - Continue sitter  -I reviewed labs, potassium 4.5, creatinine 1.15, magnesium 2.1.  Sodium 137.    9/28:  Patient did not want to take his medications today other than lithium.  We continue to encourage him to continue his medication as per psychiatry team recommendations, but he feels that it makes him too slow to speak but at this moment he is stuttering.  - Patient met with , legal hold was continued.  We are pending [facility] placement.  Complicated discharge as facility is working at half capacity as per case management.  -I spoke with psychiatry team, we will continue current medications with lithium, Haldol and Risperdal.  I changed Valium to PRN.    I have discussed this patient's plan of care and discharge plan at IDT rounds today with Case Management, Nursing, Nursing leadership, and other members of the IDT team.    Consultants/Specialty  critical care and psychiatry    Code Status  Full Code    Disposition  The patient is medically cleared for discharge to home or  a post-acute facility.  Anticipate discharge to: a psychiatric hospital    I have placed the appropriate orders for post-discharge needs.    Review of Systems  Review of Systems   Constitutional:  Negative for chills, fever and malaise/fatigue.   Respiratory:  Negative for cough and shortness of breath.    Cardiovascular:  Negative for chest pain and palpitations.   Gastrointestinal:  Negative for abdominal pain, constipation, diarrhea, nausea and vomiting.   Musculoskeletal:  Negative for back pain and joint pain.   Neurological:  Negative for dizziness and headaches.   Psychiatric/Behavioral:  Negative for depression, hallucinations and suicidal ideas.    All other systems reviewed and are negative.       Physical Exam  Temp:  [36.6 °C (97.9 °F)] 36.6 °C (97.9 °F)  Pulse:  [95] 95  Resp:  [18] 18  BP: (117)/(83) 117/83  SpO2:  [99 %] 99 %    Physical Exam  Vitals and nursing note reviewed.   Constitutional:       General: He is not in acute distress.     Appearance: He is not diaphoretic.   HENT:      Mouth/Throat:      Mouth: Mucous membranes are dry.      Pharynx: No oropharyngeal exudate.   Cardiovascular:      Rate and Rhythm: Normal rate and regular rhythm.      Pulses: Normal pulses.      Heart sounds: Normal heart sounds. No murmur heard.  Pulmonary:      Effort: Pulmonary effort is normal. No respiratory distress.      Breath sounds: Normal breath sounds. No wheezing or rales.   Abdominal:      General: Bowel sounds are normal. There is no distension.      Tenderness: There is no abdominal tenderness.   Musculoskeletal:         General: No swelling or tenderness. Normal range of motion.   Skin:     General: Skin is warm.      Capillary Refill: Capillary refill takes less than 2 seconds.      Coloration: Skin is not jaundiced or pale.   Neurological:      General: No focal deficit present.      Mental Status: He is alert and oriented to person, place, and time. Mental status is at baseline.      Motor: No  weakness.   Psychiatric:         Mood and Affect: Mood normal.         Behavior: Behavior normal.         Fluids    Intake/Output Summary (Last 24 hours) at 9/28/2023 1426  Last data filed at 9/28/2023 0900  Gross per 24 hour   Intake 1280 ml   Output --   Net 1280 ml       Laboratory  Recent Labs     09/27/23  0740   WBC 9.1   RBC 4.73   HEMOGLOBIN 15.2   HEMATOCRIT 45.5   MCV 96.2   MCH 32.1   MCHC 33.4   RDW 43.0   PLATELETCT 311   MPV 8.5*     Recent Labs     09/27/23  0740   SODIUM 137   POTASSIUM 4.5   CHLORIDE 101   CO2 27   GLUCOSE 114*   BUN 15   CREATININE 1.15   CALCIUM 9.8                   Imaging  No orders to display        Assessment/Plan  * Toxic encephalopathy- (present on admission)  Assessment & Plan  - Due to intentional drug overdose in an apparent suicide attempt.    Had to be temporarily placed on Precedex drip to control behavior. Downgraded on 9/24.    -Continue lithium 450mg + 600mg.    Contiue haldol 5mg TID  Continue risperdal 1mg BID and 2mg QHS  continue Geodon PRN    -I spoke with psychiatry team, we will continue current medications with lithium, Haldol and Risperdal.  I changed Valium to PRN.    Bipolar affective (HCC)- (present on admission)  Assessment & Plan  -I spoke with psychiatry team, we will continue current medications with lithium, Haldol and Risperdal.  I changed Valium to PRN.  - pt has a flat affect    Amphetamine abuse (HCC)- (present on admission)  Assessment & Plan  UDS positive    Rhabdomyolysis- (present on admission)  Assessment & Plan  - may have been due to agitation, but seroquel overdose may cause  - 1729 on admission, last check was 171  - pt moving/ambulating without pain    Leukocytosis- (present on admission)  Assessment & Plan  - Likely reactive.  Resolved.  No signs of infection.  Holding off on antibiotics.    Intentional overdose (HCC)- (present on admission)  Assessment & Plan  - In an apparent suicide attempt.  -Cardiac status stable.  -Maintain on  suicide/homicide/elopement precaution.    - Continue one-on-one supervision.  - legal hold was extended, pending inpatient psychiatric facility for discharge.      - Patient met with  via Zoom, legal hold was continued.  We are pending [facility] placement.  Complicated discharge as facility is working at half capacity as per case management.         VTE prophylaxis:    enoxaparin ppx      I have performed a physical exam and reviewed and updated ROS and Plan today (9/28/2023). In review of yesterday's note (9/27/2023), there are no changes except as documented above.      Total time spent 53 minutes. I spent greater than 50% of the time for patient care, counseling, and coordination on this date, including unit/floor time, and face-to-face time with the patient as per interval events, my own review of patient's imaging and lab analysis and developing my assessment and plan above.

## 2023-09-28 NOTE — CARE PLAN
The patient is Stable - Low risk of patient condition declining or worsening    Shift Goals  Clinical Goals: Maintain safety throughout this shift  Patient Goals: Food  Family Goals: n/a    Progress made toward(s) clinical / shift goals:    Snack provided per pt request.  Safety sitter continued.  Safety maintained  throughout this shift    Patient is not progressing towards the following goals:      Problem: Knowledge Deficit - Standard  Goal: Patient and family/care givers will demonstrate understanding of plan of care, disease process/condition, diagnostic tests and medications  Outcome: Not Progressing

## 2023-09-28 NOTE — CARE PLAN
The patient is Stable - Low risk of patient condition declining or worsening    Shift Goals  Clinical Goals: Remain safety and no injury throughout the shift  Patient Goals: Food  Family Goals: n/a    Progress made toward(s) clinical / shift goals:  Safety measures rendered.  at bedside. No falls or injury throughout the shift. Resting in bed comfortably.    Patient is not progressing towards the following goals:      Problem: Skin Integrity  Goal: Skin integrity is maintained or improved  Outcome: Progressing     Problem: Fall Risk  Goal: Patient will remain free from falls  Outcome: Progressing     Problem: Pain - Standard  Goal: Alleviation of pain or a reduction in pain to the patient’s comfort goal  Outcome: Progressing

## 2023-09-29 PROCEDURE — A9270 NON-COVERED ITEM OR SERVICE: HCPCS | Performed by: INTERNAL MEDICINE

## 2023-09-29 PROCEDURE — 700102 HCHG RX REV CODE 250 W/ 637 OVERRIDE(OP): Performed by: INTERNAL MEDICINE

## 2023-09-29 PROCEDURE — 99233 SBSQ HOSP IP/OBS HIGH 50: CPT | Performed by: INTERNAL MEDICINE

## 2023-09-29 PROCEDURE — 770001 HCHG ROOM/CARE - MED/SURG/GYN PRIV*

## 2023-09-29 PROCEDURE — 700102 HCHG RX REV CODE 250 W/ 637 OVERRIDE(OP): Performed by: STUDENT IN AN ORGANIZED HEALTH CARE EDUCATION/TRAINING PROGRAM

## 2023-09-29 PROCEDURE — A9270 NON-COVERED ITEM OR SERVICE: HCPCS | Performed by: STUDENT IN AN ORGANIZED HEALTH CARE EDUCATION/TRAINING PROGRAM

## 2023-09-29 RX ORDER — GAUZE BANDAGE 2" X 2"
100 BANDAGE TOPICAL DAILY
Status: DISCONTINUED | OUTPATIENT
Start: 2023-09-29 | End: 2023-10-06 | Stop reason: HOSPADM

## 2023-09-29 RX ORDER — HALOPERIDOL 5 MG/1
10 TABLET ORAL
Status: DISCONTINUED | OUTPATIENT
Start: 2023-09-29 | End: 2023-09-30

## 2023-09-29 RX ORDER — VITAMIN C
1 TAB ORAL DAILY
Status: DISCONTINUED | OUTPATIENT
Start: 2023-09-29 | End: 2023-10-06 | Stop reason: HOSPADM

## 2023-09-29 RX ORDER — DIPHENHYDRAMINE HYDROCHLORIDE 50 MG/ML
50 INJECTION INTRAMUSCULAR; INTRAVENOUS EVERY 6 HOURS PRN
Status: DISCONTINUED | OUTPATIENT
Start: 2023-09-29 | End: 2023-09-29

## 2023-09-29 RX ORDER — ZIPRASIDONE MESYLATE 20 MG/ML
20 INJECTION, POWDER, LYOPHILIZED, FOR SOLUTION INTRAMUSCULAR EVERY 12 HOURS PRN
Status: DISCONTINUED | OUTPATIENT
Start: 2023-09-29 | End: 2023-10-06 | Stop reason: HOSPADM

## 2023-09-29 RX ORDER — HALOPERIDOL 5 MG/1
5 TABLET ORAL
Status: DISCONTINUED | OUTPATIENT
Start: 2023-09-29 | End: 2023-09-29

## 2023-09-29 RX ORDER — DIPHENHYDRAMINE HYDROCHLORIDE 50 MG/ML
50 INJECTION INTRAMUSCULAR; INTRAVENOUS EVERY 6 HOURS PRN
Status: DISCONTINUED | OUTPATIENT
Start: 2023-09-29 | End: 2023-10-06 | Stop reason: HOSPADM

## 2023-09-29 RX ADMIN — LITHIUM CARBONATE 450 MG: 450 TABLET, EXTENDED RELEASE ORAL at 06:24

## 2023-09-29 RX ADMIN — DIAZEPAM 5 MG: 5 TABLET ORAL at 07:45

## 2023-09-29 RX ADMIN — NICOTINE POLACRILEX 2 MG: 2 GUM, CHEWING BUCCAL at 17:18

## 2023-09-29 RX ADMIN — DIAZEPAM 5 MG: 5 TABLET ORAL at 17:18

## 2023-09-29 RX ADMIN — BISACODYL 5 MG: 5 TABLET, COATED ORAL at 07:18

## 2023-09-29 RX ADMIN — NICOTINE POLACRILEX 2 MG: 2 GUM, CHEWING BUCCAL at 09:36

## 2023-09-29 RX ADMIN — THIAMINE HCL TAB 100 MG 100 MG: 100 TAB at 18:20

## 2023-09-29 RX ADMIN — NICOTINE POLACRILEX 2 MG: 2 GUM, CHEWING BUCCAL at 13:52

## 2023-09-29 RX ADMIN — HALOPERIDOL 5 MG: 5 TABLET ORAL at 13:52

## 2023-09-29 RX ADMIN — LITHIUM CARBONATE 600 MG: 300 TABLET, EXTENDED RELEASE ORAL at 21:08

## 2023-09-29 RX ADMIN — NICOTINE 14 MG: 14 PATCH TRANSDERMAL at 06:24

## 2023-09-29 RX ADMIN — Medication 1 TABLET: at 18:20

## 2023-09-29 ASSESSMENT — ENCOUNTER SYMPTOMS: NERVOUS/ANXIOUS: 1

## 2023-09-29 ASSESSMENT — PATIENT HEALTH QUESTIONNAIRE - PHQ9
SUM OF ALL RESPONSES TO PHQ9 QUESTIONS 1 AND 2: 0
2. FEELING DOWN, DEPRESSED, IRRITABLE, OR HOPELESS: NOT AT ALL
2. FEELING DOWN, DEPRESSED, IRRITABLE, OR HOPELESS: NOT AT ALL
SUM OF ALL RESPONSES TO PHQ9 QUESTIONS 1 AND 2: 0
1. LITTLE INTEREST OR PLEASURE IN DOING THINGS: NOT AT ALL
1. LITTLE INTEREST OR PLEASURE IN DOING THINGS: NOT AT ALL

## 2023-09-29 ASSESSMENT — PAIN DESCRIPTION - PAIN TYPE
TYPE: ACUTE PAIN
TYPE: ACUTE PAIN

## 2023-09-29 NOTE — CARE PLAN
The patient is Stable - Low risk of patient condition declining or worsening    Shift Goals  Clinical Goals: Patient will be free from injury throughout the shift  Patient Goals: RUPERT  Family Goals: na    Progress made toward(s) clinical / shift goals:  Patient is free from injury. Patient remained on extended legal hold, on 1: 1 sitter    Patient is not progressing towards the following goals:    Problem: Knowledge Deficit - Standard  Goal: Patient and family/care givers will demonstrate understanding of plan of care, disease process/condition, diagnostic tests and medications  Outcome: Progressing     Problem: Fall Risk  Goal: Patient will remain free from falls  Outcome: Progressing     Problem: Hemodynamics  Goal: Patient's hemodynamics, fluid balance and neurologic status will be stable or improve  Outcome: Progressing     Problem: Psychosocial  Goal: Patient's level of anxiety will decrease  Outcome: Progressing  Goal: Patient's ability to verbalize feelings about condition will improve  Outcome: Progressing  Goal: Patient's ability to re-evaluate and adapt role responsibilities will improve  Outcome: Progressing  Goal: Patient and family will demonstrate ability to cope with life altering diagnosis and/or procedure  Outcome: Progressing  Goal: Spiritual and cultural needs incorporated into hospitalization  Outcome: Progressing     Problem: Fall Risk  Goal: Patient will remain free from falls  Outcome: Progressing     Problem: Psychosocial  Goal: Patient's level of anxiety will decrease  Outcome: Progressing

## 2023-09-29 NOTE — DISCHARGE PLANNING
Received order granting petition for court ordered involuntary admission from the court committing pt to any accepting mental health facility for 6 months(3/28/24), scanned copy into pt's chart.

## 2023-09-29 NOTE — CONSULTS
"  [facility] PSYCHIATRIC FOLLOW-UP:(established)  *Reason for admission:   reported taking 15-20 pills of seroquel. Was combative. He had a prescription bottle with him that was filled 3 days ago that was empty per report.  Police were called as the patient was reported as trespassing and when he was in the police car patient started screaming that he was suicidal and that he overdosed on Seroquel and EMS was called and the patient requested to go to a different facility other than [facility]. The most he could have taken was around 1,250 mg.   *Legal Hold Status: extended                S:  has been refusing risperdol for a couple of days and here and there before that. He is not worse and prefers haldol to it. Says he started refusing when he was having trouble speaking. He is not SI/HI and no obvious psychosis. He denies any as well. He is having some drooling. He is having trouble sleeping because of noise outside room. He also has some confusion around the court order, wanting to go to [facility] ([facility] is where he is eligible) because \"how are they going to get a hold of me?\" Referring to [facility]. Then says he does not have a phone but because he is on SSI he will have money. States he is strong and could go to the shelter...    Notes reviewed: improved but now complains of lethargy, being \"retarded\" motorically and preferring to be manic    O: Medical ROS (as pertinent):                      *Psychiatric Examination:   Vitals:   Vitals:    09/27/23 0628 09/28/23 0630 09/28/23 1100 09/28/23 1619   BP:  117/83 120/80 136/78   Pulse: 96 95 90 100   Resp: 18 18 18 18   Temp:  36.6 °C (97.9 °F) 36.4 °C (97.6 °F) 36.4 °C (97.6 °F)   TempSrc: Temporal Temporal Temporal Temporal   SpO2:  99% 98% 98%   Weight:       Height:         General Appearance:  good eye contact  Abnormal Movements: none , mild drooling  Gait and Posture: within normal limits  Speech: within normal limits  Thought Process: " normal rate  Associations:   linear mostly but with some confusion  Abnormal or Psychotic Thoughts:  mild confusion  Judgement and Insight: fair  Orientation: grossly intact  Recent and Remote Memory: grossly intact  Attention Span and Concentration: intact  Language:fluent  Fund of Knowledge: not tested  Mood and Affect: euthymic  SI/HI:  suicidal - no and homicidal - no        Current Medications:  Scheduled Medications   Medication Dose Frequency    lithium carbonate ER  600 mg Nightly    lithium carbonate ER  450 mg DAILY    haloperidol  5 mg TID    risperiDONE  2 mg Q EVENING    risperiDONE  1 mg BID    nicotine  1 Patch Daily-0600    enoxaparin (LOVENOX) injection  40 mg DAILY AT 1800          *ASSESSMENT/RECOMENDATIONS:  1.Methamphetamine use disorder  2  Bipolar disorder manic with psychosis the latter improved      R/O schizoaffective disorder    4  THC use     Medical:   None acutely      Legal hold: extended  Observation status:   -line of site with sitter  Phone: Yes - Okay to use at nursing availability/discretion.   Visitors: No   Personal belongings: No     Discussed/voalted: TERESA Sauceda MD        Medication and Other Recommendations: final orders as per Tx Tm  [place] level 9/30  2   dc risperdol: not taking it  3   changed haldol to 10 mg hs only: may facilitate sleep  4   [facility] is down to half its beds and there is a waiting list.     Will continue to follow with you.         Discharge recommendations: inpt.     If released from [facility]: Discharge Instructions:  -Reviewed safety plan: 911, ER, PCM, MHC, Suicide crisis line  -Please assist with outpatient Psychiatric/substance use follow up appointments at discharge once medically cleared.

## 2023-09-29 NOTE — PROGRESS NOTES
Received bedside report from day shift ASHUTOSH Sethi at 19:05. Assumed pt care.   Pt seen AO4, calm and ambulating around room. Denies pain and nausea, no signs of distress noted.  Safety checklist done, fall precautions in place.   Bed locked and lowest position.  1:1 sitter continued.  Whiteboard updated.  No other needs reported at this time.   Implementation of POC in progress.

## 2023-09-29 NOTE — CARE PLAN
"The patient is Stable - Low risk of patient condition declining or worsening    Shift Goals  Clinical Goals: Maintain safety  Patient Goals: \"I want my nicotine gum\"  Family Goals: n/a    Progress made toward(s) clinical / shift goals:  Safety checklist utilized to maintain pt safety. Security as sitter continued. PRN nicotine gum given per pt request.     Patient is not progressing towards the following goals:    Problem: Knowledge Deficit - Standard  Goal: Patient and family/care givers will demonstrate understanding of plan of care, disease process/condition, diagnostic tests and medications  Outcome: Not Progressing     "

## 2023-09-29 NOTE — DISCHARGE PLANNING
Alert Team/[facility]   Note:      - [facility]: Talked to Intake RN (Elsy). No beds currently available. Referral is on pending list.

## 2023-09-29 NOTE — PROGRESS NOTES
"Nicotine gum given per pt request, attempted to give pt  scheduled risperidone and haldol, pt refused per pt \"No it makes me slur and sleepy\". \"I just want the lithium\". Educated on importance of taking these meds. Pt still refused. Meds returned to [facility].    19:27 PRN nicotine gum provided.  19:42 PRN Valium given per pt request. Pt requested dulcolax, informed pt he can have PRN dulcolax 1x daily. Pt verbalized understanding. Cranberry juice provided.    06:22 RN at bedside for medpass. Attempted to assess pts VS, pt verbalized \"No, I refuse\". Pt also refused to take resperidone. Education provided.  Pt ok with taking Lithium.   Nicotine patch placed on left arm.     06:50 Report given to Celia RN  "

## 2023-09-29 NOTE — DISCHARGE PLANNING
Case Management Discharge Planning    Admission Date: 9/15/2023  GMLOS: 3.6  ALOS: 13    6-Clicks ADL Score: 24  6-Clicks Mobility Score: 24      Anticipated Discharge Dispo: Discharge Disposition: D/T to psych hosp or distinct part unit (65)    DME Needed: No    Action(s) Taken: Updated Provider/Nurse on Discharge Plan    Patient discussed during morning IDT rounds with team. Patient is medically cleared for discharge at this time, pending placement to a inpatient mental health facility  SWCM will remain available and continue to assist with safe disposition.     Escalations Completed: None    Medically Clear: Yes    Next Steps: pending placement to inpatient mental facility     Barriers to Discharge: Pending Placement    Is the patient up for discharge tomorrow: No

## 2023-09-30 LAB
ALBUMIN SERPL BCP-MCNC: 3.9 G/DL (ref 3.2–4.9)
BUN SERPL-MCNC: 17 MG/DL (ref 8–22)
CALCIUM ALBUM COR SERPL-MCNC: 9.7 MG/DL (ref 8.5–10.5)
CALCIUM SERPL-MCNC: 9.6 MG/DL (ref 8.4–10.2)
CHLORIDE SERPL-SCNC: 103 MMOL/L (ref 96–112)
CO2 SERPL-SCNC: 27 MMOL/L (ref 20–33)
CREAT SERPL-MCNC: 1.08 MG/DL (ref 0.5–1.4)
ERYTHROCYTE [DISTWIDTH] IN BLOOD BY AUTOMATED COUNT: 42.5 FL (ref 35.9–50)
GFR SERPLBLD CREATININE-BSD FMLA CKD-EPI: 91 ML/MIN/1.73 M 2
GLUCOSE SERPL-MCNC: 90 MG/DL (ref 65–99)
HCT VFR BLD AUTO: 43.1 % (ref 42–52)
HGB BLD-MCNC: 14.6 G/DL (ref 14–18)
MAGNESIUM SERPL-MCNC: 2.2 MG/DL (ref 1.5–2.5)
MCH RBC QN AUTO: 32.3 PG (ref 27–33)
MCHC RBC AUTO-ENTMCNC: 33.9 G/DL (ref 32.3–36.5)
MCV RBC AUTO: 95.4 FL (ref 81.4–97.8)
PHOSPHATE SERPL-MCNC: 3.4 MG/DL (ref 2.5–4.5)
PLATELET # BLD AUTO: 331 K/UL (ref 164–446)
PMV BLD AUTO: 8.8 FL (ref 9–12.9)
POTASSIUM SERPL-SCNC: 4.4 MMOL/L (ref 3.6–5.5)
RBC # BLD AUTO: 4.52 M/UL (ref 4.7–6.1)
SODIUM SERPL-SCNC: 138 MMOL/L (ref 135–145)
WBC # BLD AUTO: 9.5 K/UL (ref 4.8–10.8)

## 2023-09-30 PROCEDURE — 770001 HCHG ROOM/CARE - MED/SURG/GYN PRIV*

## 2023-09-30 PROCEDURE — 700102 HCHG RX REV CODE 250 W/ 637 OVERRIDE(OP): Performed by: STUDENT IN AN ORGANIZED HEALTH CARE EDUCATION/TRAINING PROGRAM

## 2023-09-30 PROCEDURE — A9270 NON-COVERED ITEM OR SERVICE: HCPCS | Performed by: INTERNAL MEDICINE

## 2023-09-30 PROCEDURE — 80069 RENAL FUNCTION PANEL: CPT

## 2023-09-30 PROCEDURE — 94760 N-INVAS EAR/PLS OXIMETRY 1: CPT

## 2023-09-30 PROCEDURE — 700102 HCHG RX REV CODE 250 W/ 637 OVERRIDE(OP): Performed by: INTERNAL MEDICINE

## 2023-09-30 PROCEDURE — A9270 NON-COVERED ITEM OR SERVICE: HCPCS | Performed by: STUDENT IN AN ORGANIZED HEALTH CARE EDUCATION/TRAINING PROGRAM

## 2023-09-30 PROCEDURE — 85027 COMPLETE CBC AUTOMATED: CPT

## 2023-09-30 PROCEDURE — 83735 ASSAY OF MAGNESIUM: CPT

## 2023-09-30 PROCEDURE — 99233 SBSQ HOSP IP/OBS HIGH 50: CPT | Performed by: INTERNAL MEDICINE

## 2023-09-30 PROCEDURE — 36415 COLL VENOUS BLD VENIPUNCTURE: CPT

## 2023-09-30 RX ORDER — POLYETHYLENE GLYCOL 3350 17 G/17G
1 POWDER, FOR SOLUTION ORAL
Status: DISCONTINUED | OUTPATIENT
Start: 2023-09-30 | End: 2023-10-06 | Stop reason: HOSPADM

## 2023-09-30 RX ORDER — LITHIUM CARBONATE 450 MG
450 TABLET, EXTENDED RELEASE ORAL DAILY
Status: DISCONTINUED | OUTPATIENT
Start: 2023-10-01 | End: 2023-10-01

## 2023-09-30 RX ORDER — AMOXICILLIN 250 MG
2 CAPSULE ORAL 2 TIMES DAILY
Status: DISCONTINUED | OUTPATIENT
Start: 2023-09-30 | End: 2023-10-06 | Stop reason: HOSPADM

## 2023-09-30 RX ADMIN — LITHIUM CARBONATE 450 MG: 450 TABLET, EXTENDED RELEASE ORAL at 04:09

## 2023-09-30 RX ADMIN — NICOTINE POLACRILEX 2 MG: 2 GUM, CHEWING BUCCAL at 16:49

## 2023-09-30 RX ADMIN — NICOTINE POLACRILEX 2 MG: 2 GUM, CHEWING BUCCAL at 18:08

## 2023-09-30 RX ADMIN — RIVAROXABAN 10 MG: 10 TABLET, FILM COATED ORAL at 19:50

## 2023-09-30 RX ADMIN — NICOTINE POLACRILEX 2 MG: 2 GUM, CHEWING BUCCAL at 12:48

## 2023-09-30 RX ADMIN — THIAMINE HCL TAB 100 MG 100 MG: 100 TAB at 04:10

## 2023-09-30 RX ADMIN — DIAZEPAM 5 MG: 5 TABLET ORAL at 04:10

## 2023-09-30 RX ADMIN — DIAZEPAM 5 MG: 5 TABLET ORAL at 19:51

## 2023-09-30 RX ADMIN — NICOTINE POLACRILEX 2 MG: 2 GUM, CHEWING BUCCAL at 19:51

## 2023-09-30 RX ADMIN — NICOTINE 14 MG: 14 PATCH TRANSDERMAL at 04:10

## 2023-09-30 RX ADMIN — NICOTINE POLACRILEX 2 MG: 2 GUM, CHEWING BUCCAL at 07:34

## 2023-09-30 RX ADMIN — BISACODYL 5 MG: 5 TABLET, COATED ORAL at 16:49

## 2023-09-30 RX ADMIN — LITHIUM CARBONATE 600 MG: 300 TABLET, EXTENDED RELEASE ORAL at 19:51

## 2023-09-30 RX ADMIN — Medication 1 TABLET: at 04:10

## 2023-09-30 ASSESSMENT — PAIN DESCRIPTION - PAIN TYPE
TYPE: ACUTE PAIN

## 2023-09-30 ASSESSMENT — ENCOUNTER SYMPTOMS: NERVOUS/ANXIOUS: 1

## 2023-09-30 NOTE — CONSULTS
"  [facility] PSYCHIATRIC FOLLOW-UP:(established)  *Reason for admission:    reported taking 15-20 pills of seroquel. Was combative. He had a prescription bottle with him that was filled 3 days ago that was empty per report.  Police were called as the patient was reported as trespassing and when he was in the police car patient started screaming that he was suicidal and that he overdosed on Seroquel and EMS was called and the patient requested to go to a different facility other than [facility]. The most he could have taken was around 1,250 mg.   *Legal Hold Status: extended             S:  stopped taking the haldol yesterday because of the drooling and says he would rather not take it anymore. Explained the risks, would prefer to taper it down but he was firm though pleasant. He still seems to be confuse. He \"feels\" the lithium working\" a level will be done in am. He is not depressed, nor SI/HI but still seems confused. Slept about 5 hours.    O: Medical ROS (as pertinent):                      *Psychiatric Examination:   Vitals:   Vitals:    09/28/23 1619 09/29/23 1650 09/29/23 1700 09/29/23 2200   BP:  138/71 117/79    Pulse: 100 (!) 114     Resp: 18 18  16   Temp: 36.4 °C (97.6 °F) 36.5 °C (97.7 °F)     TempSrc: Temporal Temporal     SpO2: 98% 90%     Weight:       Height:         General Appearance:  good eye contact  Abnormal Movements: drooling gone  Gait and Posture: within normal limits  Speech: within normal limits  Thought Process: normal rate  Associations:   linear mostly but with some confusion  Abnormal or Psychotic Thoughts:  mild confusion  Judgement and Insight: fair  Orientation: grossly intact  Recent and Remote Memory: grossly intact  Attention Span and Concentration: intact  Language:fluent  Fund of Knowledge: not tested  Mood and Affect: euthymic  SI/HI:  suicidal - no and homicidal - no        Current Medications:  Scheduled Medications   Medication Dose Frequency "    haloperidol  10 mg QHS    thiamine  100 mg DAILY    vitamin B comp+C  1 Tablet DAILY    lithium carbonate ER  600 mg Nightly    lithium carbonate ER  450 mg DAILY    nicotine  1 Patch Daily-0600    enoxaparin (LOVENOX) injection  40 mg DAILY AT 1800          *ASSESSMENT/RECOMENDATIONS:  1.Methamphetamine use disorder  2  Bipolar disorder manic with psychosis the latter improved      R/O schizoaffective disorder    4  THC use     Medical:   None acutely      Legal hold: extended  Observation status:   -line of site with sitter  Phone: Yes - Okay to use at nursing availability/discretion.   Visitors: No   Personal belongings: No      Discussed/voalted: TERESA Sauceda MD        Medication and Other Recommendations: final orders as per Tx Tm  Lithium level now in am: needs to be drawn approximately 12 hours after last dose.   [facility] for now  3  [facility] is down to half its beds and there is a waiting list.      Will continue to follow with you.         Discharge recommendations: inpt.     If released from [facility]: Discharge Instructions:  -Reviewed safety plan: 911, ER, PCM, MHC, Suicide crisis line  -Please assist with outpatient Psychiatric/substance use follow up appointments at discharge once medically cleared.

## 2023-09-30 NOTE — PROGRESS NOTES
Hospital Medicine Daily Progress Note    Date of Service  9/29/2023    Chief Complaint  Peter Grace is a 38 y.o. male admitted 9/15/2023 with   Chief Complaint   Patient presents with    ALOC           Drug Overdose    Legal 2000     Patient BIB EMS with legal hold initiated by [employer]. Patient reports taking 15-20 pills of Seraquil. Patient combative in triage, arrived in four point restraints by EMS.      Hospital Course  No notes on file    Interval Problem Update  9/26:  Patient had no acute complaints.  - I reviewed psych notes, lithium was increased, will need to continue monitoring levels. Last lithium level was 0.7.  - legal hold was extended, pending inpatient psychiatric facility for discharge.  - I ordered ECG to recheck Qtc    9/27:  -I reviewed EKG, QTc appropriate for medications.  - I reviewed psychiatry notes, legal hold not discontinued.  We are pending court date.  We will continue current psychiatric medications.  - Continue sitter  -I reviewed labs, potassium 4.5, creatinine 1.15, magnesium 2.1.  Sodium 137.    9/28:  Patient did not want to take his medications today other than lithium.  We continue to encourage him to continue his medication as per psychiatry team recommendations, but he feels that it makes him too slow to speak but at this moment he is stuttering.  - Patient met with , legal hold was continued.  We are pending [facility] placement.  Complicated discharge as facility is working at half capacity as per case management.  -I spoke with psychiatry team, we will continue current medications with lithium, Haldol and Risperdal.  I changed Valium to PRN.    9/29:  Patient had no acute complaints but was asking for Valium.  - I spoke with psychiatry, we will discontinue Risperdal, changing Haldol to 10 mg at bedtime and continue lithium with lithium level checked tomorrow.  We are still pending for psychiatric facility bed availability but this may take time.    I have  discussed this patient's plan of care and discharge plan at IDT rounds today with Case Management, Nursing, Nursing leadership, and other members of the IDT team.    Consultants/Specialty  critical care and psychiatry    Code Status  Full Code    Disposition  The patient is medically cleared for discharge to home or a post-acute facility.  Anticipate discharge to: a psychiatric hospital    I have placed the appropriate orders for post-discharge needs.    Review of Systems  Review of Systems   Psychiatric/Behavioral:  Negative for suicidal ideas. The patient is nervous/anxious.    All other systems reviewed and are negative.       Physical Exam  BP: (117)/(79) 117/79    Physical Exam  Vitals and nursing note reviewed.   Constitutional:       General: He is not in acute distress.  HENT:      Mouth/Throat:      Mouth: Mucous membranes are dry.      Pharynx: No oropharyngeal exudate.   Cardiovascular:      Rate and Rhythm: Normal rate and regular rhythm.      Pulses: Normal pulses.      Heart sounds: Normal heart sounds.   Pulmonary:      Effort: Pulmonary effort is normal. No respiratory distress.      Breath sounds: Normal breath sounds.   Abdominal:      General: Bowel sounds are normal. There is no distension.   Musculoskeletal:         General: No swelling. Normal range of motion.   Skin:     General: Skin is warm.      Coloration: Skin is not jaundiced or pale.   Neurological:      Mental Status: He is alert and oriented to person, place, and time. Mental status is at baseline.      Motor: No weakness.   Psychiatric:         Mood and Affect: Mood normal.         Behavior: Behavior normal.         Fluids    Intake/Output Summary (Last 24 hours) at 9/29/2023 1732  Last data filed at 9/29/2023 1400  Gross per 24 hour   Intake 1120 ml   Output --   Net 1120 ml       Laboratory  Recent Labs     09/27/23  0740   WBC 9.1   RBC 4.73   HEMOGLOBIN 15.2   HEMATOCRIT 45.5   MCV 96.2   MCH 32.1   MCHC 33.4   RDW 43.0    PLATELETCT 311   MPV 8.5*     Recent Labs     09/27/23  0740   SODIUM 137   POTASSIUM 4.5   CHLORIDE 101   CO2 27   GLUCOSE 114*   BUN 15   CREATININE 1.15   CALCIUM 9.8                   Imaging  No orders to display        Assessment/Plan  * Toxic encephalopathy- (present on admission)  Assessment & Plan  - Due to intentional drug overdose in an apparent suicide attempt.    Had to be temporarily placed on Precedex drip to control behavior. Downgraded on 9/24.    Mentation back to likely new baseline    Bipolar affective (HCC)- (present on admission)  Assessment & Plan  I spoke with psychiatrist, medications adjusted.  -We will continue lithium 450 mg and 600 mg dose.    -I have ordered for lithium levels and repeat CBC, renal function and magnesium levels for tomorrow.  -We will continue Haldol 10 mg nightly  - Continue Valium as needed for anxiety, and Geodon as needed for severe agitation    Amphetamine abuse (HCC)- (present on admission)  Assessment & Plan  UDS positive    Rhabdomyolysis- (present on admission)  Assessment & Plan  - may have been due to agitation, but seroquel overdose may cause  - 1729 on admission, last check was 171  - pt moving/ambulating without pain    Leukocytosis- (present on admission)  Assessment & Plan  - Likely reactive.  Resolved.  No signs of infection.  Holding off on antibiotics.    Intentional overdose (HCC)- (present on admission)  Assessment & Plan  - In an apparent suicide attempt.    - Maintain on suicide/homicide/elopement precaution.    - Continue one-on-one supervision.  - Patient met with  via Zoom, legal hold was continued.    - We are pending [facility] placement.  Complicated discharge as facility is working at half capacity as per case management.         VTE prophylaxis:    enoxaparin ppx  Patient has been declining for DVT prophylaxis    I have performed a physical exam and reviewed and updated ROS and Plan today (9/29/2023). In review of yesterday's note  (9/28/2023), there are no changes except as documented above.      Total time spent 51 minutes. I spent greater than 50% of the time for patient care, counseling, and coordination on this date, including unit/floor time, and face-to-face time with the patient as per interval events, my own review of patient's imaging and lab analysis and developing my assessment and plan above.

## 2023-09-30 NOTE — PROGRESS NOTES
Received report from day shift RN. Pt resting in bed with eyes closed. Respirations even and unlabored. Security sitter at door.

## 2023-09-30 NOTE — PROGRESS NOTES
Pt awake at 0400 requesting water and reporting he cannot sleep. Offered valium. Pt agreed to take medication and then asked if he could take all of his morning medication at this time so he did not have to be woken up again. Still refusing VS. Meds given. Informed pt that we need to get labs completed for a lithium level. Pt refusing at this time but did say he will let them do it when he is awake later.

## 2023-09-30 NOTE — CARE PLAN
"The patient is Stable - Low risk of patient condition declining or worsening    Shift Goals  Clinical Goals: safety, compliance with medication, decreased agitation  Patient Goals: \"I want to sleep and for you to not disturb me\"  Family Goals: na    Progress made toward(s) clinical / shift goals:  pt slept well over night, no outbursts or signs of agitation    Patient is not progressing towards the following goals: refused haldol despite education      Problem: Knowledge Deficit - Standard  Goal: Patient and family/care givers will demonstrate understanding of plan of care, disease process/condition, diagnostic tests and medications  Outcome: Progressing     Problem: Skin Integrity  Goal: Skin integrity is maintained or improved  Outcome: Progressing     Problem: Fall Risk  Goal: Patient will remain free from falls  Outcome: Progressing     Problem: Psychosocial  Goal: Patient's level of anxiety will decrease  Outcome: Progressing     Problem: Psychosocial  Goal: Patient's ability to re-evaluate and adapt role responsibilities will improve  Outcome: Progressing     "

## 2023-10-01 LAB — LITHIUM SERPL-MCNC: 0.4 MMOL/L (ref 0.6–1.2)

## 2023-10-01 PROCEDURE — 36415 COLL VENOUS BLD VENIPUNCTURE: CPT

## 2023-10-01 PROCEDURE — 770001 HCHG ROOM/CARE - MED/SURG/GYN PRIV*

## 2023-10-01 PROCEDURE — 700102 HCHG RX REV CODE 250 W/ 637 OVERRIDE(OP): Performed by: INTERNAL MEDICINE

## 2023-10-01 PROCEDURE — 99232 SBSQ HOSP IP/OBS MODERATE 35: CPT | Performed by: INTERNAL MEDICINE

## 2023-10-01 PROCEDURE — 80178 ASSAY OF LITHIUM: CPT

## 2023-10-01 PROCEDURE — A9270 NON-COVERED ITEM OR SERVICE: HCPCS | Performed by: INTERNAL MEDICINE

## 2023-10-01 PROCEDURE — 700102 HCHG RX REV CODE 250 W/ 637 OVERRIDE(OP): Performed by: STUDENT IN AN ORGANIZED HEALTH CARE EDUCATION/TRAINING PROGRAM

## 2023-10-01 PROCEDURE — 94760 N-INVAS EAR/PLS OXIMETRY 1: CPT

## 2023-10-01 PROCEDURE — A9270 NON-COVERED ITEM OR SERVICE: HCPCS | Performed by: STUDENT IN AN ORGANIZED HEALTH CARE EDUCATION/TRAINING PROGRAM

## 2023-10-01 RX ORDER — LITHIUM CARBONATE 300 MG/1
600 TABLET, FILM COATED, EXTENDED RELEASE ORAL DAILY
Status: DISCONTINUED | OUTPATIENT
Start: 2023-10-02 | End: 2023-10-06 | Stop reason: HOSPADM

## 2023-10-01 RX ADMIN — NICOTINE POLACRILEX 2 MG: 2 GUM, CHEWING BUCCAL at 19:23

## 2023-10-01 RX ADMIN — NICOTINE 14 MG: 14 PATCH TRANSDERMAL at 06:31

## 2023-10-01 RX ADMIN — SENNOSIDES AND DOCUSATE SODIUM 2 TABLET: 50; 8.6 TABLET ORAL at 17:42

## 2023-10-01 RX ADMIN — Medication 1 TABLET: at 06:00

## 2023-10-01 RX ADMIN — THIAMINE HCL TAB 100 MG 100 MG: 100 TAB at 06:31

## 2023-10-01 RX ADMIN — DIAZEPAM 5 MG: 5 TABLET ORAL at 20:42

## 2023-10-01 RX ADMIN — NICOTINE POLACRILEX 2 MG: 2 GUM, CHEWING BUCCAL at 09:09

## 2023-10-01 RX ADMIN — RIVAROXABAN 10 MG: 10 TABLET, FILM COATED ORAL at 17:42

## 2023-10-01 RX ADMIN — LITHIUM CARBONATE 450 MG: 450 TABLET, EXTENDED RELEASE ORAL at 09:05

## 2023-10-01 RX ADMIN — SENNOSIDES AND DOCUSATE SODIUM 2 TABLET: 50; 8.6 TABLET ORAL at 06:30

## 2023-10-01 RX ADMIN — DIAZEPAM 5 MG: 5 TABLET ORAL at 07:29

## 2023-10-01 RX ADMIN — LITHIUM CARBONATE 600 MG: 300 TABLET, EXTENDED RELEASE ORAL at 20:42

## 2023-10-01 ASSESSMENT — ENCOUNTER SYMPTOMS: NERVOUS/ANXIOUS: 1

## 2023-10-01 ASSESSMENT — PAIN DESCRIPTION - PAIN TYPE: TYPE: ACUTE PAIN

## 2023-10-01 NOTE — PROGRESS NOTES
"Attempted to change sheets from pts bed pt upset reporting \"No don't touch them, they're clean enough I don't wan't them changed\".  "

## 2023-10-01 NOTE — CONSULTS
"  [facility] PSYCHIATRIC FOLLOW-UP:(established)  *Reason for admission:   reported taking 15-20 pills of seroquel. Was combative. He had a prescription bottle with him that was filled 3 days ago that was empty per report.  Police were called as the patient was reported as trespassing and when he was in the police car patient started screaming that he was suicidal and that he overdosed on Seroquel and EMS was called and the patient requested to go to a different facility other than [facility]. The most he could have taken was around 1,250 mg.   *Legal Hold Status: extended                 S:  pt says he is doing much better. He speaks fast but is able to stop, and shows me, he has made dc plans and backup plans as follows:   -first go to the [facility]  -if there are no beds, go to a store, get a back pack pack and sleeping bag as he will have to sleep outside (just got his disability check for this month deposited)  -then try to go to [place]: their shelter is nicer than here and see if he can get a bed.   -if no bed, go to the [facility] where he has been before and see if he can be admitted there.   -he would like bus ticket (s) to help him with these endevors  -thinks he might need clothes before dc \"I think they cut my shirt off\"    It is a good plan.    When talking of what got him in here, not sleeping \"thinking\" he found a body..... and that his whole family was dead which is why I wanted to die and I knew I needed help\", he became emotional. He states directly \"I know now these were hallucinations\".     O: Medical ROS (as pertinent):                      *Psychiatric Examination:   Vitals:   Vitals:    09/29/23 1700 09/30/23 1955 10/01/23 0845 10/01/23 1029   BP: 117/79 123/89  124/79   Pulse:  80 99 92   Resp:  20 18 17   Temp:    36.8 °C (98.2 °F)   TempSrc:    Temporal   SpO2:  95% 95%    Weight:       Height:         General Appearance:  good eye contact  Abnormal " Movements: drooling gone  Gait and Posture: within normal limits  Speech: within normal limits  Thought Process: normal rate  Associations:   linear mostly but with some confusion  Abnormal or Psychotic Thoughts: none  Judgement and Insight: improved  Orientation: grossly intact  Recent and Remote Memory: grossly intact  Attention Span and Concentration: intact  Language:fluent  Fund of Knowledge: not tested  Mood and Affect: euthymic to elevated with mild lability when talking about what happened and his being admitted.   SI/HI:  suicidal - no and homicidal - no     Current Medications:  Scheduled Medications   Medication Dose Frequency    lithium carbonate ER  450 mg DAILY    senna-docusate  2 Tablet BID    rivaroxaban  10 mg DAILY AT 1800    thiamine  100 mg DAILY    vitamin B comp+C  1 Tablet DAILY    lithium carbonate ER  600 mg Nightly    nicotine  1 Patch Daily-0600      Latest Reference Range & Units 09/18/23 13:45 09/23/23 03:00 09/24/23 10:48 10/01/23 08:56   Lithium 0.6 - 1.2 mmol/L 0.1 (L) 0.5 (L) 0.7 0.4 (L)   (L): Data is abnormally low       *ASSESSMENT/RECOMENDATIONS:  1.Methamphetamine use disorder  2  Bipolar disorder manic with psychosis the latter improved      R/O schizoaffective disorder    4  THC use     Medical:   None acutely      Legal hold: extended  Observation status:   -line of site with sitter  Phone: Yes - Okay to use at nursing availability/discretion.   Visitors: No   Personal belongings: No      Discussed/voalted: TERESA Sauceda MD    Medication and Other Recommendations: final orders as per Tx Tm  Change lithium to lithium carbonate to 600 mg bid.   2    not psychotic and certainly clearer in thinking.he has to have a therapeutic level of lithium before dc. At this point he should come into range within a day or two and not get toxic: the most his blood level will increase with the increase in dose is 0.5    Will continue to follow with you.      Discharge recommendations: close to being  able to dc but not quite there. He is on a waiting list for [facility]    If released from [facility]: Discharge Instructions:  -Reviewed safety plan: 911, ER, PCM, MHC, Suicide crisis line  -Please assist with outpatient Psychiatric/substance use follow up appointments at discharge once medically cleared.

## 2023-10-01 NOTE — PROGRESS NOTES
Bedside report received from night RN. Assumed care of patient. Daily plan of care discussed. Pt awake and alert. Security sitter in place. Pt agreeable to blood draw for lithium level this AM, denies further needs at this time. Pt currently denies SI/HI. 12 hour chart check complete. Hourly rounding in place.

## 2023-10-01 NOTE — CARE PLAN
Problem: Fall Risk  Goal: Patient will remain free from falls  Outcome: Progressing     Problem: Safety - Medical Restraint  Goal: Remains free of injury from restraints (Restraint for Interference with Medical Device)  Outcome: Progressing     Problem: Psychosocial  Goal: Patient's level of anxiety will decrease  Outcome: Progressing     The patient is Stable - Low risk of patient condition declining or worsening    Shift Goals  Clinical Goals: Continue comfort and safety measures throughout shift; monitor lithium levels  Patient Goals: Stay informed with care plan and medications  Family Goals: n/a    Progress made toward(s) clinical / shift goals:  Lithium lab draw completed successfully this AM. Pt remained agreeable to most interventions this shift, remained free from restraints. Pt given frequent updates re: current status and discharge plan.    Patient is not progressing towards the following goals: n/a

## 2023-10-01 NOTE — PROGRESS NOTES
Hospital Medicine Daily Progress Note    Date of Service  9/30/2023    Chief Complaint  Peter Grace is a 38 y.o. male admitted 9/15/2023 with   Chief Complaint   Patient presents with    ALOC           Drug Overdose    Legal 2000     Patient BIB EMS with legal hold initiated by [employer]. Patient reports taking 15-20 pills of Seraquil. Patient combative in triage, arrived in four point restraints by EMS.      Hospital Course  No notes on file    Interval Problem Update  9/26:  Patient had no acute complaints.  - I reviewed psych notes, lithium was increased, will need to continue monitoring levels. Last lithium level was 0.7.  - legal hold was extended, pending inpatient psychiatric facility for discharge.  - I ordered ECG to recheck Qtc    9/27:  -I reviewed EKG, QTc appropriate for medications.  - I reviewed psychiatry notes, legal hold not discontinued.  We are pending court date.  We will continue current psychiatric medications.  - Continue sitter  -I reviewed labs, potassium 4.5, creatinine 1.15, magnesium 2.1.  Sodium 137.    9/28:  Patient did not want to take his medications today other than lithium.  We continue to encourage him to continue his medication as per psychiatry team recommendations, but he feels that it makes him too slow to speak but at this moment he is stuttering.  - Patient met with , legal hold was continued.  We are pending [facility] placement.  Complicated discharge as facility is working at half capacity as per case management.  -I spoke with psychiatry team, we will continue current medications with lithium, Haldol and Risperdal.  I changed Valium to PRN.    9/29:  Patient had no acute complaints but was asking for Valium.  - I spoke with psychiatry, we will discontinue Risperdal, changing Haldol to 10 mg at bedtime and continue lithium with lithium level checked tomorrow.  We are still pending for psychiatric facility bed availability but this may take time.    9/30:  Patient  stated he was eager to see if encouraged she will discontinue his legal hold if he continues to take his medications.  - I spoke with psychiatrist, we are discontinuing oral Haldol at bedtime.  We will continue lithium, pending lithium levels.  - We will continue with transfer to inpatient psych facility    I have discussed this patient's plan of care and discharge plan at IDT rounds today with Case Management, Nursing, Nursing leadership, and other members of the IDT team.    Consultants/Specialty  critical care and psychiatry    Code Status  Full Code    Disposition  The patient is medically cleared for discharge to home or a post-acute facility.  Anticipate discharge to: a psychiatric hospital    I have placed the appropriate orders for post-discharge needs.    Review of Systems  Review of Systems   Psychiatric/Behavioral:  Negative for suicidal ideas. The patient is nervous/anxious.    All other systems reviewed and are negative.       Physical Exam  Resp:  [16] 16    Physical Exam  Vitals and nursing note reviewed.   Constitutional:       General: He is not in acute distress.  HENT:      Mouth/Throat:      Mouth: Mucous membranes are dry.      Pharynx: No oropharyngeal exudate.   Cardiovascular:      Rate and Rhythm: Normal rate and regular rhythm.      Pulses: Normal pulses.      Heart sounds: Normal heart sounds.   Pulmonary:      Effort: Pulmonary effort is normal. No respiratory distress.      Breath sounds: Normal breath sounds.   Abdominal:      General: Bowel sounds are normal. There is no distension.   Musculoskeletal:         General: No swelling. Normal range of motion.   Skin:     General: Skin is warm.      Coloration: Skin is not jaundiced or pale.   Neurological:      Mental Status: He is alert and oriented to person, place, and time. Mental status is at baseline.      Motor: No weakness.   Psychiatric:         Mood and Affect: Mood normal.         Behavior: Behavior normal.          Fluids    Intake/Output Summary (Last 24 hours) at 9/30/2023 1905  Last data filed at 9/30/2023 0410  Gross per 24 hour   Intake 360 ml   Output --   Net 360 ml       Laboratory  Recent Labs     09/30/23  0939   WBC 9.5   RBC 4.52*   HEMOGLOBIN 14.6   HEMATOCRIT 43.1   MCV 95.4   MCH 32.3   MCHC 33.9   RDW 42.5   PLATELETCT 331   MPV 8.8*     Recent Labs     09/30/23  0939   SODIUM 138   POTASSIUM 4.4   CHLORIDE 103   CO2 27   GLUCOSE 90   BUN 17   CREATININE 1.08   CALCIUM 9.6                   Imaging  No orders to display        Assessment/Plan  * Toxic encephalopathy- (present on admission)  Assessment & Plan  - Due to intentional drug overdose in an apparent suicide attempt.    Had to be temporarily placed on Precedex drip to control behavior. Downgraded on 9/24.    Mentation back to likely new baseline    Bipolar affective (HCC)- (present on admission)  Assessment & Plan  I spoke with psychiatrist, medications adjusted.  -We will continue lithium 450 mg and 600 mg dose.    -I have ordered for lithium levels  - Continue Valium as needed for anxiety, and Geodon as needed for severe agitation    - I spoke with psychiatrist, we are discontinuing oral Haldol at bedtime.  We will continue lithium, pending lithium levels.  - We will continue with transfer to inpatient psych facility    Amphetamine abuse (HCC)- (present on admission)  Assessment & Plan  UDS positive    Rhabdomyolysis- (present on admission)  Assessment & Plan  - may have been due to agitation, but seroquel overdose may cause  - 1729 on admission, last check was 171  - pt moving/ambulating without pain    Leukocytosis- (present on admission)  Assessment & Plan  - Likely reactive.  Resolved.  No signs of infection.  Holding off on antibiotics.    Intentional overdose (HCC)- (present on admission)  Assessment & Plan  - In an apparent suicide attempt.    - Maintain on suicide/homicide/elopement precaution.    - Continue one-on-one supervision.  -  Patient met with  via Zoom, legal hold was continued.    - We are pending [facility] placement.  Complicated discharge as facility is working at half capacity as per case management.    - We will continue with transfer to inpatient psych facility  -I reviewed patient's labs, Hemoglobin 14.6, sodium 138, potassium 4.4, phosphorus 3.4, magnesium 2.2 WNL         VTE prophylaxis:    Xarelto 10mg daily as prophylaxis  Patient has been declining for DVT prophylaxis    I have performed a physical exam and reviewed and updated ROS and Plan today (9/30/2023). In review of yesterday's note (9/29/2023), there are no changes except as documented above.      Total time spent 52 minutes.    This included my review of patient's overnight RN notes, face to face interview, physical examination, lab analysis.  Also includes my documented assessments and interventions above.  I spoke with specialist psychiatrist.  In addition, I spoke with entire care team on patient's treatment plan and DC planning on IDT rounds.

## 2023-10-01 NOTE — CARE PLAN
"The patient is Stable - Low risk of patient condition declining or worsening    Shift Goals  Clinical Goals: Maintain safety this shift  Patient Goals: \"I want my nicotine gum\"  Family Goals: na    Progress made toward(s) clinical / shift goals:  PRN Nicotine gum provided, safety precautions in place. 1:1 sitter continued. Pt safety maintained.    Patient is not progressing towards the following goals:    "

## 2023-10-01 NOTE — CARE PLAN
The patient is Stable - Low risk of patient condition declining or worsening    Shift Goals  Clinical Goals: Pt will shower by end of shift  Patient Goals: sleep, shower and talk on the phone  Family Goals: na    Progress made toward(s) clinical / shift goals:  Patient was able to shower. Pt remained cooperative throughout shift.     Patient is not progressing towards the following goals:

## 2023-10-01 NOTE — PROGRESS NOTES
Hospital Medicine Daily Progress Note    Date of Service  10/1/2023    Chief Complaint  Peter Grace is a 38 y.o. male admitted 9/15/2023 with   Chief Complaint   Patient presents with    ALOC           Drug Overdose    Legal 2000     Patient BIB EMS with legal hold initiated by [employer]. Patient reports taking 15-20 pills of Seraquil. Patient combative in triage, arrived in four point restraints by EMS.      Hospital Course  No notes on file    Interval Problem Update  9/26:  Patient had no acute complaints.  - I reviewed psych notes, lithium was increased, will need to continue monitoring levels. Last lithium level was 0.7.  - legal hold was extended, pending inpatient psychiatric facility for discharge.  - I ordered ECG to recheck Qtc    9/27:  -I reviewed EKG, QTc appropriate for medications.  - I reviewed psychiatry notes, legal hold not discontinued.  We are pending court date.  We will continue current psychiatric medications.  - Continue sitter  -I reviewed labs, potassium 4.5, creatinine 1.15, magnesium 2.1.  Sodium 137.    9/28:  Patient did not want to take his medications today other than lithium.  We continue to encourage him to continue his medication as per psychiatry team recommendations, but he feels that it makes him too slow to speak but at this moment he is stuttering.  - Patient met with , legal hold was continued.  We are pending [facility] placement.  Complicated discharge as facility is working at half capacity as per case management.  -I spoke with psychiatry team, we will continue current medications with lithium, Haldol and Risperdal.  I changed Valium to PRN.    9/29:  Patient had no acute complaints but was asking for Valium.  - I spoke with psychiatry, we will discontinue Risperdal, changing Haldol to 10 mg at bedtime and continue lithium with lithium level checked tomorrow.  We are still pending for psychiatric facility bed availability but this may take time.    9/30:  Patient  stated he was eager to see if encouraged she will discontinue his legal hold if he continues to take his medications.  - I spoke with psychiatrist, we are discontinuing oral Haldol at bedtime.  We will continue lithium, pending lithium levels.  - We will continue with transfer to inpatient psych facility    10/01:  Patient had no acute complaints  - I reviewed lithium level, 0.4, low.  We will need to reach out to psychiatry if they wish to increase lithium to 600 mg twice daily.    I have discussed this patient's plan of care and discharge plan at IDT rounds today with Case Management, Nursing, Nursing leadership, and other members of the IDT team.    Consultants/Specialty  critical care and psychiatry    Code Status  Full Code    Disposition  The patient is medically cleared for discharge to home or a post-acute facility.  Anticipate discharge to: a psychiatric hospital    I have placed the appropriate orders for post-discharge needs.    Review of Systems  Review of Systems   Psychiatric/Behavioral:  Negative for suicidal ideas. The patient is nervous/anxious.    All other systems reviewed and are negative.       Physical Exam  Temp:  [36.8 °C (98.2 °F)] 36.8 °C (98.2 °F)  Pulse:  [80-99] 92  Resp:  [17-20] 17  BP: (123-124)/(79-89) 124/79  SpO2:  [95 %] 95 %    Physical Exam  Vitals and nursing note reviewed.   Constitutional:       General: He is not in acute distress.  HENT:      Mouth/Throat:      Mouth: Mucous membranes are dry.      Pharynx: No oropharyngeal exudate.   Cardiovascular:      Rate and Rhythm: Normal rate and regular rhythm.      Pulses: Normal pulses.      Heart sounds: Normal heart sounds.   Pulmonary:      Effort: Pulmonary effort is normal. No respiratory distress.      Breath sounds: Normal breath sounds.   Abdominal:      General: Bowel sounds are normal. There is no distension.   Musculoskeletal:         General: No swelling. Normal range of motion.   Skin:     General: Skin is warm.       Coloration: Skin is not jaundiced or pale.   Neurological:      Mental Status: He is alert and oriented to person, place, and time. Mental status is at baseline.      Motor: No weakness.   Psychiatric:         Mood and Affect: Mood normal.         Behavior: Behavior normal.         Fluids    Intake/Output Summary (Last 24 hours) at 10/1/2023 1407  Last data filed at 10/1/2023 0600  Gross per 24 hour   Intake 480 ml   Output --   Net 480 ml       Laboratory  Recent Labs     09/30/23  0939   WBC 9.5   RBC 4.52*   HEMOGLOBIN 14.6   HEMATOCRIT 43.1   MCV 95.4   MCH 32.3   MCHC 33.9   RDW 42.5   PLATELETCT 331   MPV 8.8*     Recent Labs     09/30/23  0939   SODIUM 138   POTASSIUM 4.4   CHLORIDE 103   CO2 27   GLUCOSE 90   BUN 17   CREATININE 1.08   CALCIUM 9.6                   Imaging  No orders to display        Assessment/Plan  * Toxic encephalopathy- (present on admission)  Assessment & Plan  - Due to intentional drug overdose in an apparent suicide attempt.    Had to be temporarily placed on Precedex drip to control behavior. Downgraded on 9/24.    Mentation back to likely new baseline    Bipolar affective (HCC)- (present on admission)  Assessment & Plan  I spoke with psychiatrist, medications adjusted.  -We will continue lithium 450 mg and 600 mg dose.    - I reviewed lithium level, 0.4, low.      - Continue Valium as needed for anxiety, and Geodon as needed for severe agitation  Haldol was discontinued  - We will continue with transfer to inpatient psych facility    Amphetamine abuse (HCC)- (present on admission)  Assessment & Plan  UDS positive    Rhabdomyolysis- (present on admission)  Assessment & Plan  - may have been due to agitation, but seroquel overdose may cause  - 1729 on admission, last check was 171  - pt moving/ambulating without pain    Leukocytosis- (present on admission)  Assessment & Plan  - Likely reactive.  Resolved.  No signs of infection.  Holding off on antibiotics.    Intentional overdose  (HCC)- (present on admission)  Assessment & Plan  - In an apparent suicide attempt.    - Maintain on suicide/homicide/elopement precaution.    - Continue one-on-one supervision.  - Patient met with  via Zoom, legal hold was continued.      [facility] placement.  Complicated discharge as facility is working at half capacity as per case management.  -Still pending potential transfer to inpatient psychiatric facility         VTE prophylaxis:    Xarelto 10mg daily as prophylaxis  Patient has been declining for DVT prophylaxis    I have performed a physical exam and reviewed and updated ROS and Plan today (10/1/2023). In review of yesterday's note (9/30/2023), there are no changes except as documented above.      Total time spent 37 minutes.    This included my review of patient's overnight RN notes, face to face interview, physical examination, lab analysis.  Also includes my documented assessments and interventions above.  In addition, I spoke with entire care team on patient's treatment plan and DC planning on IDT rounds.

## 2023-10-01 NOTE — PROGRESS NOTES
Received bedside report from day shift ASHUTOSH Aldrich at 19:25. Assumed pt care.   Pt seen AO4, pt pacing room. No pain and nausea reported at this time.   Safety and fall precautions in place.   Bed locked and lowest position. Security as safety sitter continued. Whiteboard updated.  No other needs reported at this time.   Implementation of POC in progress.

## 2023-10-02 PROCEDURE — A9270 NON-COVERED ITEM OR SERVICE: HCPCS | Performed by: STUDENT IN AN ORGANIZED HEALTH CARE EDUCATION/TRAINING PROGRAM

## 2023-10-02 PROCEDURE — 700102 HCHG RX REV CODE 250 W/ 637 OVERRIDE(OP): Performed by: INTERNAL MEDICINE

## 2023-10-02 PROCEDURE — 99232 SBSQ HOSP IP/OBS MODERATE 35: CPT | Performed by: INTERNAL MEDICINE

## 2023-10-02 PROCEDURE — A9270 NON-COVERED ITEM OR SERVICE: HCPCS | Performed by: INTERNAL MEDICINE

## 2023-10-02 PROCEDURE — 94760 N-INVAS EAR/PLS OXIMETRY 1: CPT

## 2023-10-02 PROCEDURE — 770001 HCHG ROOM/CARE - MED/SURG/GYN PRIV*

## 2023-10-02 PROCEDURE — 700102 HCHG RX REV CODE 250 W/ 637 OVERRIDE(OP): Performed by: STUDENT IN AN ORGANIZED HEALTH CARE EDUCATION/TRAINING PROGRAM

## 2023-10-02 RX ADMIN — NICOTINE 14 MG: 14 PATCH TRANSDERMAL at 05:05

## 2023-10-02 RX ADMIN — LITHIUM CARBONATE 600 MG: 300 TABLET, EXTENDED RELEASE ORAL at 09:55

## 2023-10-02 RX ADMIN — SENNOSIDES AND DOCUSATE SODIUM 2 TABLET: 50; 8.6 TABLET ORAL at 05:06

## 2023-10-02 RX ADMIN — LITHIUM CARBONATE 600 MG: 300 TABLET, EXTENDED RELEASE ORAL at 21:06

## 2023-10-02 RX ADMIN — Medication 1 TABLET: at 05:05

## 2023-10-02 RX ADMIN — THIAMINE HCL TAB 100 MG 100 MG: 100 TAB at 05:06

## 2023-10-02 RX ADMIN — DIAZEPAM 5 MG: 5 TABLET ORAL at 05:05

## 2023-10-02 ASSESSMENT — PAIN DESCRIPTION - PAIN TYPE
TYPE: ACUTE PAIN
TYPE: ACUTE PAIN

## 2023-10-02 ASSESSMENT — ENCOUNTER SYMPTOMS: NERVOUS/ANXIOUS: 1

## 2023-10-02 NOTE — CARE PLAN
The patient is Stable - Low risk of patient condition declining or worsening    Shift Goals  Clinical Goals: monitor lithium levels, ensure safety measures are in place  Patient Goals: rest and get results on tests  Family Goals: n/a    Progress made toward(s) clinical / shift goals: patient was compliant with new dosing on lithium, understands the importance of making sure to take the medication. Patient still has 1:1 sitter at bedside and understands the importance of their presence.    Patient is not progressing towards the following goals: n/a

## 2023-10-02 NOTE — PROGRESS NOTES
1923 - Bedside report received from ASHUTOSH Munoz. Alert and oriented X4, on RA in no acute respiratory distress. Daily plan of care discussed. Patient complains of no pain, patient is requesting nicotine gum, medicated per MAR. No other needs at this time. Call light and personal belongings within reach. Hourly rounding in place. Chart reviewed for recent labs, notes, and orders.

## 2023-10-02 NOTE — DISCHARGE PLANNING
Alert Team Note:    Submitted updated MAR, hold extension, progress and psych notes to [facility] via [facility]. No bed availability at this time.

## 2023-10-02 NOTE — CARE PLAN
The patient is Stable - Low risk of patient condition declining or worsening    Shift Goals  Clinical Goals: ensure safety measures are in place, monitor labs  Patient Goals: comfort and safety  Family Goals: n/a    Progress made toward(s) clinical / shift goals:  Patient was able to  remain safe with all safety measures checked and in place. Laboratory as kept monitored.     Patient is not progressing towards the following goals: n/a

## 2023-10-02 NOTE — PROGRESS NOTES
Received bedside report from NOC, RN. Assumed care. Pt is A&O 4. Pt has no complaints of pain. Safety precaution in place, call button and belongings placed within reach. All needs met at this time. Hourly rounding in place.

## 2023-10-02 NOTE — DISCHARGE PLANNING
LSW met with pt at bedside per his request. Pt requested that this LSW call Den[facility] and [facility] to inform them that he has been in the hospital. Informed pt I am unable to call, however provided pt with their numbers so that he may be able to notify them himself.

## 2023-10-03 LAB — LITHIUM SERPL-MCNC: 0.6 MMOL/L (ref 0.6–1.2)

## 2023-10-03 PROCEDURE — A9270 NON-COVERED ITEM OR SERVICE: HCPCS | Performed by: INTERNAL MEDICINE

## 2023-10-03 PROCEDURE — 99232 SBSQ HOSP IP/OBS MODERATE 35: CPT | Performed by: INTERNAL MEDICINE

## 2023-10-03 PROCEDURE — A9270 NON-COVERED ITEM OR SERVICE: HCPCS | Performed by: STUDENT IN AN ORGANIZED HEALTH CARE EDUCATION/TRAINING PROGRAM

## 2023-10-03 PROCEDURE — 700102 HCHG RX REV CODE 250 W/ 637 OVERRIDE(OP): Performed by: INTERNAL MEDICINE

## 2023-10-03 PROCEDURE — 700102 HCHG RX REV CODE 250 W/ 637 OVERRIDE(OP): Performed by: STUDENT IN AN ORGANIZED HEALTH CARE EDUCATION/TRAINING PROGRAM

## 2023-10-03 PROCEDURE — 36415 COLL VENOUS BLD VENIPUNCTURE: CPT

## 2023-10-03 PROCEDURE — 770001 HCHG ROOM/CARE - MED/SURG/GYN PRIV*

## 2023-10-03 PROCEDURE — 80178 ASSAY OF LITHIUM: CPT

## 2023-10-03 RX ADMIN — SENNOSIDES AND DOCUSATE SODIUM 2 TABLET: 50; 8.6 TABLET ORAL at 08:22

## 2023-10-03 RX ADMIN — DIAZEPAM 5 MG: 5 TABLET ORAL at 08:22

## 2023-10-03 RX ADMIN — DIAZEPAM 5 MG: 5 TABLET ORAL at 20:51

## 2023-10-03 RX ADMIN — NICOTINE 14 MG: 14 PATCH TRANSDERMAL at 08:22

## 2023-10-03 RX ADMIN — Medication 1 TABLET: at 08:22

## 2023-10-03 RX ADMIN — NICOTINE POLACRILEX 2 MG: 2 GUM, CHEWING BUCCAL at 11:08

## 2023-10-03 RX ADMIN — THIAMINE HCL TAB 100 MG 100 MG: 100 TAB at 08:22

## 2023-10-03 RX ADMIN — LITHIUM CARBONATE 600 MG: 300 TABLET, EXTENDED RELEASE ORAL at 08:22

## 2023-10-03 RX ADMIN — SENNOSIDES AND DOCUSATE SODIUM 2 TABLET: 50; 8.6 TABLET ORAL at 17:24

## 2023-10-03 RX ADMIN — BISACODYL 5 MG: 5 TABLET, COATED ORAL at 08:22

## 2023-10-03 RX ADMIN — DIAZEPAM 5 MG: 5 TABLET ORAL at 00:23

## 2023-10-03 RX ADMIN — LITHIUM CARBONATE 600 MG: 300 TABLET, EXTENDED RELEASE ORAL at 19:36

## 2023-10-03 ASSESSMENT — ENCOUNTER SYMPTOMS: NERVOUS/ANXIOUS: 1

## 2023-10-03 ASSESSMENT — PAIN DESCRIPTION - PAIN TYPE
TYPE: ACUTE PAIN
TYPE: ACUTE PAIN

## 2023-10-03 ASSESSMENT — PATIENT HEALTH QUESTIONNAIRE - PHQ9
SUM OF ALL RESPONSES TO PHQ9 QUESTIONS 1 AND 2: 0
2. FEELING DOWN, DEPRESSED, IRRITABLE, OR HOPELESS: NOT AT ALL
1. LITTLE INTEREST OR PLEASURE IN DOING THINGS: NOT AT ALL

## 2023-10-03 NOTE — PROGRESS NOTES
Bedside report received from ASHUTOSH Mejia. Assumed care of patient. Daily plan of care discussed. Pt restless, requesting to be seen by Psychiatry today. Pt requesting Valium, but dose not due at this time, will re-evaluate need around due time for next dose. Pt requesting all meds to be given at 0830. Breathing even and unlabored. Patient waiting on placement. Patient reports no further needs at this time. Call light within reach. Bed locked in lowest position. Plan of care on going.    Security Present

## 2023-10-03 NOTE — CARE PLAN
The patient is Stable - Low risk of patient condition declining or worsening    Shift Goals  Clinical Goals: Patient will be free from falls, ensure safety measures in place, monitor lithium levels  Patient Goals: Rest  Family Goals: n/a    Progress made toward(s) clinical / shift goals:  Patient safe from falls, safety measures in place, 1:1 sitter in place.     Patient is not progressing towards the following goals: N/A

## 2023-10-03 NOTE — PROGRESS NOTES
Hospital Medicine Daily Progress Note    Date of Service  10/3/2023    Chief Complaint  Peter Grace is a 38 y.o. male admitted 9/15/2023 with   Chief Complaint   Patient presents with    ALOC           Drug Overdose    Legal 2000     Patient BIB EMS with legal hold initiated by [employer]. Patient reports taking 15-20 pills of Seraquil. Patient combative in triage, arrived in four point restraints by EMS.      Hospital Course  No notes on file    Interval Problem Update  9/26:  Patient had no acute complaints.  - I reviewed psych notes, lithium was increased, will need to continue monitoring levels. Last lithium level was 0.7.  - legal hold was extended, pending inpatient psychiatric facility for discharge.  - I ordered ECG to recheck Qtc    9/27:  -I reviewed EKG, QTc appropriate for medications.  - I reviewed psychiatry notes, legal hold not discontinued.  We are pending court date.  We will continue current psychiatric medications.  - Continue sitter  -I reviewed labs, potassium 4.5, creatinine 1.15, magnesium 2.1.  Sodium 137.    9/28:  Patient did not want to take his medications today other than lithium.  We continue to encourage him to continue his medication as per psychiatry team recommendations, but he feels that it makes him too slow to speak but at this moment he is stuttering.  - Patient met with , legal hold was continued.  We are pending [facility] placement.  Complicated discharge as facility is working at half capacity as per case management.  -I spoke with psychiatry team, we will continue current medications with lithium, Haldol and Risperdal.  I changed Valium to PRN.    9/29:  Patient had no acute complaints but was asking for Valium.  - I spoke with psychiatry, we will discontinue Risperdal, changing Haldol to 10 mg at bedtime and continue lithium with lithium level checked tomorrow.  We are still pending for psychiatric facility bed availability but this may take time.    9/30:  Patient  stated he was eager to see if encouraged she will discontinue his legal hold if he continues to take his medications.  - I spoke with psychiatrist, we are discontinuing oral Haldol at bedtime.  We will continue lithium, pending lithium levels.  - We will continue with transfer to inpatient psych facility    10/01:  Patient had no acute complaints  - I reviewed lithium level, 0.4, low, dose adjusted    10/3: Lithium therapeutic, feeling anxious to DC.  Legal hold still in place for now.  Discussed with psych.  No IP psych beds available    I have discussed this patient's plan of care and discharge plan at IDT rounds today with Case Management, Nursing, Nursing leadership, and other members of the IDT team.    Consultants/Specialty  critical care and psychiatry    Code Status  Full Code    Disposition  The patient is not medically cleared for discharge to home or a post-acute facility.  Anticipate discharge to: a psychiatric hospital    I have placed the appropriate orders for post-discharge needs.    Review of Systems  Review of Systems   Psychiatric/Behavioral:  Negative for suicidal ideas. The patient is nervous/anxious.    All other systems reviewed and are negative.       Physical Exam  Temp:  [37 °C (98.6 °F)] 37 °C (98.6 °F)  Pulse:  [92] 92  Resp:  [18] 18  BP: (104)/(65) 104/65  SpO2:  [97 %] 97 %    Physical Exam  Vitals and nursing note reviewed.   Constitutional:       General: He is not in acute distress.  HENT:      Mouth/Throat:      Mouth: Mucous membranes are dry.      Pharynx: No oropharyngeal exudate.   Cardiovascular:      Rate and Rhythm: Normal rate and regular rhythm.      Pulses: Normal pulses.      Heart sounds: Normal heart sounds.   Pulmonary:      Effort: Pulmonary effort is normal. No respiratory distress.      Breath sounds: Normal breath sounds.   Abdominal:      General: Bowel sounds are normal. There is no distension.   Musculoskeletal:         General: No swelling. Normal range of  motion.   Skin:     General: Skin is warm.      Coloration: Skin is not jaundiced or pale.   Neurological:      Mental Status: He is alert and oriented to person, place, and time. Mental status is at baseline.      Motor: No weakness.   Psychiatric:         Mood and Affect: Mood is anxious.         Speech: Speech is rapid and pressured.         Fluids    Intake/Output Summary (Last 24 hours) at 10/3/2023 1443  Last data filed at 10/3/2023 0800  Gross per 24 hour   Intake 3060 ml   Output --   Net 3060 ml         Laboratory                            Imaging  No orders to display        Assessment/Plan  * Toxic encephalopathy- (present on admission)  Assessment & Plan  - Due to intentional drug overdose in an apparent suicide attempt.    Had to be temporarily placed on Precedex drip to control behavior. Downgraded on 9/24.    Mentation back to likely new baseline    Bipolar affective (HCC)- (present on admission)  Assessment & Plan  I spoke with psychiatrist, medications adjusted.  -We will continue lithium 450 mg and 600 mg dose- level is therapeutic    - Continue Valium as needed for anxiety, and Geodon as needed for severe agitation  Haldol was discontinued  Continue legal hold for now until he is determined safe for discharge  -As per psychiatry note, patient is at goal with lithium levels, it is a potential patient may end up discharging as there are no beds available at [facility].    Amphetamine abuse (HCC)- (present on admission)  Assessment & Plan  UDS positive    Rhabdomyolysis- (present on admission)  Assessment & Plan  Now resolved  - may have been due to agitation, but seroquel overdose may cause  - 1729 on admission, last check was 171  - pt moving/ambulating without pain    Leukocytosis- (present on admission)  Assessment & Plan  Reactive, resolved    Intentional overdose (HCC)- (present on admission)  Assessment & Plan  - In an apparent suicide attempt.    - Maintain on suicide/homicide/elopement  precaution.    - Continue one-on-one supervision.  - Patient met with  via Zoom, legal hold was continued.    -Psych is considering discontinuation of legal hold now that lithium is therapeutic.     [facility] vs [facility] placement but  Placement but beds are limited  Continue legal hold for now and continue to eval safe DC plan         VTE prophylaxis: Patient has been declining for DVT prophylaxis    I have performed a physical exam and reviewed and updated ROS and Plan today (10/3/2023). In review of yesterday's note (10/2/2023), there are no changes except as documented above.    Total time:  46 minutes.  I spent greater than 50% of the time for patient care, counseling, and coordination on this date, including unit/floor time, and face-to-face time with the patient as per interval events and assessment and plan above

## 2023-10-03 NOTE — PROGRESS NOTES
Hospital Medicine Daily Progress Note    Date of Service  10/2/2023    Chief Complaint  Peter Grace is a 38 y.o. male admitted 9/15/2023 with   Chief Complaint   Patient presents with    ALOC           Drug Overdose    Legal 2000     Patient BIB EMS with legal hold initiated by [employer]. Patient reports taking 15-20 pills of Seraquil. Patient combative in triage, arrived in four point restraints by EMS.      Hospital Course  No notes on file    Interval Problem Update  9/26:  Patient had no acute complaints.  - I reviewed psych notes, lithium was increased, will need to continue monitoring levels. Last lithium level was 0.7.  - legal hold was extended, pending inpatient psychiatric facility for discharge.  - I ordered ECG to recheck Qtc    9/27:  -I reviewed EKG, QTc appropriate for medications.  - I reviewed psychiatry notes, legal hold not discontinued.  We are pending court date.  We will continue current psychiatric medications.  - Continue sitter  -I reviewed labs, potassium 4.5, creatinine 1.15, magnesium 2.1.  Sodium 137.    9/28:  Patient did not want to take his medications today other than lithium.  We continue to encourage him to continue his medication as per psychiatry team recommendations, but he feels that it makes him too slow to speak but at this moment he is stuttering.  - Patient met with , legal hold was continued.  We are pending [facility] placement.  Complicated discharge as facility is working at half capacity as per case management.  -I spoke with psychiatry team, we will continue current medications with lithium, Haldol and Risperdal.  I changed Valium to PRN.    9/29:  Patient had no acute complaints but was asking for Valium.  - I spoke with psychiatry, we will discontinue Risperdal, changing Haldol to 10 mg at bedtime and continue lithium with lithium level checked tomorrow.  We are still pending for psychiatric facility bed availability but this may take time.    9/30:  Patient  stated he was eager to see if encouraged she will discontinue his legal hold if he continues to take his medications.  - I spoke with psychiatrist, we are discontinuing oral Haldol at bedtime.  We will continue lithium, pending lithium levels.  - We will continue with transfer to inpatient psych facility    10/01:  Patient had no acute complaints  - I reviewed lithium level, 0.4, low.  We will need to reach out to psychiatry if they wish to increase lithium to 600 mg twice daily.    10/02:  Patient had no acute complaints, he wanted to have daily lithium level checks.  He is eager to see if he becomes therapeutic and psychiatry will discharge him as it is taking a while to get any psychiatric beds.    I have discussed this patient's plan of care and discharge plan at IDT rounds today with Case Management, Nursing, Nursing leadership, and other members of the IDT team.    Consultants/Specialty  critical care and psychiatry    Code Status  Full Code    Disposition  The patient is medically cleared for discharge to home or a post-acute facility.  Anticipate discharge to: a psychiatric hospital    I have placed the appropriate orders for post-discharge needs.    Review of Systems  Review of Systems   Psychiatric/Behavioral:  Negative for suicidal ideas. The patient is nervous/anxious.    All other systems reviewed and are negative.       Physical Exam  Temp:  [36.5 °C (97.7 °F)] 36.5 °C (97.7 °F)  Pulse:  [93-94] 93  Resp:  [18] 18  BP: (136)/(84) 136/84  SpO2:  [93 %-94 %] 94 %    Physical Exam  Vitals and nursing note reviewed.   Constitutional:       General: He is not in acute distress.  HENT:      Mouth/Throat:      Mouth: Mucous membranes are dry.      Pharynx: No oropharyngeal exudate.   Cardiovascular:      Rate and Rhythm: Normal rate and regular rhythm.      Pulses: Normal pulses.      Heart sounds: Normal heart sounds.   Pulmonary:      Effort: Pulmonary effort is normal. No respiratory distress.      Breath  sounds: Normal breath sounds.   Abdominal:      General: Bowel sounds are normal. There is no distension.   Musculoskeletal:         General: No swelling. Normal range of motion.   Skin:     General: Skin is warm.      Coloration: Skin is not jaundiced or pale.   Neurological:      Mental Status: He is alert and oriented to person, place, and time. Mental status is at baseline.      Motor: No weakness.   Psychiatric:         Mood and Affect: Mood normal.         Behavior: Behavior normal.         Fluids    Intake/Output Summary (Last 24 hours) at 10/2/2023 1733  Last data filed at 10/2/2023 1600  Gross per 24 hour   Intake 2840 ml   Output --   Net 2840 ml       Laboratory  Recent Labs     09/30/23  0939   WBC 9.5   RBC 4.52*   HEMOGLOBIN 14.6   HEMATOCRIT 43.1   MCV 95.4   MCH 32.3   MCHC 33.9   RDW 42.5   PLATELETCT 331   MPV 8.8*     Recent Labs     09/30/23  0939   SODIUM 138   POTASSIUM 4.4   CHLORIDE 103   CO2 27   GLUCOSE 90   BUN 17   CREATININE 1.08   CALCIUM 9.6                   Imaging  No orders to display        Assessment/Plan  * Toxic encephalopathy- (present on admission)  Assessment & Plan  - Due to intentional drug overdose in an apparent suicide attempt.    Had to be temporarily placed on Precedex drip to control behavior. Downgraded on 9/24.    Mentation back to likely new baseline    Bipolar affective (HCC)- (present on admission)  Assessment & Plan  I spoke with psychiatrist, medications adjusted.  -We will continue lithium 450 mg and 600 mg dose.    - I reviewed lithium level, 0.4, low.      - Continue Valium as needed for anxiety, and Geodon as needed for severe agitation  Haldol was discontinued  - We will continue with transfer to inpatient psych facility  -Ordered daily lithium levels as per patient's request  -As per psychiatry note, patient is at goal with lithium levels, it is a potential patient may end up discharging as there are no beds available at [facility].    Amphetamine abuse  (HCC)- (present on admission)  Assessment & Plan  UDS positive    Rhabdomyolysis- (present on admission)  Assessment & Plan  - may have been due to agitation, but seroquel overdose may cause  - 1729 on admission, last check was 171  - pt moving/ambulating without pain    Leukocytosis- (present on admission)  Assessment & Plan  - Likely reactive.  Resolved.  No signs of infection.  Holding off on antibiotics.    Intentional overdose (HCC)- (present on admission)  Assessment & Plan  - In an apparent suicide attempt.    - Maintain on suicide/homicide/elopement precaution.    - Continue one-on-one supervision.  - Patient met with  via Zoom, legal hold was continued.      [facility]  Complicated discharge as facility is working at half capacity as per case management.  -Still pending potential transfer to inpatient psychiatric facility         VTE prophylaxis:   SCDs/TEDs  Patient has been declining for DVT prophylaxis    I have performed a physical exam and reviewed and updated ROS and Plan today (10/2/2023). In review of yesterday's note (10/1/2023), there are no changes except as documented above.      Total time spent 37 minutes.    This included my review of patient's overnight RN notes, face to face interview, physical examination, lab analysis.  Also includes my documented assessments and interventions above.  In addition, I spoke with entire care team on patient's treatment plan and DC planning on IDT rounds.

## 2023-10-03 NOTE — CONSULTS
"[facility]  PSYCHIATRIC CONSULTATION - Follow-up  Established Patient    DOS: 10/03/23     Reason for Admission:   reported taking 15-20 pills of seroquel. Was combative. He had a prescription bottle with him that was filled 3 days ago that was empty per report.  Police were called as the patient was reported as trespassing and when he was in the police car patient started screaming that he was suicidal and that he overdosed on Seroquel and EMS was called and the patient requested to go to a different facility other than [facility]. The most he could have taken was around 1,250 mg.    Legal Hold Status: on legal hold - court    CC:   Chief Complaint   Patient presents with    ALOC           Drug Overdose    Legal 2000     Patient BIB EMS with legal hold initiated by [employer]. Patient reports taking 15-20 pills of Seraquil. Patient combative in triage, arrived in four point restraints by EMS.                S:   Observed awake, calm, able to sit still throughout encounter. Pressured speech, was able to maintain thought process without prompts. Able to recall the situation leading to hospitalization, expressing entire situation was hallucinations, delusional thinking, PTSD. Reports improved sleep, adequate energy, appetite, improved mood, denies SI/HI, no Sx of psychosis/ceci reported or observed. Less delayed speech than previous encounter. Able to vocalize safe d/c plan. Does appear to be more stable, trending towards baseline, monitor Lithium level, may be appropriate for d/c with appropriate Bx, medication adherence, safe plan. Tolerating medications, denies GI distress, HA, dizziness.    O:   Medical ROS (as pertinent):   No results for input(s): \"WBC\", \"RBC\", \"HEMOGLOBIN\", \"HEMATOCRIT\", \"MCV\", \"MCH\", \"RDW\", \"PLATELETCT\", \"MPV\", \"NEUTSPOLYS\", \"LYMPHOCYTES\", \"MONOCYTES\", \"EOSINOPHILS\", \"BASOPHILS\", \"RBCMORPHOLO\" in the last 72 hours.  No results for input(s): \"SODIUM\", \"POTASSIUM\", " "\"CHLORIDE\", \"CO2\", \"GLUCOSE\", \"BUN\", \"CPKTOTAL\" in the last 72 hours.  No results for input(s): \"ALBUMIN\", \"TBILIRUBIN\", \"ALKPHOSPHAT\", \"TOTPROTEIN\", \"ALTSGPT\", \"ASTSGOT\", \"CREATININE\" in the last 72 hours.    EKG:   Results for orders placed or performed during the hospital encounter of 09/15/23   EKG (NOW)   Result Value Ref Range    Report       [facility] Emergency Dept.    Test Date:  2023-09-15  Pt Name:    MARTY CHRISTIANSON             Department: [facility]  MRN:        [id number]                      Room:       [place] [address]  Gender:     Male                         Technician: [id number]  :        1985                   Requested By:FELICITY RUIZ  Order #:    [id number]                    Reading MD: Felicity Ruiz    Measurements  Intervals                                Axis  Rate:       104                          P:          55  NV:         138                          QRS:        57  QRSD:       87                           T:          41  QT:         338  QTc:        445    Interpretive Statements  Sinus tachycardia  Borderline low voltage, extremity leads  Baseline wander in lead(s) I,II,aVR,V1,V2,V3,V6  No previous ECG available for comparison  Electronically Signed On 2023 00:00:19 PDT by Felicity Ruiz     EKG (NOW)   Result Value Ref Range    Report       [facility] Emergency Dept.    Test Date:  2023  Pt Name:    MARTY CHRISTIANSON             Department: [facility]  MRN:        [id number]                      Room:       [facility]  Gender:     Male                         Technician: [id number]  :        1985                   Requested By:FELICITY RUIZ  Order #:    [id number]                    Reading MD: Felicity Ruiz    Measurements  Intervals                                Axis  Rate:       94                           P:          66  NV:         141                          QRS:        56  QRSD:       79                     "       T:          66  QT:         361  QTc:        452    Interpretive Statements  Sinus rhythm  Probable left atrial enlargement  Baseline wander in lead(s) V2  Compared to ECG 09/15/2023 23:52:48  Sinus tachycardia no longer present  Electronically Signed On 2023 04:35:24 PDT by Geovanni Briseno     EKG   Result Value Ref Range    Report       [facility]    Test Date:  2023  Pt Name:    MARTY CHRISTIANSON             Department: EDS  MRN:        [id number]                      Room:       [facility]  Gender:     Male                         Technician: [id number]  :        1985                   Requested By:TIMOTHY CARTER  Order #:    [id number]                    Reading MD: Lynn Cervantes    Measurements  Intervals                                Axis  Rate:       49                           P:          47  AR:         142                          QRS:        45  QRSD:       90                           T:          68  QT:         483  QTc:        437    Interpretive Statements  Sinus bradycardia, 49 bpm  Compared to ECG 2023 04:20:02  Rate is slower and does not meet criteria for left atrial enlargement  Electronically Signed On 2023 06:19:08 PDT by Lynn Cervantes     EKG   Result Value Ref Range    Report       [facility]    Test Date:  2023  Pt Name:    MARTY CHRISTIANSON             Department: EDS  MRN:        [id number]                      Room:       3321  Gender:     Male                         Technician: [id number]  :        1985                   Requested By:TIMOTHY CARTER  Order #:    [id number]                    Reading MD: Lynn Cervantes    Measurements  Intervals                                Axis  Rate:       50                           P:          49  AR:         143                          QRS:        68  QRSD:       90                           T:          72  QT:         492  QTc:        449    Interpretive Statements  Sinus bradycardia, 50 bpm  Compared  to ECG 2023 18:33:05  No significant changes  Electronically Signed On 2023 06:33:22 PDT by Lynn Cervantes     EKG   Result Value Ref Range    Report       [facility]    Test Date:  2023  Pt Name:    MARTY CHRISTIANSON             Department: ICU  MRN:        [id number]                      Room:       [facility]  Gender:     Male                         Technician: [id number]  :        1985                   Requested By:TIMOTHY CARTER  Order #:    [id number]                    Reading MD: Lynn Cervantes    Measurements  Intervals                                Axis  Rate:       53                           P:          57  VT:         139                          QRS:        64  QRSD:       83                           T:          64  QT:         467  QTc:        439    Interpretive Statements  Sinus rhythm, 53 bpm  Compared to ECG 2023 22:46:04  No significant changes  Electronically Signed On 2023 06:40:52 PDT by Lynn Cervantes     EKG   Result Value Ref Range    Report       [facility]    Test Date:  2023  Pt Name:    MARTY CHRISTIANSON             Department: ICU  MRN:        [id number]                      Room:       [facility]  Gender:     Male                         Technician: [id number]  :        1985                   Requested By:TIMOTHY CARTER  Order #:    [id number]                    Reading MD: Kiana Velez MD    Measurements  Intervals                                Axis  Rate:       50                           P:          35  VT:         134                          QRS:        45  QRSD:       87                           T:          41  QT:         463  QTc:        423    Interpretive Statements  Incomplete analysis due to missing data in precordial lead(s)  Sinus bradycardia  Missing lead(s): V6  Compared to ECG 2023 03:23:15  Sinus rhythm no longer present  Electronically Signed On 2023 07:21:57 PDT by Kiana Velez MD     EKG (IP)   Result  "Value Ref Range    Report       [facility]    Test Date:  2023  Pt Name:    MATRY CHRISTIANSON             Department: [facility]  MRN:        [id number]                      Room:       2212  Gender:     Male                         Technician: [id number]  :        1985                   Requested By:ATIYA TURK  Order #:    [id number]                    Reading MD: Geovanni Young MD    Measurements  Intervals                                Axis  Rate:       86                           P:          53  AR:         157                          QRS:        59  QRSD:       86                           T:          40  QT:         361  QTc:        432    Interpretive Statements  Sinus rhythm  Compared to ECG 2023 11:22:33  Sinus bradycardia no longer present  Electronically Signed On 2023 23:31:19 PDT by Geovanni Young MD          MSE:   /84   Pulse 93   Temp 36.5 °C (97.7 °F) (Temporal)   Resp 18   Ht 1.803 m (5' 11\")   Wt 97.5 kg (214 lb 15.2 oz)   SpO2 94%     Constitutional: as noted above  General Appearance/Behavior: 38 y.o. appears muscular good eye contact cooperative friendly, No behavioral disturbances  Abnormal Movements: none, no PMA/PMR or tremor observed.  Gait and Posture: not observed  Musculoskeletal: as noted above  Mood: \"okay\"  Affect: Mood/Congruent and Appropriate   Speech: pressured  Language:  spontaneous, comprehends spoken commands, and fluent   Thought Process: Logical and Goal Directed, Future Oriented  Thought Content: Denies SI/HI, A/VH. No e/o delusions, or internal preoccupation  Insight/Judgement:  fair  Alert/Orientation: alert, oriented to person, place and time  Attn/Concentration: short attention span  MMSE: deferred this visit     Medications:  Scheduled Medications   Medication Dose Frequency    lithium carbonate ER  600 mg DAILY    senna-docusate  2 Tablet BID    rivaroxaban  10 mg DAILY AT 1800    thiamine  100 mg DAILY    vitamin B comp+C  1 " Tablet DAILY    lithium carbonate ER  600 mg Nightly    nicotine  1 Patch Daily-0600       Allergies:   No Known Allergies     Assessment:   Diagnosis:   1. Intentional overdose, initial encounter (HCC) Acute   2. Psychosis, unspecified psychosis type (HCC) Acute   3. Polysubstance abuse (HCC) Acute   4. Suicidal ideation Acute   5. Non-compliance Acute   6. Methamphetamine abuse (HCC) Acute        Psychiatric:   Schizoaffective disorder, bipolar type, current hypomania  Stimulant use disorder - meth  Cannabis use      Medical: as noted by the medical treatment team.      Recommendations:  Legal Status: on legal hold - court     Please transfer patient to inpatient psychiatric hospital when medically cleared and bed is available.     Observation status:   - Line of site with sitter     Phone: Yes - Okay to use at nursing availability/discretion.   Visitors: No   Personal belongings: Yes, books okay to use at nursing availability/discretion.     Discussed/voalted: Elinor BOYKIN, MARYELLEN Mccormick DO    Medication Recommendations: Final orders as per Treatment Team  Continue Lithium 600mg BID  Risks/benefits/side effects discussed, patient verbalized understanding.  Medication reconciliation was completed.    Reviewed safety plan: 911, ER, PCM, MHC, suicide crisis line, nursing staff while inpatient.    Will Continue to Follow. Thank you for the consult.       Discharge recommendations:   Please provide referrals to outpatient psychiatric services in the community.  Please provide referrals for substance programs in the community.

## 2023-10-03 NOTE — PROGRESS NOTES
"1915 - Received bedside patient report from ASHUTOSH De La Cruz. Patient is awake. Patient on room air. Patient educated on call light use for needs. Belongings within reach. Bed at lowest position. Safety precautions in place. Safety sitter 1:1 in place.    Chart check complete.     2115 - Patient refused vital signs to be taken.    0600 - Patient refused vital signs to be taken and to take morning medications at this time, patient said \"I will ask for them later.\"  "

## 2023-10-03 NOTE — DISCHARGE PLANNING
CMSW BRIEF NOTES:    [facility] services ongoing  Patient is medically cleared.   No beds at [facility]  available, at this time.  ALERT team is still on the case.    [facility] will continue to collaborate with ALERT team/ Medical team to assist for safe disposition.

## 2023-10-04 LAB
ALBUMIN SERPL BCP-MCNC: 3.9 G/DL (ref 3.2–4.9)
BASOPHILS # BLD AUTO: 0.5 % (ref 0–1.8)
BASOPHILS # BLD: 0.06 K/UL (ref 0–0.12)
BUN SERPL-MCNC: 21 MG/DL (ref 8–22)
CALCIUM ALBUM COR SERPL-MCNC: 9.1 MG/DL (ref 8.5–10.5)
CALCIUM SERPL-MCNC: 9 MG/DL (ref 8.4–10.2)
CHLORIDE SERPL-SCNC: 105 MMOL/L (ref 96–112)
CO2 SERPL-SCNC: 22 MMOL/L (ref 20–33)
CREAT SERPL-MCNC: 1.22 MG/DL (ref 0.5–1.4)
EOSINOPHIL # BLD AUTO: 0.4 K/UL (ref 0–0.51)
EOSINOPHIL NFR BLD: 3.6 % (ref 0–6.9)
ERYTHROCYTE [DISTWIDTH] IN BLOOD BY AUTOMATED COUNT: 43.1 FL (ref 35.9–50)
GFR SERPLBLD CREATININE-BSD FMLA CKD-EPI: 78 ML/MIN/1.73 M 2
GLUCOSE SERPL-MCNC: 119 MG/DL (ref 65–99)
HCT VFR BLD AUTO: 44.1 % (ref 42–52)
HGB BLD-MCNC: 14.7 G/DL (ref 14–18)
IMM GRANULOCYTES # BLD AUTO: 0.05 K/UL (ref 0–0.11)
IMM GRANULOCYTES NFR BLD AUTO: 0.5 % (ref 0–0.9)
LITHIUM SERPL-MCNC: 0.6 MMOL/L (ref 0.6–1.2)
LYMPHOCYTES # BLD AUTO: 2.78 K/UL (ref 1–4.8)
LYMPHOCYTES NFR BLD: 25 % (ref 22–41)
MAGNESIUM SERPL-MCNC: 1.8 MG/DL (ref 1.5–2.5)
MCH RBC QN AUTO: 32.1 PG (ref 27–33)
MCHC RBC AUTO-ENTMCNC: 33.3 G/DL (ref 32.3–36.5)
MCV RBC AUTO: 96.3 FL (ref 81.4–97.8)
MONOCYTES # BLD AUTO: 0.45 K/UL (ref 0–0.85)
MONOCYTES NFR BLD AUTO: 4.1 % (ref 0–13.4)
NEUTROPHILS # BLD AUTO: 7.37 K/UL (ref 1.82–7.42)
NEUTROPHILS NFR BLD: 66.3 % (ref 44–72)
NRBC # BLD AUTO: 0 K/UL
NRBC BLD-RTO: 0 /100 WBC (ref 0–0.2)
PHOSPHATE SERPL-MCNC: 3.3 MG/DL (ref 2.5–4.5)
PLATELET # BLD AUTO: 328 K/UL (ref 164–446)
PMV BLD AUTO: 8.8 FL (ref 9–12.9)
POTASSIUM SERPL-SCNC: 3.8 MMOL/L (ref 3.6–5.5)
RBC # BLD AUTO: 4.58 M/UL (ref 4.7–6.1)
SODIUM SERPL-SCNC: 140 MMOL/L (ref 135–145)
WBC # BLD AUTO: 11.1 K/UL (ref 4.8–10.8)

## 2023-10-04 PROCEDURE — 700102 HCHG RX REV CODE 250 W/ 637 OVERRIDE(OP): Performed by: INTERNAL MEDICINE

## 2023-10-04 PROCEDURE — 80069 RENAL FUNCTION PANEL: CPT

## 2023-10-04 PROCEDURE — 36415 COLL VENOUS BLD VENIPUNCTURE: CPT

## 2023-10-04 PROCEDURE — A9270 NON-COVERED ITEM OR SERVICE: HCPCS | Performed by: INTERNAL MEDICINE

## 2023-10-04 PROCEDURE — 99232 SBSQ HOSP IP/OBS MODERATE 35: CPT | Performed by: INTERNAL MEDICINE

## 2023-10-04 PROCEDURE — 700102 HCHG RX REV CODE 250 W/ 637 OVERRIDE(OP): Performed by: STUDENT IN AN ORGANIZED HEALTH CARE EDUCATION/TRAINING PROGRAM

## 2023-10-04 PROCEDURE — 80178 ASSAY OF LITHIUM: CPT

## 2023-10-04 PROCEDURE — 94760 N-INVAS EAR/PLS OXIMETRY 1: CPT

## 2023-10-04 PROCEDURE — 83735 ASSAY OF MAGNESIUM: CPT

## 2023-10-04 PROCEDURE — A9270 NON-COVERED ITEM OR SERVICE: HCPCS | Performed by: STUDENT IN AN ORGANIZED HEALTH CARE EDUCATION/TRAINING PROGRAM

## 2023-10-04 PROCEDURE — 85025 COMPLETE CBC W/AUTO DIFF WBC: CPT

## 2023-10-04 PROCEDURE — 770001 HCHG ROOM/CARE - MED/SURG/GYN PRIV*

## 2023-10-04 RX ADMIN — RIVAROXABAN 10 MG: 10 TABLET, FILM COATED ORAL at 17:00

## 2023-10-04 RX ADMIN — LITHIUM CARBONATE 600 MG: 300 TABLET, EXTENDED RELEASE ORAL at 08:14

## 2023-10-04 RX ADMIN — NICOTINE POLACRILEX 2 MG: 2 GUM, CHEWING BUCCAL at 19:49

## 2023-10-04 RX ADMIN — SENNOSIDES AND DOCUSATE SODIUM 2 TABLET: 50; 8.6 TABLET ORAL at 17:00

## 2023-10-04 RX ADMIN — SENNOSIDES AND DOCUSATE SODIUM 2 TABLET: 50; 8.6 TABLET ORAL at 08:14

## 2023-10-04 RX ADMIN — NICOTINE 14 MG: 14 PATCH TRANSDERMAL at 08:14

## 2023-10-04 RX ADMIN — Medication 1 TABLET: at 08:14

## 2023-10-04 RX ADMIN — THIAMINE HCL TAB 100 MG 100 MG: 100 TAB at 08:14

## 2023-10-04 RX ADMIN — DIAZEPAM 5 MG: 5 TABLET ORAL at 19:47

## 2023-10-04 RX ADMIN — LITHIUM CARBONATE 600 MG: 300 TABLET, EXTENDED RELEASE ORAL at 19:47

## 2023-10-04 ASSESSMENT — ENCOUNTER SYMPTOMS: NERVOUS/ANXIOUS: 1

## 2023-10-04 ASSESSMENT — PAIN DESCRIPTION - PAIN TYPE: TYPE: ACUTE PAIN

## 2023-10-04 NOTE — CARE PLAN
The patient is Stable - Low risk of patient condition declining or worsening    Shift Goals  Clinical Goals: Get discharge  Patient Goals: go home  Family Goals: n/a    Progress made toward(s) clinical / shift goals:  no outburst of anger or inappropriate behavior during this shift    Patient is not progressing towards the following goals:

## 2023-10-04 NOTE — CONSULTS
[facility]  PSYCHIATRIC CONSULTATION - Follow-up  Established Patient    DOS: 10/04/23     Reason for Admission:   reported taking 15-20 pills of seroquel. Was combative. He had a prescription bottle with him that was filled 3 days ago that was empty per report.  Police were called as the patient was reported as trespassing and when he was in the police car patient started screaming that he was suicidal and that he overdosed on Seroquel and EMS was called and the patient requested to go to a different facility other than [facility]. The most he could have taken was around 1,250 mg.    Legal Hold Status: court commitment    CC:   Chief Complaint   Patient presents with    ALOC           Drug Overdose    Legal 2000     Patient BIB EMS with legal hold initiated by [employer]. Patient reports taking 15-20 pills of Seraquil. Patient combative in triage, arrived in four point restraints by EMS.                S:   Observed in bed, easy to wake, calm. Minimally invested in assessment following information about path to safe discharge. Reports adequate sleep, energy, appetite, improved mood. Denies SI/HI, A/VH, no Sx of psychosis/ceci reported or observed. Appears to be tolerating medications, denies GI distress, HA, dizziness. Continues to express a desire to discharge, somewhat perseverative, has been agreeable to Tx plan, expressing a desire to speak with previous psychiatrist for 2nd opinion, education provided r/t available provider.     Patient continues to endorse safe d/c plan to seek availability in shelter locally, and in [place], expressing a desire to enter clean living to maintain sobriety, vocalized efficacy for Lithium, adherent with medication throughout hospitalization. Patient continues to have mood lability, periods of impulsive thinking, has been observed utilizing coping skills to reduce anxiety, no overt periods or aggression reported or observed for at least 72 hours. Patient  remains on civil commitment with plan to transition to IP if available, currently working to manage mood, create safe d/c plan if possible.     O:   Medical ROS (as pertinent):   Recent Labs     10/04/23  0833   WBC 11.1*   RBC 4.58*   HEMOGLOBIN 14.7   HEMATOCRIT 44.1   MCV 96.3   MCH 32.1   RDW 43.1   PLATELETCT 328   MPV 8.8*   NEUTSPOLYS 66.30   LYMPHOCYTES 25.00   MONOCYTES 4.10   EOSINOPHILS 3.60   BASOPHILS 0.50     Recent Labs     10/04/23  0833   SODIUM 140   POTASSIUM 3.8   CHLORIDE 105   CO2 22   GLUCOSE 119*   BUN 21     Recent Labs     10/04/23  0833   ALBUMIN 3.9   CREATININE 1.22       EKG:   Results for orders placed or performed during the hospital encounter of 09/15/23   EKG (NOW)   Result Value Ref Range    Report       [facility] Emergency Dept.    Test Date:  2023-09-15  Pt Name:    MARTY CHRISTIANSON             Department: EDS  MRN:        [id number]                      Room:       [facility] 6  Gender:     Male                         Technician: [id number]  :        1985                   Requested By:FELICITY RUIZ  Order #:    [id number]                    Reading MD: Felicity Ruiz    Measurements  Intervals                                Axis  Rate:       104                          P:          55  PA:         138                          QRS:        57  QRSD:       87                           T:          41  QT:         338  QTc:        445    Interpretive Statements  Sinus tachycardia  Borderline low voltage, extremity leads  Baseline wander in lead(s) I,II,aVR,V1,V2,V3,V6  No previous ECG available for comparison  Electronically Signed On 2023 00:00:19 PDT by Felicity Ruiz     EKG (NOW)   Result Value Ref Range    Report       [facility] Emergency Dept.    Test Date:  2023  Pt Name:    MARTY DILLSTEIN             Department: EDSM  MRN:        [id number]                      [facility]  Gender:     Male                          Technician: jmd  :        1985                   Requested By:FELICITY RUIZ  Order #:    [id number]                    Reading MD: Felicity Ruiz    Measurements  Intervals                                Axis  Rate:       94                           P:          66  NM:         141                          QRS:        56  QRSD:       79                           T:          66  QT:         361  QTc:        452    Interpretive Statements  Sinus rhythm  Probable left atrial enlargement  Baseline wander in lead(s) V2  Compared to ECG 09/15/2023 23:52:48  Sinus tachycardia no longer present  Electronically Signed On 2023 04:35:24 PDT by Felicity Ruiz     EKG   Result Value Ref Range    Report       [facility]    Test Date:  2023  Pt Name:    MARTY CHRISTIANSON             Department: EDS  MRN:        [id number]                      Room:       [facility]  Gender:     Male                         Technician: [id number]  :        1985                   Requested By:TIMOTHY CARTER  Order #:    [id number]                    Reading MD: Lynn Cervantes    Measurements  Intervals                                Axis  Rate:       49                           P:          47  NM:         142                          QRS:        45  QRSD:       90                           T:          68  QT:         483  QTc:        437    Interpretive Statements  Sinus bradycardia, 49 bpm  Compared to ECG 2023 04:20:02  Rate is slower and does not meet criteria for left atrial enlargement  Electronically Signed On 2023 06:19:08 PDT by Lynn Cervantes     EKG   Result Value Ref Range    Report       [facility]    Test Date:  2023  Pt Name:    MARTY CHRISTIANSON             Department: [facility]  MRN:        [id number]                      Room:       [facility]  Gender:     Male                         Technician: [id number]  :        1985                   Requested By:TIMOTHY MENENDEZ  RAUL  Order #:    [id number]                    Reading MD: Lynn Cervantes    Measurements  Intervals                                Axis  Rate:       50                           P:          49  WY:         143                          QRS:        68  QRSD:       90                           T:          72  QT:         492  QTc:        449    Interpretive Statements  Sinus bradycardia, 50 bpm  Compared to ECG 2023 18:33:05  No significant changes  Electronically Signed On 2023 06:33:22 PDT by Lynn Cervantes     EKG   Result Value Ref Range    Report       [facility]    Test Date:  2023  Pt Name:    MARTY CHRISTIANSON             Department: ICU  MRN:        [id number]                      Room:       3321  Gender:     Male                         Technician: [id number]  :        1985                   Requested By:TIMOTHY CARTER  Order #:    [id number]                    Reading MD: Lynn Cervantes    Measurements  Intervals                                Axis  Rate:       53                           P:          57  WY:         139                          QRS:        64  QRSD:       83                           T:          64  QT:         467  QTc:        439    Interpretive Statements  Sinus rhythm, 53 bpm  Compared to ECG 2023 22:46:04  No significant changes  Electronically Signed On 2023 06:40:52 PDT by Lynn Cervantes     EKG   Result Value Ref Range    Report       [facility]    Test Date:  2023  Pt Name:    MARTY CHRISTIANSON             Department: ICU  MRN:        [id number]                      Room:       3321  Gender:     Male                         Technician: [id number]  :        1985                   Requested By:TIMOTHY CARTER  Order #:    [id number]                    Reading MD: Kiana Velez MD    Measurements  Intervals                                Axis  Rate:       50                           P:          35  WY:         134                           "QRS:        45  QRSD:       87                           T:          41  QT:         463  QTc:        423    Interpretive Statements  Incomplete analysis due to missing data in precordial lead(s)  Sinus bradycardia  Missing lead(s): V6  Compared to ECG 2023 03:23:15  Sinus rhythm no longer present  Electronically Signed On 2023 07:21:57 PDT by Kiana Velez MD     EKG (IP)   Result Value Ref Range    Report       [facility]    Test Date:  2023  Pt Name:    MARTY CHRISTIANSON             Department: [facility]  MRN:        [id number]                      Room:       [facility]  Gender:     Male                         Technician: [id number]  :        1985                   Requested By:ATIYA TURK  Order #:    [id number]                    Reading MD: Geovanni Young MD    Measurements  Intervals                                Axis  Rate:       86                           P:          53  WY:         157                          QRS:        59  QRSD:       86                           T:          40  QT:         361  QTc:        432    Interpretive Statements  Sinus rhythm  Compared to ECG 2023 11:22:33  Sinus bradycardia no longer present  Electronically Signed On 2023 23:31:19 PDT by Geovanni Young MD          MSE:   /65   Pulse 92   Temp 37 °C (98.6 °F) (Temporal)   Resp 18   Ht 1.803 m (5' 11\")   Wt 97.5 kg (214 lb 15.2 oz)   SpO2 97%     Constitutional: as noted above  General Appearance/Behavior: 38 y.o. appears muscular intermittent eye contact superficially cooperative, Poor impulse control  Abnormal Movements: none, no PMA/PMR or tremor observed.  Gait and Posture: not observed  Musculoskeletal: as noted above  Mood: \"fine\"  Affect: Restricted   Speech: normal rate, normal rhythm, normal tone, normal volume, and normal fluency  Language:  spontaneous, comprehends spoken commands, and fluent   Thought Process: Goal Directed and Perseverative, Future " Oriented  Thought Content: Denies SI/HI, A/VH. No e/o delusions, or internal preoccupation  Insight/Judgement:  poor to fair  Alert/Orientation: alert, oriented to person, place and time  Attn/Concentration: short attention span  MMSE: deferred this visit     Medications:  Scheduled Medications   Medication Dose Frequency    lithium carbonate ER  600 mg DAILY    senna-docusate  2 Tablet BID    rivaroxaban  10 mg DAILY AT 1800    thiamine  100 mg DAILY    vitamin B comp+C  1 Tablet DAILY    lithium carbonate ER  600 mg Nightly    nicotine  1 Patch Daily-0600       Allergies:   No Known Allergies     Assessment:   Diagnosis:   1. Intentional overdose, initial encounter (HCC) Acute   2. Psychosis, unspecified psychosis type (HCC) Acute   3. Polysubstance abuse (HCC) Acute   4. Suicidal ideation Acute   5. Non-compliance Acute   6. Methamphetamine abuse (HCC) Acute        Psychiatric:   Schizoaffective disorder, bipolar type, current hypomania  Stimulant use disorder - meth  Cannabis use      Medical: as noted by the medical treatment team.      Recommendations:  Legal Status: on legal hold - court commitment      Please transfer patient to inpatient psychiatric hospital when medically cleared and bed is available.     Observation status:   - Line of site with sitter     Phone: Yes - Okay to use at nursing availability/discretion.   Visitors: No   Personal belongings: Yes, books okay to use at nursing availability/discretion.      Discussed/voalted: MARYELLEN Mccormick DO     Medication Recommendations: Final orders as per Treatment Team  Continue Lithium 600mg BID  Risks/benefits/side effects discussed, patient verbalized understanding.  Medication reconciliation was completed.     Reviewed safety plan: 911, ER, PCM, MHC, suicide crisis line, nursing staff while inpatient.     Will Continue to Follow. Thank you for the consult.        Discharge recommendations:   Please provide referrals to outpatient psychiatric services in the  community.  Please provide referrals for substance programs in the community.

## 2023-10-04 NOTE — PROGRESS NOTES
Pt has lab draw due at 0300H and has another one at 0840H, informed phleb to cluster it as pt has history of being agitated once waken up.

## 2023-10-04 NOTE — PROGRESS NOTES
Received report from day shift nurse. Assumed pt care at 1915. Pt is A&Ox4, resting comfortably in bed. Pt on r.a.. No signs of SOB/respiratory distress. Labs noted, VSS. Pt c/o no pain at this moment. Fall precautions in place. With Security sitter. Plan of care on going, no further concerns as of present.     no

## 2023-10-04 NOTE — DISCHARGE PLANNING
Alert Team Note:    [place] [facility] [facility] [place]. On pending list.

## 2023-10-04 NOTE — PROGRESS NOTES
Received bedside report from night RN. Patient A&Ox4. Denies any complains at this time. Discussed POC and understood. Safety precaution in place. All needs met at this time. Security sitter at bedside.

## 2023-10-04 NOTE — PROGRESS NOTES
Hospital Medicine Daily Progress Note    Date of Service  10/4/2023    Chief Complaint  Peter Grace is a 38 y.o. male admitted 9/15/2023 with   Chief Complaint   Patient presents with    ALOC           Drug Overdose    Legal 2000     Patient BIB EMS with legal hold initiated by [employer]. Patient reports taking 15-20 pills of Seraquil. Patient combative in triage, arrived in four point restraints by EMS.      Hospital Course  No notes on file    Interval Problem Update  9/26:  Patient had no acute complaints.  - I reviewed psych notes, lithium was increased, will need to continue monitoring levels. Last lithium level was 0.7.  - legal hold was extended, pending inpatient psychiatric facility for discharge.  - I ordered ECG to recheck Qtc    9/27:  -I reviewed EKG, QTc appropriate for medications.  - I reviewed psychiatry notes, legal hold not discontinued.  We are pending court date.  We will continue current psychiatric medications.  - Continue sitter  -I reviewed labs, potassium 4.5, creatinine 1.15, magnesium 2.1.  Sodium 137.    9/28:  Patient did not want to take his medications today other than lithium.  We continue to encourage him to continue his medication as per psychiatry team recommendations, but he feels that it makes him too slow to speak but at this moment he is stuttering.  - Patient met with , legal hold was continued.  We are pending [facility] placement.  Complicated discharge as facility is working at half capacity as per case management.  -I spoke with psychiatry team, we will continue current medications with lithium, Haldol and Risperdal.  I changed Valium to PRN.    9/29:  Patient had no acute complaints but was asking for Valium.  - I spoke with psychiatry, we will discontinue Risperdal, changing Haldol to 10 mg at bedtime and continue lithium with lithium level checked tomorrow.  We are still pending for psychiatric facility bed availability but this may take time.    9/30:  Patient  stated he was eager to see if encouraged she will discontinue his legal hold if he continues to take his medications.  - I spoke with psychiatrist, we are discontinuing oral Haldol at bedtime.  We will continue lithium, pending lithium levels.  - We will continue with transfer to inpatient psych facility    10/01:  Patient had no acute complaints  - I reviewed lithium level, 0.4, low, dose adjusted    10/3: Lithium therapeutic, feeling anxious to DC.  Legal hold still in place for now.  Discussed with psych.  No IP psych beds available  10/4: Irritable and blunt during exam.  Angry he is not discharging today.  Mood not as stable as yesterday.     I have discussed this patient's plan of care and discharge plan at IDT rounds today with Case Management, Nursing, Nursing leadership, and other members of the IDT team.    Consultants/Specialty  critical care and psychiatry    Code Status  Full Code    Disposition  The patient is not medically cleared for discharge to home or a post-acute facility.  Anticipate discharge to: a psychiatric hospital    I have placed the appropriate orders for post-discharge needs.    Review of Systems  Review of Systems   Psychiatric/Behavioral:  Negative for suicidal ideas. The patient is nervous/anxious.    All other systems reviewed and are negative.       Physical Exam       Physical Exam  Vitals and nursing note reviewed.   Constitutional:       General: He is not in acute distress.  HENT:      Mouth/Throat:      Mouth: Mucous membranes are dry.      Pharynx: No oropharyngeal exudate.   Cardiovascular:      Rate and Rhythm: Normal rate and regular rhythm.      Pulses: Normal pulses.      Heart sounds: Normal heart sounds.   Pulmonary:      Effort: Pulmonary effort is normal. No respiratory distress.      Breath sounds: Normal breath sounds.   Abdominal:      General: Bowel sounds are normal. There is no distension.   Musculoskeletal:         General: No swelling. Normal range of motion.    Skin:     General: Skin is warm.      Coloration: Skin is not jaundiced or pale.   Neurological:      Mental Status: He is alert and oriented to person, place, and time. Mental status is at baseline.      Motor: No weakness.   Psychiatric:         Mood and Affect: Mood is anxious.         Speech: Speech is rapid and pressured.         Fluids    Intake/Output Summary (Last 24 hours) at 10/4/2023 1324  Last data filed at 10/4/2023 0900  Gross per 24 hour   Intake 1840 ml   Output --   Net 1840 ml         Laboratory  Recent Labs     10/04/23  0833   WBC 11.1*   RBC 4.58*   HEMOGLOBIN 14.7   HEMATOCRIT 44.1   MCV 96.3   MCH 32.1   MCHC 33.3   RDW 43.1   PLATELETCT 328   MPV 8.8*       Recent Labs     10/04/23  0833   SODIUM 140   POTASSIUM 3.8   CHLORIDE 105   CO2 22   GLUCOSE 119*   BUN 21   CREATININE 1.22   CALCIUM 9.0                     Imaging  No orders to display        Assessment/Plan  * Toxic encephalopathy- (present on admission)  Assessment & Plan  - Due to intentional drug overdose in an apparent suicide attempt.    Had to be temporarily placed on Precedex drip to control behavior. Downgraded on 9/24.    Bipolar affective (HCC)- (present on admission)  Assessment & Plan  I spoke with psychiatrist, medications adjusted.  -We will continue lithium 600 mg BID dose- level is therapeutic    - Continue Valium as needed for anxiety, and Geodon as needed for severe agitation  Haldol was discontinued  Continue legal hold for now until he is determined safe for discharge  -As per psychiatry note, patient is at goal with lithium levels, it is a potential patient may end up discharging as there are no beds available at [facility].  -Today he was agitated and irritable when told he could not discharge home - mood remains labile and I don't feel he is safe to DC home    Amphetamine abuse (HCC)- (present on admission)  Assessment & Plan  UDS positive    Rhabdomyolysis- (present on admission)  Assessment & Plan  Now  resolved  - may have been due to agitation, but seroquel overdose may cause  - 1729 on admission, last check was 171  - pt moving/ambulating without pain    Leukocytosis- (present on admission)  Assessment & Plan  Reactive, resolved    Intentional overdose (HCC)- (present on admission)  Assessment & Plan  - In an apparent suicide attempt.    - Maintain on suicide/homicide/elopement precaution.    - Continue one-on-one supervision.  - Patient met with  via Zoom, legal hold was continued.    -Psych is considering discontinuation of legal hold now that lithium is therapeutic.     [facility] vs [facility] placement but  Placement but beds are limited  Continue legal hold for now and continue to eval safe DC plan         VTE prophylaxis: Patient has been declining for DVT prophylaxis    I have performed a physical exam and reviewed and updated ROS and Plan today (10/4/2023). In review of yesterday's note (10/3/2023), there are no changes except as documented above.    Total time:  46 minutes.  I spent greater than 50% of the time for patient care, counseling, and coordination on this date, including unit/floor time, and face-to-face time with the patient as per interval events and assessment and plan above

## 2023-10-04 NOTE — CARE PLAN
The patient is Stable - Low risk of patient condition declining or worsening    Shift Goals  Clinical Goals: sleep at least 8 hours  Patient Goals: take his meds tonight  Family Goals: n/a    Progress made toward(s) clinical / shift goals:  Pt requested to have his Lithium earlier today, education provide. Pt also received Valium po as prn dose during this shift. Security sitter present. Hourly rounding and plan of care in progress.    Patient is not progressing towards the following goals:N/A

## 2023-10-05 LAB — LITHIUM SERPL-MCNC: 0.6 MMOL/L (ref 0.6–1.2)

## 2023-10-05 PROCEDURE — 770001 HCHG ROOM/CARE - MED/SURG/GYN PRIV*

## 2023-10-05 PROCEDURE — 99232 SBSQ HOSP IP/OBS MODERATE 35: CPT | Performed by: INTERNAL MEDICINE

## 2023-10-05 PROCEDURE — 700102 HCHG RX REV CODE 250 W/ 637 OVERRIDE(OP): Performed by: INTERNAL MEDICINE

## 2023-10-05 PROCEDURE — A9270 NON-COVERED ITEM OR SERVICE: HCPCS | Performed by: STUDENT IN AN ORGANIZED HEALTH CARE EDUCATION/TRAINING PROGRAM

## 2023-10-05 PROCEDURE — 700102 HCHG RX REV CODE 250 W/ 637 OVERRIDE(OP): Performed by: STUDENT IN AN ORGANIZED HEALTH CARE EDUCATION/TRAINING PROGRAM

## 2023-10-05 PROCEDURE — 36415 COLL VENOUS BLD VENIPUNCTURE: CPT

## 2023-10-05 PROCEDURE — A9270 NON-COVERED ITEM OR SERVICE: HCPCS | Performed by: INTERNAL MEDICINE

## 2023-10-05 PROCEDURE — 80178 ASSAY OF LITHIUM: CPT

## 2023-10-05 RX ADMIN — NICOTINE 14 MG: 14 PATCH TRANSDERMAL at 08:02

## 2023-10-05 RX ADMIN — LITHIUM CARBONATE 600 MG: 300 TABLET, EXTENDED RELEASE ORAL at 08:07

## 2023-10-05 RX ADMIN — LITHIUM CARBONATE 600 MG: 300 TABLET, EXTENDED RELEASE ORAL at 21:21

## 2023-10-05 RX ADMIN — Medication 1 TABLET: at 08:02

## 2023-10-05 RX ADMIN — DIAZEPAM 5 MG: 5 TABLET ORAL at 18:27

## 2023-10-05 RX ADMIN — SENNOSIDES AND DOCUSATE SODIUM 2 TABLET: 50; 8.6 TABLET ORAL at 08:02

## 2023-10-05 RX ADMIN — THIAMINE HCL TAB 100 MG 100 MG: 100 TAB at 08:02

## 2023-10-05 RX ADMIN — SENNOSIDES AND DOCUSATE SODIUM 2 TABLET: 50; 8.6 TABLET ORAL at 18:23

## 2023-10-05 ASSESSMENT — PAIN DESCRIPTION - PAIN TYPE: TYPE: ACUTE PAIN

## 2023-10-05 ASSESSMENT — ENCOUNTER SYMPTOMS: NERVOUS/ANXIOUS: 1

## 2023-10-05 NOTE — CARE PLAN
"The patient is Stable - Low risk of patient condition declining or worsening    Shift Goals  Clinical Goals: safety, compliant with taking medication, no agitation  Patient Goals: \"go home tomorrow\"  Family Goals: n/a    Progress made toward(s) clinical / shift goals:  Pt calm and cooperative over night, compliant with taking all medications, no agitation noted      Problem: Knowledge Deficit - Standard  Goal: Patient and family/care givers will demonstrate understanding of plan of care, disease process/condition, diagnostic tests and medications  Outcome: Progressing     Problem: Skin Integrity  Goal: Skin integrity is maintained or improved  Outcome: Progressing     Problem: Fall Risk  Goal: Patient will remain free from falls  Outcome: Progressing     Problem: Pain - Standard  Goal: Alleviation of pain or a reduction in pain to the patient’s comfort goal  Outcome: Progressing     Problem: Psychosocial  Goal: Patient's level of anxiety will decrease  Outcome: Progressing     Problem: Psychosocial  Goal: Patient and family will demonstrate ability to cope with life altering diagnosis and/or procedure  Outcome: Progressing     "

## 2023-10-05 NOTE — CARE PLAN
Problem: Safety - Medical Restraint  Goal: Remains free of injury from restraints (Restraint for Interference with Medical Device)  Outcome: Progressing     Problem: Psychosocial  Goal: Patient's level of anxiety will decrease  Outcome: Progressing     The patient is Stable - Low risk of patient condition declining or worsening    Shift Goals  Clinical Goals: Continue timely administration of medications; ensure pt remains free from agitation; continue safety interventions  Patient Goals: Discharge as soon as possible  Family Goals: n/a    Progress made toward(s) clinical / shift goals:  Pt reports alleviation of anxiety with administration of prn valium. Pt still remains restless and pacing throughout shift. Pt especially anxious re: discharge plan.    Patient is not progressing towards the following goals:      Problem: Psychosocial  Goal: Patient and family will demonstrate ability to cope with life altering diagnosis and/or procedure  Outcome: Not Progressing

## 2023-10-05 NOTE — CONSULTS
[facility]  PSYCHIATRIC CONSULTATION - Follow-up  Established Patient    DOS: 10/05/23     Reason for Admission:   reported taking 15-20 pills of seroquel. Was combative. He had a prescription bottle with him that was filled 3 days ago that was empty per report.  Police were called as the patient was reported as trespassing and when he was in the police car patient started screaming that he was suicidal and that he overdosed on Seroquel and EMS was called and the patient requested to go to a different facility other than [facility]. The most he could have taken was around 1,250 mg.    Legal Hold Status: court commitment    CC:   Chief Complaint   Patient presents with    ALOC           Drug Overdose    Legal 2000     Patient BIB EMS with legal hold initiated by [employer]. Patient reports taking 15-20 pills of Seraquil. Patient combative in triage, arrived in four point restraints by EMS.                S:   Patient observed in bed, easy to wake. Expressing a desire to speak with psychiatry to explain safe d/c plan, continue to vocalize feeling better and ready to d/c, with plan to secure shelter, clothing, placement for [facility] when appropriate. Reports good sleep, energy, appetite, mood, denies SI/HI, A/VH, no Sx of psychosis/ceci reported or observed. Patient calm throughout, somewhat restricted affect, short answers without elaboration despite prompting. Medication adherent. Denies GI distress, HA, dizziness, appears to be tolerating medications.    O:   Medical ROS (as pertinent):   Recent Labs     10/04/23  0833   WBC 11.1*   RBC 4.58*   HEMOGLOBIN 14.7   HEMATOCRIT 44.1   MCV 96.3   MCH 32.1   RDW 43.1   PLATELETCT 328   MPV 8.8*   NEUTSPOLYS 66.30   LYMPHOCYTES 25.00   MONOCYTES 4.10   EOSINOPHILS 3.60   BASOPHILS 0.50     Recent Labs     10/04/23  0833   SODIUM 140   POTASSIUM 3.8   CHLORIDE 105   CO2 22   GLUCOSE 119*   BUN 21     Recent Labs     10/04/23  0833   ALBUMIN 3.9   CREATININE  1.22       EKG:   Results for orders placed or performed during the hospital encounter of 09/15/23   EKG (NOW)   Result Value Ref Range    Report       [facility] Emergency Dept.    Test Date:  2023-09-15  Pt Name:    MARTY CHRISTIANSON             Department: [facility]  MRN:        [id number]                      Room:       [facility] 6  Gender:     Male                         Technician: [id number]  :        1985                   Requested By:FELICITY RUIZ  Order #:    [id number]                    Reading MD: Felicity Ruiz    Measurements  Intervals                                Axis  Rate:       104                          P:          55  FL:         138                          QRS:        57  QRSD:       87                           T:          41  QT:         338  QTc:        445    Interpretive Statements  Sinus tachycardia  Borderline low voltage, extremity leads  Baseline wander in lead(s) I,II,aVR,V1,V2,V3,V6  No previous ECG available for comparison  Electronically Signed On 2023 00:00:19 PDT by Felicity Ruiz     EKG (NOW)   Result Value Ref Range    Report       [facility] Emergency Dept.    Test Date:  2023  Pt Name:    MARTY CHRISTIANSON             Department: [facility]  MRN:        [id number]                      Room:       [facility] 6  Gender:     Male                         Technician: [id number]  :        1985                   Requested By:FELICITY RUIZ  Order #:    [id number]                    Reading MD: Felicity Ruiz    Measurements  Intervals                                Axis  Rate:       94                           P:          66  FL:         141                          QRS:        56  QRSD:       79                           T:          66  QT:         361  QTc:        452    Interpretive Statements  Sinus rhythm  Probable left atrial enlargement  Baseline wander in lead(s) V2  Compared to ECG 09/15/2023  23:52:48  Sinus tachycardia no longer present  Electronically Signed On 2023 04:35:24 PDT by Geovanni Briseno     EKG   Result Value Ref Range    Report       [facility]    Test Date:  2023  Pt Name:    MARTY CHRISTIANSON             Department: [facility]  MRN:        [id number]                      Room:       3321  Gender:     Male                         Technician: [id number]  :        1985                   Requested By:TIMOTHY CARTER  Order #:    [id number]                    Reading MD: Lynn Cervantes    Measurements  Intervals                                Axis  Rate:       49                           P:          47  MI:         142                          QRS:        45  QRSD:       90                           T:          68  QT:         483  QTc:        437    Interpretive Statements  Sinus bradycardia, 49 bpm  Compared to ECG 2023 04:20:02  Rate is slower and does not meet criteria for left atrial enlargement  Electronically Signed On 2023 06:19:08 PDT by Lynn Cervantes     EKG   Result Value Ref Range    Report       [facility]    Test Date:  2023  Pt Name:    MARTY CHRISTIANSON             Department: [facility]  MRN:        [id number]                      Room:       3321  Gender:     Male                         Technician: [id number]  :        1985                   Requested By:TIMOTHY CARTER  Order #:    [id number]                    Reading MD: Lynn Cervantes    Measurements  Intervals                                Axis  Rate:       50                           P:          49  MI:         143                          QRS:        68  QRSD:       90                           T:          72  QT:         492  QTc:        449    Interpretive Statements  Sinus bradycardia, 50 bpm  Compared to ECG 2023 18:33:05  No significant changes  Electronically Signed On 2023 06:33:22 PDT by Lynn Cervantes     EKG   Result Value Ref Range    Report       [facility]    Test Date:  2023  Pt Name:    MARTY CHRISTIANSON             Department: ICU  MRN:        [id number]                      Room:       3321  Gender:     Male                         Technician: [id number]  :        1985                   Requested By:TIMOTHY CARTER  Order #:    [id number]                    Reading MD: Lynn Cervantes    Measurements  Intervals                                Axis  Rate:       53                           P:          57  OK:         139                          QRS:        64  QRSD:       83                           T:          64  QT:         467  QTc:        439    Interpretive Statements  Sinus rhythm, 53 bpm  Compared to ECG 2023 22:46:04  No significant changes  Electronically Signed On 2023 06:40:52 PDT by Lynn Cervantes     EKG   Result Value Ref Range    Report       [facility]    Test Date:  2023  Pt Name:    MARTY CHRISTIANSON             Department: ICU  MRN:        [id number]                      Room:       [facility]  Gender:     Male                         Technician: [id number]  :        1985                   Requested By:TIMOTHY CARTER  Order #:    [id number]                    Reading MD: Kiana Velez MD    Measurements  Intervals                                Axis  Rate:       50                           P:          35  OK:         134                          QRS:        45  QRSD:       87                           T:          41  QT:         463  QTc:        423    Interpretive Statements  Incomplete analysis due to missing data in precordial lead(s)  Sinus bradycardia  Missing lead(s): V6  Compared to ECG 2023 03:23:15  Sinus rhythm no longer present  Electronically Signed On 2023 07:21:57 PDT by Kiana Velez MD     EKG (IP)   Result Value Ref Range    Report       [facility]    Test Date:  2023  Pt Name:    MARTY CHRISTIANSON             Department: U  MRN:        [id number]                      " Room:       2212  Gender:     Male                         Technician: [id number]  :        1985                   Requested By:ATIYA  ROSALEE  Order #:    [id number]                    Reading MD: Geovanni Young MD    Measurements  Intervals                                Axis  Rate:       86                           P:          53  WY:         157                          QRS:        59  QRSD:       86                           T:          40  QT:         361  QTc:        432    Interpretive Statements  Sinus rhythm  Compared to ECG 2023 11:22:33  Sinus bradycardia no longer present  Electronically Signed On 2023 23:31:19 PDT by Geovanni Young MD          MSE:   BP (!) 135/96   Pulse 74   Temp 37.1 °C (98.7 °F) (Temporal)   Resp 20   Ht 1.803 m (5' 11\")   Wt 97.5 kg (214 lb 15.2 oz)   SpO2 96%     Constitutional: as noted above  General Appearance/Behavior: 38 y.o. appears muscular intermittent eye contact indifferent, Poor impulse control  Abnormal Movements: none, no PMA/PMR or tremor observed.  Gait and Posture: not observed  Musculoskeletal: as noted above  Mood: \"good\"  Affect: Restricted   Speech: normal rate, normal rhythm, normal tone, normal volume, and normal fluency  Language:  spontaneous, comprehends spoken commands, and fluent   Thought Process: Goal Directed and Perseverative, Future Oriented  Thought Content: Denies SI/HI, A/VH. No e/o delusions, or internal preoccupation  Insight/Judgement:  fair  Alert/Orientation: alert, oriented to person, place and time  Attn/Concentration: short attention span  MMSE: deferred this visit     Medications:  Scheduled Medications   Medication Dose Frequency    lithium carbonate ER  600 mg DAILY    senna-docusate  2 Tablet BID    rivaroxaban  10 mg DAILY AT 1800    thiamine  100 mg DAILY    vitamin B comp+C  1 Tablet DAILY    lithium carbonate ER  600 mg Nightly    nicotine  1 Patch Daily-0600       Allergies:   No Known Allergies "     Assessment:   Diagnosis:   1. Intentional overdose, initial encounter (HCC) Acute   2. Psychosis, unspecified psychosis type (HCC) Acute   3. Polysubstance abuse (HCC) Acute   4. Suicidal ideation Acute   5. Non-compliance Acute   6. Methamphetamine abuse (HCC) Acute        Psychiatric:   Schizoaffective disorder, bipolar type, current hypomania  Stimulant use disorder - meth  Cannabis use      Medical: as noted by the medical treatment team.      Recommendations:  Legal Status: on legal hold - court commitment      Please transfer patient to inpatient psychiatric hospital when medically cleared and bed is available.     Observation status:   - Line of site with sitter     Phone: Yes - Okay to use at nursing availability/discretion.   Visitors: No   Personal belongings: Yes, books okay to use at nursing availability/discretion.      Medication Recommendations: Final orders as per Treatment Team  Continue Lithium 600mg BID  Risks/benefits/side effects discussed, patient verbalized understanding.  Medication reconciliation was completed     Reviewed safety plan: 911, ER, PCM, MHC, suicide crisis line, nursing staff while inpatient.     Will Continue to Follow. Thank you for the consult.        Discharge recommendations:   Please provide referrals to outpatient psychiatric services in the community.  Please provide referrals for substance programs in the community.

## 2023-10-05 NOTE — PROGRESS NOTES
Hospital Medicine Daily Progress Note    Date of Service  10/5/2023    Chief Complaint  Peter Grace is a 38 y.o. male admitted 9/15/2023 with   Chief Complaint   Patient presents with    ALOC           Drug Overdose    Legal 2000     Patient BIB EMS with legal hold initiated by [employer]. Patient reports taking 15-20 pills of Seraquil. Patient combative in triage, arrived in four point restraints by EMS.      Hospital Course  No notes on file    Interval Problem Update  9/26:  Patient had no acute complaints.  - I reviewed psych notes, lithium was increased, will need to continue monitoring levels. Last lithium level was 0.7.  - legal hold was extended, pending inpatient psychiatric facility for discharge.  - I ordered ECG to recheck Qtc    9/27:  -I reviewed EKG, QTc appropriate for medications.  - I reviewed psychiatry notes, legal hold not discontinued.  We are pending court date.  We will continue current psychiatric medications.  - Continue sitter  -I reviewed labs, potassium 4.5, creatinine 1.15, magnesium 2.1.  Sodium 137.    9/28:  Patient did not want to take his medications today other than lithium.  We continue to encourage him to continue his medication as per psychiatry team recommendations, but he feels that it makes him too slow to speak but at this moment he is stuttering.  - Patient met with , legal hold was continued.  We are pending [facility] placement.  Complicated discharge as facility is working at half capacity as per case management.  -I spoke with psychiatry team, we will continue current medications with lithium, Haldol and Risperdal.  I changed Valium to PRN.    9/29:  Patient had no acute complaints but was asking for Valium.  - I spoke with psychiatry, we will discontinue Risperdal, changing Haldol to 10 mg at bedtime and continue lithium with lithium level checked tomorrow.  We are still pending for psychiatric facility bed availability but this may take time.    9/30:  Patient  stated he was eager to see if encouraged she will discontinue his legal hold if he continues to take his medications.  - I spoke with psychiatrist, we are discontinuing oral Haldol at bedtime.  We will continue lithium, pending lithium levels.  - We will continue with transfer to inpatient psych facility    10/01:  Patient had no acute complaints  - I reviewed lithium level, 0.4, low, dose adjusted    10/3: Lithium therapeutic, feeling anxious to DC.  Legal hold still in place for now.  Discussed with psych.  No IP psych beds available  10/4: Irritable and blunt during exam.  Angry he is not discharging today.  Mood not as stable as yesterday.   10/5: Remains blunted and irritable, perseverates on discharge.      I have discussed this patient's plan of care and discharge plan at IDT rounds today with Case Management, Nursing, Nursing leadership, and other members of the IDT team.    Consultants/Specialty  critical care and psychiatry    Code Status  Full Code    Disposition  The patient is medically cleared for discharge to home or a post-acute facility.  Anticipate discharge to: a psychiatric hospital    I have placed the appropriate orders for post-discharge needs.    Review of Systems  Review of Systems   Psychiatric/Behavioral:  Negative for suicidal ideas. The patient is nervous/anxious.    All other systems reviewed and are negative.       Physical Exam  Temp:  [37.1 °C (98.7 °F)] 37.1 °C (98.7 °F)  Pulse:  [74-93] 74  Resp:  [18-20] 20  BP: (114-135)/(78-96) 135/96  SpO2:  [94 %-96 %] 96 %    Physical Exam  Vitals and nursing note reviewed.   Constitutional:       General: He is not in acute distress.  HENT:      Mouth/Throat:      Mouth: Mucous membranes are dry.      Pharynx: No oropharyngeal exudate.   Cardiovascular:      Rate and Rhythm: Normal rate and regular rhythm.      Pulses: Normal pulses.      Heart sounds: Normal heart sounds.   Pulmonary:      Effort: Pulmonary effort is normal. No respiratory  distress.      Breath sounds: Normal breath sounds.   Abdominal:      General: Bowel sounds are normal. There is no distension.   Musculoskeletal:         General: No swelling. Normal range of motion.   Skin:     General: Skin is warm.      Coloration: Skin is not jaundiced or pale.   Neurological:      Mental Status: He is alert and oriented to person, place, and time. Mental status is at baseline.      Motor: No weakness.   Psychiatric:         Mood and Affect: Mood is anxious.         Speech: Speech is rapid and pressured.         Fluids    Intake/Output Summary (Last 24 hours) at 10/5/2023 1219  Last data filed at 10/5/2023 0930  Gross per 24 hour   Intake 1020 ml   Output --   Net 1020 ml         Laboratory  Recent Labs     10/04/23  0833   WBC 11.1*   RBC 4.58*   HEMOGLOBIN 14.7   HEMATOCRIT 44.1   MCV 96.3   MCH 32.1   MCHC 33.3   RDW 43.1   PLATELETCT 328   MPV 8.8*       Recent Labs     10/04/23  0833   SODIUM 140   POTASSIUM 3.8   CHLORIDE 105   CO2 22   GLUCOSE 119*   BUN 21   CREATININE 1.22   CALCIUM 9.0                     Imaging  No orders to display        Assessment/Plan  * Toxic encephalopathy- (present on admission)  Assessment & Plan  - Due to intentional drug overdose in an apparent suicide attempt.    Had to be temporarily placed on Precedex drip to control behavior. Downgraded on 9/24.    Bipolar affective (HCC)- (present on admission)  Assessment & Plan  I spoke with psychiatrist, medications adjusted.  -We will continue lithium 600 mg BID dose- level is therapeutic    - Continue Valium as needed for anxiety, and Geodon as needed for severe agitation  Haldol was discontinued  Continue legal hold for now until he is determined safe for discharge  -As per psychiatry note, patient is at goal with lithium levels, it is a potential patient may end up discharging as there are no beds available at [facility].  -Today he was agitated and irritable when told he could not discharge home - mood remains  labile and I don't feel he is safe to DC home    Amphetamine abuse (HCC)- (present on admission)  Assessment & Plan  UDS positive    Rhabdomyolysis- (present on admission)  Assessment & Plan  Now resolved  - may have been due to agitation, but seroquel overdose may cause  - 1729 on admission, last check was 171  - pt moving/ambulating without pain    Leukocytosis- (present on admission)  Assessment & Plan  Reactive, resolved    Intentional overdose (HCC)- (present on admission)  Assessment & Plan  - In an apparent suicide attempt.    - Maintain on suicide/homicide/elopement precaution.    - Continue one-on-one supervision.  - Patient met with  via Zoom, legal hold was continued.    -Psych is considering discontinuation of legal hold now that lithium is therapeutic.     [facility] vs [facility] placement but  Placement but beds are limited  Continue legal hold for now and continue to eval safe DC plan         VTE prophylaxis: Patient has been declining for DVT prophylaxis    I have performed a physical exam and reviewed and updated ROS and Plan today (10/5/2023). In review of yesterday's note (10/4/2023), there are no changes except as documented above.

## 2023-10-05 NOTE — PROGRESS NOTES
Received report from day shift RN. Pt A&Ox4. Resting on couch in room with security sitter outside of room. Reviewed plan of care for the night.

## 2023-10-05 NOTE — PROGRESS NOTES
Bedside report received from night RN. Assumed care of patient. Daily plan of care discussed. Pt resting comfortably on couch at bedside, breathing even and unlabored, chest and fall noted. Pt requesting to sleep this AM, will offer 0600 medications when pt awake. 12 hour chart check complete. Hourly rounding in place.

## 2023-10-06 VITALS
HEART RATE: 83 BPM | OXYGEN SATURATION: 96 % | BODY MASS INDEX: 30.09 KG/M2 | RESPIRATION RATE: 18 BRPM | TEMPERATURE: 97.9 F | SYSTOLIC BLOOD PRESSURE: 118 MMHG | HEIGHT: 71 IN | DIASTOLIC BLOOD PRESSURE: 78 MMHG | WEIGHT: 214.95 LBS

## 2023-10-06 LAB
FLUAV RNA SPEC QL NAA+PROBE: NEGATIVE
FLUBV RNA SPEC QL NAA+PROBE: NEGATIVE
LITHIUM SERPL-MCNC: 0.9 MMOL/L (ref 0.6–1.2)
RSV RNA SPEC QL NAA+PROBE: NEGATIVE
SARS-COV-2 RNA RESP QL NAA+PROBE: NOTDETECTED
SPECIMEN SOURCE: NORMAL

## 2023-10-06 PROCEDURE — 80178 ASSAY OF LITHIUM: CPT

## 2023-10-06 PROCEDURE — 700102 HCHG RX REV CODE 250 W/ 637 OVERRIDE(OP): Performed by: STUDENT IN AN ORGANIZED HEALTH CARE EDUCATION/TRAINING PROGRAM

## 2023-10-06 PROCEDURE — A9270 NON-COVERED ITEM OR SERVICE: HCPCS | Performed by: INTERNAL MEDICINE

## 2023-10-06 PROCEDURE — 700102 HCHG RX REV CODE 250 W/ 637 OVERRIDE(OP): Performed by: INTERNAL MEDICINE

## 2023-10-06 PROCEDURE — 0241U HCHG SARS-COV-2 COVID-19 NFCT DS RESP RNA 4 TRGT MIC: CPT

## 2023-10-06 PROCEDURE — 99239 HOSP IP/OBS DSCHRG MGMT >30: CPT | Performed by: INTERNAL MEDICINE

## 2023-10-06 PROCEDURE — 36415 COLL VENOUS BLD VENIPUNCTURE: CPT

## 2023-10-06 PROCEDURE — A9270 NON-COVERED ITEM OR SERVICE: HCPCS | Performed by: STUDENT IN AN ORGANIZED HEALTH CARE EDUCATION/TRAINING PROGRAM

## 2023-10-06 RX ORDER — ZIPRASIDONE MESYLATE 20 MG/ML
20 INJECTION, POWDER, LYOPHILIZED, FOR SOLUTION INTRAMUSCULAR EVERY 12 HOURS PRN
Qty: 3 EACH | Status: SHIPPED
Start: 2023-10-06

## 2023-10-06 RX ORDER — LITHIUM CARBONATE 300 MG/1
600 TABLET, FILM COATED, EXTENDED RELEASE ORAL 2 TIMES DAILY
Status: SHIPPED
Start: 2023-10-06

## 2023-10-06 RX ADMIN — SENNOSIDES AND DOCUSATE SODIUM 2 TABLET: 50; 8.6 TABLET ORAL at 06:47

## 2023-10-06 RX ADMIN — LITHIUM CARBONATE 600 MG: 300 TABLET, EXTENDED RELEASE ORAL at 09:23

## 2023-10-06 RX ADMIN — Medication 1 TABLET: at 06:46

## 2023-10-06 RX ADMIN — THIAMINE HCL TAB 100 MG 100 MG: 100 TAB at 06:47

## 2023-10-06 RX ADMIN — NICOTINE 14 MG: 14 PATCH TRANSDERMAL at 09:23

## 2023-10-06 RX ADMIN — SENNOSIDES AND DOCUSATE SODIUM 2 TABLET: 50; 8.6 TABLET ORAL at 16:40

## 2023-10-06 RX ADMIN — RIVAROXABAN 10 MG: 10 TABLET, FILM COATED ORAL at 16:40

## 2023-10-06 RX ADMIN — DIAZEPAM 5 MG: 5 TABLET ORAL at 16:40

## 2023-10-06 RX ADMIN — NICOTINE POLACRILEX 2 MG: 2 GUM, CHEWING BUCCAL at 16:40

## 2023-10-06 ASSESSMENT — PAIN DESCRIPTION - PAIN TYPE: TYPE: ACUTE PAIN

## 2023-10-06 NOTE — DISCHARGE PLANNING
Alert Team/Behavioral Health   Note:      - [facility]: Talked to RN Supervisor (Greg). No admissions today/no beds currently available. Referral is on pending list.

## 2023-10-06 NOTE — PROGRESS NOTES
Received bedside report from night, RN. Patient alert and oriented x4. Sleeping in couch; easy to awaken. Denies any complains at this time. No needs unmet at this tie. Safety precautions in place. Security sitter at bedside.

## 2023-10-06 NOTE — DISCHARGE PLANNING
DC Transport Scheduled    Received request at: 10/6/2023 at 1534    Transport Company Scheduled:  [facility]  Spoke with Almaz at [facility] to schedule transport.  [facility] Trip #: [id number]    Scheduled Date: 10/6/2023  Scheduled Time: 1800    Destination: [facility] at [address] [place] [address] [address] [facility]     Notified care team of scheduled transport via Voalte.     If there are any changes needed to the DC transportation scheduled, please contact [facility] at ext. [phone] between the hours of 4902-3805 Mon-Fri. If outside those hours, contact the ED Case Manager at ext. [phone].

## 2023-10-06 NOTE — CARE PLAN
The patient is Stable - Low risk of patient condition declining or worsening    Shift Goals  Clinical Goals: Administer medications as ordered; continue use of safety precautions  Patient Goals: Rest.  Family Goals: n/a    Progress made toward(s) clinical / shift goals:  Medications administered as ordered; security sitter outside door, no utensils, strings, excess linens in patient room.    Patient is not progressing towards the following goals:

## 2023-10-06 NOTE — PROGRESS NOTES
Received a call from Ileana from [facility], per Ileana there might be a bed available for patient later this afternoon. Requesting Covid testing. MD informed.

## 2023-10-06 NOTE — PROGRESS NOTES
Received report from day shift nurse. Assumed pt care at 1915. Pt is A&Ox4, resting comfortably. Pt on RA. No signs of SOB/respiratory distress. Labs, VS, medications reviewed.  Fall precautions in place. Bed at lowest position. Call light and personal belongings within reach. Plan of care discussed, no further concerns at this time.  Patient remains legal hold. 1:1 security sitter. Safety precautions in place.

## 2023-10-06 NOTE — PROGRESS NOTES
Received call from Fab[facility], per Fabiana they need medical clearance in the original legal hold paperwork filled out by our physician to be able to accept the patient. MD updated.

## 2023-10-06 NOTE — DISCHARGE SUMMARY
Discharge Summary    CHIEF COMPLAINT ON ADMISSION  Chief Complaint   Patient presents with    ALOC           Drug Overdose    Legal 2000     Patient BIB EMS with legal hold initiated by [employer]. Patient reports taking 15-20 pills of Seraquil. Patient combative in triage, arrived in four point restraints by EMS.        Reason for Admission  EMS     Admission Date  9/15/2023    CODE STATUS  Full Code    HPI & HOSPITAL COURSE  This is a 38 y.o. male here with a history of substance abuse, depression and psychiatric illness who was admitted for severe agitation complaining of an intentional Seroquel overdose.  He was admitted to the CDU and intubated and placed on a Precedex drip.  He was placed on a legal hold.  Psychiatry was consulted.  Showed evidence of mild rhabdomyolysis the and was aggressively hydrated.  He had a reactive leukocytosis but has no evidence of confirmed infection.  He was extubated and transferred to the medical floor.  Upon meeting with psychiatry they discussed admission to inpatient psych however his discharge was delayed significantly due to lack of bed availability.  His psychiatric medications were adjusted.  He was started on lithium and the dose was uptitrated until his    Level was therapeutic.  He will be discharged on lithium 600 twice daily and ziprazodone.  PRN benzos were given during his hospital stay and can be used at the digression of IP psych facility.      Therefore, he is discharged in guarded and stable condition to a psychiatric hospital.    The patient met 2-midnight criteria for an inpatient stay at the time of discharge.    Discharge Date  10/6/23    FOLLOW UP ITEMS POST DISCHARGE  Continue IP care at psych facility    DISCHARGE DIAGNOSES  Principal Problem:    Toxic encephalopathy (POA: Yes)  Active Problems:    Intentional overdose (HCC) (POA: Yes)    Leukocytosis (POA: Yes)    Rhabdomyolysis (POA: Yes)    Amphetamine abuse (HCC) (Chronic) (POA: Yes)    Bipolar affective  ([facility]) (POA: Yes)  Resolved Problems:    * No resolved hospital problems. *      FOLLOW UP  F/U with IP psych    MEDICATIONS ON DISCHARGE     Medication List        START taking these medications        Instructions   lithium carbonate  MG Tbcr tablet   Take 2 Tablets by mouth 2 times a day.  Dose: 600 mg     ziprasidone 20 MG Solr  Commonly known as: Geodon   Inject 20 mg into the shoulder, thigh, or buttocks every 12 hours as needed (Severe agitation if Haldol is ineffective after 10 minutes).  Dose: 20 mg            STOP taking these medications      SEROQUEL PO              Allergies  No Known Allergies    DIET  Orders Placed This Encounter   Procedures    Diet Order Diet: Regular; Tray Modifications (optional): No Sharps (Paperware), Double Portions     Standing Status:   Standing     Number of Occurrences:   1     Order Specific Question:   Diet:     Answer:   Regular [1]     Order Specific Question:   Tray Modifications (optional)     Answer:   No Sharps (Paperware)     Order Specific Question:   Tray Modifications (optional)     Answer:   Double Portions       ACTIVITY  As tolerated.  Weight bearing as tolerated    CONSULTATIONS  Psych    PROCEDURES  Intubation    LABORATORY  Lab Results   Component Value Date    SODIUM 140 10/04/2023    POTASSIUM 3.8 10/04/2023    CHLORIDE 105 10/04/2023    CO2 22 10/04/2023    GLUCOSE 119 (H) 10/04/2023    BUN 21 10/04/2023    CREATININE 1.22 10/04/2023        Lab Results   Component Value Date    WBC 11.1 (H) 10/04/2023    HEMOGLOBIN 14.7 10/04/2023    HEMATOCRIT 44.1 10/04/2023    PLATELETCT 328 10/04/2023        Total time of the discharge process exceeds 43 minutes.

## 2023-10-06 NOTE — DISCHARGE PLANNING
CMSW services brief notes:    Received message from Bedside RN , that patient is accepted at [facility].    [facility] placed call to Coral (p) [phone] and she confirms there is a bed today for this patient.     Transport submitted to Jennifer requesting for 1700  time.

## 2023-10-06 NOTE — PROGRESS NOTES
Received call from Fabiana from [facility], they will accept patient and wants transport set up for 1800.    MD informed. CM informed.

## 2023-10-07 NOTE — CONSULTS
[facility]  PSYCHIATRIC CONSULTATION - Follow-up  Established Patient    DOS: 10/06/23     Reason for Admission:   reported taking 15-20 pills of seroquel. Was combative. He had a prescription bottle with him that was filled 3 days ago that was empty per report.  Police were called as the patient was reported as trespassing and when he was in the police car patient started screaming that he was suicidal and that he overdosed on Seroquel and EMS was called and the patient requested to go to a different facility other than [facility]. The most he could have taken was around 1,250 mg.    Legal Hold Status: court commitment    CC:   Chief Complaint   Patient presents with    ALOC           Drug Overdose    Legal 2000     Patient BIB EMS with legal hold initiated by [employer]. Patient reports taking 15-20 pills of Seraquil. Patient combative in triage, arrived in four point restraints by EMS.                S:   Observed in room, awake. States he is eager to transfer to  so he can work on getting stable and additional supportive services. Reports improvement to sleep, energy, appetite, mood since medication adherence with Lithium. Appears calm, able to stay seated throughout entire encounter, able to vocalize situation leading to hospitalization. Denies SI/HI, A/VH, no Sx of psychosis/ceci reported or observed. Denies GI distress, HA, dizziness, appears to be tolerating medications. Future focused, vocalizing looking forward to treatment, vocalizes responsibility for substance use causing/intensifying situation leading to hospitalization, continues to express a desire to enter rehab program to maintain sobriety.     O:   Medical ROS (as pertinent):   Recent Labs     10/04/23  0833   WBC 11.1*   RBC 4.58*   HEMOGLOBIN 14.7   HEMATOCRIT 44.1   MCV 96.3   MCH 32.1   RDW 43.1   PLATELETCT 328   MPV 8.8*   NEUTSPOLYS 66.30   LYMPHOCYTES 25.00   MONOCYTES 4.10   EOSINOPHILS 3.60   BASOPHILS 0.50      Recent Labs     10/04/23  0833   SODIUM 140   POTASSIUM 3.8   CHLORIDE 105   CO2 22   GLUCOSE 119*   BUN 21     Recent Labs     10/04/23  0833   ALBUMIN 3.9   CREATININE 1.22       EKG:   Results for orders placed or performed during the hospital encounter of 09/15/23   EKG (NOW)   Result Value Ref Range    Report       [facility]    Test Date:  2023-09-15  Pt Name:    MARTY CHRISTIANSON             Department: EDS  MRN:        [id number]                      Room:       [facility]  Gender:     Male                         Technician: [id number]  :        1985                   Requested By:FELICITY RUIZ  Order #:    [id number]                    Reading MD: Felicity Ruiz    Measurements  Intervals                                Axis  Rate:       104                          P:          55  WA:         138                          QRS:        57  QRSD:       87                           T:          41  QT:         338  QTc:        445    Interpretive Statements  Sinus tachycardia  Borderline low voltage, extremity leads  Baseline wander in lead(s) I,II,aVR,V1,V2,V3,V6  No previous ECG available for comparison  Electronically Signed On 2023 00:00:19 PDT by Felicity Ruiz     EKG (NOW)   Result Value Ref Range    Report       [facility]    Test Date:  2023  Pt Name:    MARTY CHRISTIANSON             Department: EDS  MRN:        [id number]                      Room:       [facility]  Gender:     Male                         Technician: [id number]  :        1985                   Requested By:FELICITY RUIZ  Order #:    [id number]                    Reading MD: Felicity Ruiz    Measurements  Intervals                                Axis  Rate:       94                           P:          66  WA:         141                          QRS:        56  QRSD:       79                           T:           66  QT:         361  QTc:        452    Interpretive Statements  Sinus rhythm  Probable left atrial enlargement  Baseline wander in lead(s) V2  Compared to ECG 09/15/2023 23:52:48  Sinus tachycardia no longer present  Electronically Signed On 2023 04:35:24 PDT by Geovanni Briseno     EKG   Result Value Ref Range    Report       [facility]    Test Date:  2023  Pt Name:    MARTY CHRISTIANSON             Department: EDS  MRN:        [id number]                      Room:       [place]  Gender:     Male                         Technician: [id number]  :        1985                   Requested By:TIMOTHY CARTER  Order #:    [id number]                    Reading MD: Lynn Cervantes    Measurements  Intervals                                Axis  Rate:       49                           P:          47  AZ:         142                          QRS:        45  QRSD:       90                           T:          68  QT:         483  QTc:        437    Interpretive Statements  Sinus bradycardia, 49 bpm  Compared to ECG 2023 04:20:02  Rate is slower and does not meet criteria for left atrial enlargement  Electronically Signed On 2023 06:19:08 PDT by Lynn Cervantes     EKG   Result Value Ref Range    Report       [facility]    Test Date:  2023  Pt Name:    MARTY CHRISTIANSON             Department: EDS  MRN:        [id number]                      Room:       332  Gender:     Male                         Technician: [id number]  :        1985                   Requested By:TIMOTHY CARTER  Order #:    [id number]                    Reading MD: Lynn Cervantes    Measurements  Intervals                                Axis  Rate:       50                           P:          49  AZ:         143                          QRS:        68  QRSD:       90                           T:          72  QT:         492  QTc:        449    Interpretive Statements  Sinus bradycardia, 50 bpm  Compared to ECG 2023  18:33:05  No significant changes  Electronically Signed On 2023 06:33:22 PDT by Lynn Cervantes     EKG   Result Value Ref Range    Report       [facility]    Test Date:  2023  Pt Name:    MARTY CHRISTIANSON             Department: ICU  MRN:        [id number]                      Room:       3321  Gender:     Male                         Technician: [id number]  :        1985                   Requested By:TIMOTHY CARTER  Order #:    [id number]                    Reading MD: Lynn Cervantes    Measurements  Intervals                                Axis  Rate:       53                           P:          57  NY:         139                          QRS:        64  QRSD:       83                           T:          64  QT:         467  QTc:        439    Interpretive Statements  Sinus rhythm, 53 bpm  Compared to ECG 2023 22:46:04  No significant changes  Electronically Signed On 2023 06:40:52 PDT by Lynn Cervantes     EKG   Result Value Ref Range    Report       [facility]    Test Date:  2023  Pt Name:    MARTY CHRISTIANSON             Department: ICU  MRN:        [id number]                      Room:       3321  Gender:     Male                         Technician: [id number]  :        1985                   Requested By:TIMOTHY CARTER  Order #:    [id number]                    Reading MD: Kiana Velez MD    Measurements  Intervals                                Axis  Rate:       50                           P:          35  NY:         134                          QRS:        45  QRSD:       87                           T:          41  QT:         463  QTc:        423    Interpretive Statements  Incomplete analysis due to missing data in precordial lead(s)  Sinus bradycardia  Missing lead(s): V6  Compared to ECG 2023 03:23:15  Sinus rhythm no longer present  Electronically Signed On 2023 07:21:57 PDT by Kiana Velez MD     EKG (IP)   Result Value Ref Range     "Report       [facility]    Test Date:  2023  Pt Name:    MARTY CHRISTIANSON             Department: [facility]  MRN:        [id number]                      Room:       2212  Gender:     Male                         Technician: [id number]  :        1985                   Requested By:ATIYA TURK  Order #:    [id number]                    Reading MD: Geovanni Young MD    Measurements  Intervals                                Axis  Rate:       86                           P:          53  OH:         157                          QRS:        59  QRSD:       86                           T:          40  QT:         361  QTc:        432    Interpretive Statements  Sinus rhythm  Compared to ECG 2023 11:22:33  Sinus bradycardia no longer present  Electronically Signed On 2023 23:31:19 PDT by Geovanni Young MD          MSE:   /78   Pulse 83   Temp 36.6 °C (97.9 °F) (Temporal)   Resp 18   Ht 1.803 m (5' 11\")   Wt 97.5 kg (214 lb 15.2 oz)   SpO2 96%     Constitutional: as noted above  General Appearance/Behavior: 38 y.o. appears muscular intermittent eye contact cooperative friendly, No behavioral disturbances  Abnormal Movements: none, no PMA/PMR or tremor observed.  Gait and Posture: not observed  Musculoskeletal: as noted above  Mood: \"okay\"  Affect: Mood/Congruent and Appropriate   Speech: normal rate, normal rhythm, normal tone, normal volume, and normal fluency  Language:  spontaneous, comprehends spoken commands, and fluent   Thought Process: Logical and Goal Directed, Future Oriented  Thought Content: Denies SI/HI, A/VH. No e/o delusions, or internal preoccupation  Insight/Judgement:  fair  Alert/Orientation: alert, oriented to person, place and time  Attn/Concentration: normal  MMSE: deferred this visit     Medications:  Scheduled Medications   Medication Dose Frequency    lithium carbonate ER  600 mg DAILY    senna-docusate  2 Tablet BID    rivaroxaban  10 mg DAILY AT 1800    " thiamine  100 mg DAILY    vitamin B comp+C  1 Tablet DAILY    lithium carbonate ER  600 mg Nightly    nicotine  1 Patch Daily-0600       Allergies:   No Known Allergies     Assessment:   Diagnosis:   1. Intentional overdose, initial encounter (HCC) Acute   2. Psychosis, unspecified psychosis type (HCC) Acute   3. Polysubstance abuse (HCC) Acute   4. Suicidal ideation Acute   5. Non-compliance Acute   6. Methamphetamine abuse (HCC) Acute   7. Bipolar disorder, current episode mixed, severe, without psychotic features (HCC)         Psychiatric:   Schizoaffective disorder, bipolar type, current hypomania  Stimulant use disorder - meth  Cannabis use      Medical: as noted by the medical treatment team.      Recommendations:  Legal Status: on legal hold - court commitment      Please transfer patient to inpatient psychiatric hospital when medically cleared and bed is available.     Observation status:   - Line of site with sitter     Phone: Yes - Okay to use at nursing availability/discretion.   Visitors: No   Personal belongings: Yes, books okay to use at nursing availability/discretion.      Medication Recommendations: Final orders as per Treatment Team  Continue Lithium 600mg BID  Risks/benefits/side effects discussed, patient verbalized understanding.  Medication reconciliation was completed     Reviewed safety plan: 911, ER, PCM, MHC, suicide crisis line, nursing staff while inpatient.     Will Continue to Follow. Thank you for the consult

## 2023-10-07 NOTE — PROGRESS NOTES
[facility] here for transport. Discharged patient alert and oriented x4 in stable condition. Denies chest pain. Denies SOB. Discharge instructions discussed and agreeable. Denies any questions at this time. Ambulated to discharge with [facility] personnel. Refused wheelchair.

## 2024-09-09 NOTE — PROGRESS NOTES
Received patient from Night shift RN . Patient is awake and alert x3. Seen patient walking in his room.On Room air. Patient is on 1:1 sitter. Safety checklist done, Fall precautions in place.     0800- refused due medication.Health teachings rendered.   Patient requested valium and dulcolax       What Type Of Note Output Would You Prefer (Optional)?: Standard Output How Severe Is Your Rash?: mild Is This A New Presentation, Or A Follow-Up?: Rash